# Patient Record
Sex: FEMALE | Race: WHITE | Employment: OTHER | URBAN - METROPOLITAN AREA
[De-identification: names, ages, dates, MRNs, and addresses within clinical notes are randomized per-mention and may not be internally consistent; named-entity substitution may affect disease eponyms.]

---

## 2017-02-13 ENCOUNTER — TELEPHONE (OUTPATIENT)
Dept: INTERNAL MEDICINE CLINIC | Age: 72
End: 2017-02-13

## 2017-02-13 NOTE — TELEPHONE ENCOUNTER
Patient would like to know what dermatologist Fabiola Wilkins would prefer that she see. Please call patient back at [phone]

## 2017-02-14 NOTE — TELEPHONE ENCOUNTER
Contacted patient for further detail  She states that has already made an appointment with [facility] dermatology.  I advised her to call the office back if there is anything further we can help with

## 2017-03-14 ENCOUNTER — OFFICE VISIT (OUTPATIENT)
Dept: INTERNAL MEDICINE CLINIC | Age: 72
End: 2017-03-14

## 2017-03-14 VITALS
HEIGHT: 66 IN | BODY MASS INDEX: 36.8 KG/M2 | TEMPERATURE: 96.9 F | DIASTOLIC BLOOD PRESSURE: 80 MMHG | SYSTOLIC BLOOD PRESSURE: 130 MMHG | HEART RATE: 76 BPM | OXYGEN SATURATION: 98 % | WEIGHT: 229 LBS | RESPIRATION RATE: 18 BRPM

## 2017-03-14 DIAGNOSIS — R09.89 CHEST WALL SYMPTOM OR COMPLAINT: Primary | ICD-10-CM

## 2017-03-14 DIAGNOSIS — I10 BENIGN ESSENTIAL HYPERTENSION: ICD-10-CM

## 2017-03-14 DIAGNOSIS — Z11.59 NEED FOR HEPATITIS C SCREENING TEST: ICD-10-CM

## 2017-03-14 DIAGNOSIS — R73.01 IFG (IMPAIRED FASTING GLUCOSE): ICD-10-CM

## 2017-03-14 DIAGNOSIS — E78.00 HYPERCHOLESTEREMIA: ICD-10-CM

## 2017-03-14 LAB — HBA1C MFR BLD HPLC: 5.6 %

## 2017-03-14 NOTE — PROGRESS NOTES
HPI:  Kiya Guerrier is a 73y.o. year old female who returns to clinic today for routine follow up appointment to discuss the issues below:    Since last visit May 2016 -   She has had a meniscal tear repair with Dr. Raghav Hyde. She completed 8 weeks PT. She continues to have bl knee discomfort. Concerns- notes \"I feel like my heart is going to jump out of my chest\" - occurs only in the evenings when she gets into bed in a recumbent position and mornings upon waking, doesn't wake her from sleep. 2016 was a stressful year for her. Climbing stairs gives her shortness of breath, nothing new. No chest pressure. Admits to being out of shape. Weight stable from last year. Doesn't exercise due to the knee pain. She had a normal stress echo in 2012. fam hx - father had multiple strokes in his 45s, mother no heart disease, her two sisters have no heart disease. H/o IFG - poc A1c 5.6 % today. Htn. Adherent to losartan. Takes at night. Hld. Adherent to atorvastatin. Takes at night. Prior to Admission medications    Medication Sig Start Date End Date Taking? Authorizing Provider   losartan (COZAAR) 100 mg tablet TAKE 1 TABLET BY MOUTH DAILY 7/1/16  Yes [address], [place]   atorvastatin (LIPITOR) 20 mg tablet TAKE 1 TABLET BY MOUTH DAILY 7/1/16  Yes [address], [place]   multivitamin (ONE A DAY) tablet Take 1 Tab by mouth daily. Yes Historical Provider          Allergies   Allergen Reactions    Ace Inhibitors Cough    Thiazides Photosensitivity           Review of Systems   Constitutional: Negative for chills, fever and malaise/fatigue. HENT: Negative for congestion. Respiratory: Negative for cough, shortness of breath and wheezing. Cardiovascular: Negative for leg swelling. Gastrointestinal: Negative for abdominal pain and blood in stool. Musculoskeletal: Negative for falls, joint pain and myalgias. Neurological: Negative for dizziness and headaches.          Physical Exam Constitutional: She appears well-nourished. Obese   Neck: Carotid bruit is not present. Cardiovascular: Normal rate, regular rhythm and normal heart sounds. No murmur heard. Pulses:       Carotid pulses are 2+ on the right side, and 2+ on the left side. Pulmonary/Chest: Effort normal and breath sounds normal.   Abdominal: Soft. Bowel sounds are normal. There is no hepatosplenomegaly. There is no tenderness. Musculoskeletal: She exhibits no edema. Visit Vitals    /80 (BP 1 Location: Left arm, BP Patient Position: Sitting)    Pulse 76    Temp 96.9 °F (36.1 °C) (Oral)    Resp 18    Ht 5' 6\" (1.676 m)    Wt 229 lb (103.9 kg)    SpO2 98%    BMI 36.96 kg/m2     ECG:  NSR with no significant changes compared to 5/2016 study    Assessment & Plan:  Gabriel Chaudhry was seen today for palpitations and other. Diagnoses and all orders for this visit:    Chest wall symptom or complaint  I suspect she has reflux at night. No new exertional symptoms. Discussed trigger foods for heartburn and behavior modification including staying upright for 3 hours after dinner. I will have her take an H2 blocker for 2 weeks (Zantac or Pepcid) twice a day and then go off it for 1-2 weeks. Come back in in a month for reassessment. If symptoms improve but do not resolve, consider further testing. If no improvement at all, consider further cardiac workup such as a Holter monitor for 24 hrs. I will check her labs this week. -     AMB POC EKG ROUTINE W/ 12 LEADS, INTER & REP  -     METABOLIC PANEL, COMPREHENSIVE  -     CBC W/O DIFF  -     TSH REFLEX TO T4    IFG (impaired fasting glucose)   -     AMB POC HEMOGLOBIN A1C    Benign essential hypertension  I evaluated and recommended to continue current doses of medications pending lab work. -     METABOLIC PANEL, COMPREHENSIVE    Hypercholesteremia  I evaluated and recommended to continue current doses of medications pending lab work.    -     LIPID PANEL  - METABOLIC PANEL, COMPREHENSIVE    Need for hepatitis C screening test  -     HEPATITIS C AB         Follow-up Disposition:  Return in about 1 month (around 4/14/2017) for f/u for chest pain (suspect heartburn). Advised her to call back or return to office if symptoms worsen/change/persist.  Discussed expected course/resolution/complications of diagnosis in detail with patient. Medication risks/benefits/costs/interactions/alternatives discussed with patient. She was given an after visit summary which includes diagnoses, current medications, & vitals. She expressed understanding with the diagnosis and plan.

## 2017-03-14 NOTE — PROGRESS NOTES
Chief Complaint   Patient presents with    Palpitations     happens at night and in the morning    Other     clicking in her ear     1. Have you been to the ER, urgent care clinic since your last visit? Hospitalized since your last visit? No    2. Have you seen or consulted any other health care providers outside of the [address] [place] [address] since your last visit? Include any pap smears or colon screening.  No

## 2017-03-14 NOTE — MR AVS SNAPSHOT
Visit Information Date & Time Provider Department Dept. Phone Encounter #  
 3/14/2017 11:20 AM [phone] [id number] Follow-up Instructions Return in about 1 month (around 4/14/2017) for f/u for chest pain (suspect heartburn). Upcoming Health Maintenance Date Due Hepatitis C Screening 1945 DTaP/Tdap/Td series (1 - Tdap) 11/6/1968 ZOSTER VACCINE AGE 60> 11/6/2007 Pneumococcal 65+ Low/Medium Risk (2 of 2 - PCV13) 10/10/2014 INFLUENZA AGE 9 TO ADULT 8/1/2016 GLAUCOMA SCREENING Q2Y 4/30/2017 MEDICARE YEARLY EXAM 6/8/2017 BREAST CANCER SCRN MAMMOGRAM 6/7/2018 COLONOSCOPY 10/10/2023 Allergies as of 3/14/2017  Review Complete On: 3/14/2017 By: [address], [place]  
  
 Severity Noted Reaction Type Reactions Ace Inhibitors  09/20/2011   Side Effect Cough Thiazides  09/21/2011   Topical Photosensitivity Current Immunizations  Reviewed on 10/28/2015 Name Date Influenza High Dose Vaccine PF 10/28/2015 Influenza Vaccine 12/30/2014, 9/1/2013 Pneumococcal Polysaccharide (PPSV-23) 10/10/2013 11:42 AM  
  
 Not reviewed this visit You Were Diagnosed With   
  
 Codes Comments Palpitations    -  Primary ICD-10-CM: R00.2 ICD-9-CM: 785.1 IFG (impaired fasting glucose)     ICD-10-CM: R73.01 
ICD-9-CM: 790.21 Benign essential hypertension     ICD-10-CM: I10 
ICD-9-CM: 401.1 Hypercholesteremia     ICD-10-CM: E78.00 ICD-9-CM: 272.0 Need for hepatitis C screening test     ICD-10-CM: Z11.59 
ICD-9-CM: V73.89 Vitals BP Pulse Temp Resp Height(growth percentile) Weight(growth percentile) 130/80 (BP 1 Location: Left arm, BP Patient Position: Sitting) 76 96.9 °F (36.1 °C) (Oral) 18 5' 6\" (1.676 m) 229 lb (103.9 kg) SpO2 BMI OB Status Smoking Status 98% 36.96 kg/m2 Postmenopausal Never Smoker BMI and BSA Data Body Mass Index Body Surface Area  
 36.96 kg/m 2 2.2 m 2 Preferred Pharmacy [facility] Phone [facility] [address] [place] [address], [address] [place] [address] [phone] Your Updated Medication List  
  
   
This list is accurate as of: 3/14/17 12:21 PM.  Always use your most recent med list.  
  
  
  
  
 atorvastatin 20 mg tablet Commonly known as:  LIPITOR  
TAKE 1 TABLET BY MOUTH DAILY losartan 100 mg tablet Commonly known as:  COZAAR  
TAKE 1 TABLET BY MOUTH DAILY  
  
 multivitamin tablet Commonly known as:  ONE A DAY Take 1 Tab by mouth daily. We Performed the Following AMB POC EKG ROUTINE W/ 12 LEADS, INTER & REP [90706 CPT(R)] AMB POC HEMOGLOBIN A1C [49157 CPT(R)] CBC W/O DIFF [81979 CPT(R)] HEPATITIS C AB [93304 CPT(R)] LIPID PANEL [02375 CPT(R)] METABOLIC PANEL, COMPREHENSIVE [01017 CPT(R)] TSH REFLEX TO T4 [[id number] Custom] Follow-up Instructions Return in about 1 month (around 4/14/2017) for f/u for chest pain (suspect heartburn). Patient Instructions Plan: 
 
Take either Pepcid or Zantac (generic is ok) twice daily for the next 2 weeks. Take your dose 30-60 minutes before breakfast and dinner. Stop for one week before your 1 month follow up visit. Gastroesophageal Reflux Disease (GERD): Care Instructions Your Care Instructions Gastroesophageal reflux disease (GERD) is the backward flow of stomach acid into the esophagus. The esophagus is the tube that leads from your throat to your stomach. A one-way valve prevents the stomach acid from moving up into this tube. When you have GERD, this valve does not close tightly enough. If you have mild GERD symptoms including heartburn, you may be able to control the problem with antacids or over-the-counter medicine.  Changing your diet, losing weight, and making other lifestyle changes can also help reduce symptoms. Follow-up care is a key part of your treatment and safety. Be sure to make and go to all appointments, and call your doctor if you are having problems. Its also a good idea to know your test results and keep a list of the medicines you take. How can you care for yourself at home? · Take your medicines exactly as prescribed. Call your doctor if you think you are having a problem with your medicine. · Your doctor may recommend over-the-counter medicine. For mild or occasional indigestion, antacids, such as Tums, Gaviscon, Mylanta, or Maalox, may help. Your doctor also may recommend over-the-counter acid reducers, such as Pepcid AC, Tagamet HB, Zantac 75, or Prilosec. Read and follow all instructions on the label. If you use these medicines often, talk with your doctor. · Change your eating habits. ¨ Its best to eat several small meals instead of two or three large meals. ¨ After you eat, wait 2 to 3 hours before you lie down. ¨ Chocolate, mint, and alcohol can make GERD worse. ¨ Spicy foods, foods that have a lot of acid (like tomatoes and oranges), and coffee can make GERD symptoms worse in some people. If your symptoms are worse after you eat a certain food, you may want to stop eating that food to see if your symptoms get better. · Do not smoke or chew tobacco. Smoking can make GERD worse. If you need help quitting, talk to your doctor about stop-smoking programs and medicines. These can increase your chances of quitting for good. · If you have GERD symptoms at night, raise the head of your bed 6 to 8 inches by putting the frame on blocks or placing a foam wedge under the head of your mattress. (Adding extra pillows does not work.) · Do not wear tight clothing around your middle. · Lose weight if you need to. Losing just 5 to 10 pounds can help. When should you call for help? Call your doctor now or seek immediate medical care if: 
· You have new or different belly pain. · Your stools are black and tarlike or have streaks of blood. Watch closely for changes in your health, and be sure to contact your doctor if: 
· Your symptoms have not improved after 2 days. · Food seems to catch in your throat or chest. 
Where can you learn more? Go to http://yenifer-juany.info/. Enter R085 in the search box to learn more about \"Gastroesophageal Reflux Disease (GERD): Care Instructions. \" Current as of: August 9, 2016 Content Version: 11.1 © 6686-6402 [employer]. Care instructions adapted under license by [employer] (which disclaims liability or warranty for this information). If you have questions about a medical condition or this instruction, always ask your healthcare professional. [address] any warranty or liability for your use of this information. Introducing [facility]! Shoshana Rodriguez introduces [employer] patient portal. Now you can access parts of your medical record, email your doctor's office, and request medication refills online. 1. In your internet browser, go to [email or web address] 2. Click on the First Time User? Click Here link in the Sign In box. You will see the New Member Sign Up page. 3. Enter your [employer] Access Code exactly as it appears below. You will not need to use this code after youve completed the sign-up process. If you do not sign up before the expiration date, you must request a new code. · [employer] Access Code: [email or web address] Expires: 6/12/2017 12:19 PM 
 
4. Enter the last four digits of your Social Security Number (xxxx) and Date of Birth (mm/dd/yyyy) as indicated and click Submit. You will be taken to the next sign-up page. 5. Create a [employer] ID. This will be your [employer] login ID and cannot be changed, so think of one that is secure and easy to remember. 6. Create a [employer] password. You can change your password at any time. 7. Enter your Password Reset Question and Answer. This can be used at a later time if you forget your password. 8. Enter your e-mail address. You will receive e-mail notification when new information is available in [address] 9. Click Sign Up. You can now view and download portions of your medical record. 10. Click the Download Summary menu link to download a portable copy of your medical information. If you have questions, please visit the Frequently Asked Questions section of the [employer] website. Remember, [employer] is NOT to be used for urgent needs. For medical emergencies, dial 911. Now available from your iPhone and Android! Please provide this summary of care documentation to your next provider. Your primary care clinician is listed as [address]. If you have any questions after today's visit, please call [phone].

## 2017-03-14 NOTE — PATIENT INSTRUCTIONS
Plan:    Take either Pepcid or Zantac (generic is ok) twice daily for the next 2 weeks. Take your dose 30-60 minutes before breakfast and dinner. Stop for one week before your 1 month follow up visit. Gastroesophageal Reflux Disease (GERD): Care Instructions  Your Care Instructions    Gastroesophageal reflux disease (GERD) is the backward flow of stomach acid into the esophagus. The esophagus is the tube that leads from your throat to your stomach. A one-way valve prevents the stomach acid from moving up into this tube. When you have GERD, this valve does not close tightly enough. If you have mild GERD symptoms including heartburn, you may be able to control the problem with antacids or over-the-counter medicine. Changing your diet, losing weight, and making other lifestyle changes can also help reduce symptoms. Follow-up care is a key part of your treatment and safety. Be sure to make and go to all appointments, and call your doctor if you are having problems. Its also a good idea to know your test results and keep a list of the medicines you take. How can you care for yourself at home? · Take your medicines exactly as prescribed. Call your doctor if you think you are having a problem with your medicine. · Your doctor may recommend over-the-counter medicine. For mild or occasional indigestion, antacids, such as Tums, Gaviscon, Mylanta, or Maalox, may help. Your doctor also may recommend over-the-counter acid reducers, such as Pepcid AC, Tagamet HB, Zantac 75, or Prilosec. Read and follow all instructions on the label. If you use these medicines often, talk with your doctor. · Change your eating habits. ¨ Its best to eat several small meals instead of two or three large meals. ¨ After you eat, wait 2 to 3 hours before you lie down. ¨ Chocolate, mint, and alcohol can make GERD worse.   ¨ Spicy foods, foods that have a lot of acid (like tomatoes and oranges), and coffee can make GERD symptoms worse in some people. If your symptoms are worse after you eat a certain food, you may want to stop eating that food to see if your symptoms get better. · Do not smoke or chew tobacco. Smoking can make GERD worse. If you need help quitting, talk to your doctor about stop-smoking programs and medicines. These can increase your chances of quitting for good. · If you have GERD symptoms at night, raise the head of your bed 6 to 8 inches by putting the frame on blocks or placing a foam wedge under the head of your mattress. (Adding extra pillows does not work.)  · Do not wear tight clothing around your middle. · Lose weight if you need to. Losing just 5 to 10 pounds can help. When should you call for help? Call your doctor now or seek immediate medical care if:  · You have new or different belly pain. · Your stools are black and tarlike or have streaks of blood. Watch closely for changes in your health, and be sure to contact your doctor if:  · Your symptoms have not improved after 2 days. · Food seems to catch in your throat or chest.  Where can you learn more? Go to http://yenifer-juany.info/. Enter H893 in the search box to learn more about \"Gastroesophageal Reflux Disease (GERD): Care Instructions. \"  Current as of: August 9, 2016  Content Version: 11.1  © 3639-3744 Healthwise, [employer]. Care instructions adapted under license by [employer] (which disclaims liability or warranty for this information). If you have questions about a medical condition or this instruction, always ask your healthcare professional. [place] [address] any warranty or liability for your use of this information.

## 2017-03-24 ENCOUNTER — HOSPITAL ENCOUNTER (OUTPATIENT)
Dept: LAB | Age: 72
Discharge: HOME | End: 2017-03-24
Payer: MEDICARE

## 2017-03-24 PROCEDURE — 84443 ASSAY THYROID STIM HORMONE: CPT

## 2017-03-24 PROCEDURE — 85027 COMPLETE CBC AUTOMATED: CPT

## 2017-03-24 PROCEDURE — 86803 HEPATITIS C AB TEST: CPT

## 2017-03-24 PROCEDURE — 80053 COMPREHEN METABOLIC PANEL: CPT

## 2017-03-24 PROCEDURE — 36415 COLL VENOUS BLD VENIPUNCTURE: CPT

## 2017-03-24 PROCEDURE — 80061 LIPID PANEL: CPT

## 2017-03-25 LAB
ALBUMIN SERPL-MCNC: 4.2 G/DL (ref 3.6–4.8)
ALBUMIN/GLOB SERPL: 1.6 {RATIO} (ref 1.2–2.2)
ALP SERPL-CCNC: 82 IU/L (ref 39–117)
ALT SERPL-CCNC: 23 IU/L (ref 0–32)
AST SERPL-CCNC: 18 IU/L (ref 0–40)
BILIRUB SERPL-MCNC: 0.5 MG/DL (ref 0–1.2)
BUN SERPL-MCNC: 16 MG/DL (ref 8–27)
BUN/CREAT SERPL: 26 (ref 11–26)
CALCIUM SERPL-MCNC: 9.3 MG/DL (ref 8.7–10.3)
CHLORIDE SERPL-SCNC: 103 MMOL/L (ref 96–106)
CHOLEST SERPL-MCNC: 206 MG/DL (ref 100–199)
CO2 SERPL-SCNC: 25 MMOL/L (ref 18–29)
CREAT SERPL-MCNC: 0.62 MG/DL (ref 0.57–1)
ERYTHROCYTE [DISTWIDTH] IN BLOOD BY AUTOMATED COUNT: 13.7 % (ref 12.3–15.4)
GLOBULIN SER CALC-MCNC: 2.6 G/DL (ref 1.5–4.5)
GLUCOSE SERPL-MCNC: 110 MG/DL (ref 65–99)
HCT VFR BLD AUTO: 42.5 % (ref 34–46.6)
HCV AB S/CO SERPL IA: <0.1 S/CO RATIO (ref 0–0.9)
HDLC SERPL-MCNC: 51 MG/DL
HGB BLD-MCNC: 14.5 G/DL (ref 11.1–15.9)
INTERPRETATION, 910389: NORMAL
LDLC SERPL CALC-MCNC: 129 MG/DL (ref 0–99)
MCH RBC QN AUTO: 30.1 PG (ref 26.6–33)
MCHC RBC AUTO-ENTMCNC: 34.1 G/DL (ref 31.5–35.7)
MCV RBC AUTO: 88 FL (ref 79–97)
PLATELET # BLD AUTO: 254 X10E3/UL (ref 150–379)
POTASSIUM SERPL-SCNC: 4.7 MMOL/L (ref 3.5–5.2)
PROT SERPL-MCNC: 6.8 G/DL (ref 6–8.5)
RBC # BLD AUTO: 4.82 X10E6/UL (ref 3.77–5.28)
SODIUM SERPL-SCNC: 144 MMOL/L (ref 134–144)
TRIGL SERPL-MCNC: 130 MG/DL (ref 0–149)
TSH SERPL DL<=0.005 MIU/L-ACNC: 3.23 UIU/ML (ref 0.45–4.5)
VLDLC SERPL CALC-MCNC: 26 MG/DL (ref 5–40)
WBC # BLD AUTO: 6.4 X10E3/UL (ref 3.4–10.8)

## 2017-03-30 RX ORDER — ATORVASTATIN CALCIUM 40 MG/1
40 TABLET, FILM COATED ORAL DAILY
Qty: 90 TAB | Refills: 3 | Status: SHIPPED | OUTPATIENT
Start: 2017-03-30 | End: 2018-04-19 | Stop reason: SDUPTHER

## 2017-03-31 NOTE — PROGRESS NOTES
The following message was sent to patient by letter along with lab results: All of your labs look good aside from a continued elevation of your LDL (or \"bad\" type) cholesterol level. I recommend an increase in your atorvastatin dose to 40 mg daily. I have updated your prescription at your pharmacy.

## 2017-04-18 ENCOUNTER — OFFICE VISIT (OUTPATIENT)
Dept: INTERNAL MEDICINE CLINIC | Age: 72
End: 2017-04-18

## 2017-04-18 VITALS
BODY MASS INDEX: 37.61 KG/M2 | DIASTOLIC BLOOD PRESSURE: 78 MMHG | WEIGHT: 234 LBS | RESPIRATION RATE: 18 BRPM | TEMPERATURE: 97.7 F | OXYGEN SATURATION: 98 % | SYSTOLIC BLOOD PRESSURE: 142 MMHG | HEART RATE: 78 BPM | HEIGHT: 66 IN

## 2017-04-18 DIAGNOSIS — R00.2 PALPITATIONS: Primary | ICD-10-CM

## 2017-04-18 DIAGNOSIS — S80.12XA TRAUMATIC HEMATOMA OF LOWER LEG, LEFT, INITIAL ENCOUNTER: ICD-10-CM

## 2017-04-18 DIAGNOSIS — F41.1 GAD (GENERALIZED ANXIETY DISORDER): ICD-10-CM

## 2017-04-18 DIAGNOSIS — I10 BENIGN ESSENTIAL HYPERTENSION: ICD-10-CM

## 2017-04-18 DIAGNOSIS — E78.00 HYPERCHOLESTEREMIA: ICD-10-CM

## 2017-04-18 NOTE — PROGRESS NOTES
74 yo female in for f/u of palpitations/heart fluttering. She took the Antacid as instructed by Dr. Barbie Salamanca, and during that time had several brief episodes of what seems to be her GERD, but these episodes are unpredictable anyway. She has a lot of family stressors right now, one of which is her daughter's up-coming wedding. Her sleep is not good, but this is not new. Also she fell 6 days ago, and continues to have swelling in her foot and leg. She stubbed her toe and fell forward onto brick steps striking her distal LLE. Since then, there has been a knot at that area, and the bruising is now down into the ankle, foot and toes. There has been some discomfort on bottom of the foot at base of 2nd toe also. She did not apply ice. She initially took some Advil, but not after the first day. She has not really been able to be off it or prop it up for long periods. She likes salt, but is cognizant of being careful with it. She is not good about water drinking. She asks about OTC appetite suppressants.     PE: Overweight WF very anxious-appearing and doesn't make a lot of eye contact during our encounter   Repeat BP - 140/78   Heart - 60/M and regular   LLE - laterally and distally w/ lemon-sized hematoma; bruising is visible in medical ankle and top of foot to the toes    Imp: LISANDRA/situational stress   HLD - recent increase in statin   HTN - systolic higher than usual (more take out food recently)   Traumatic Hematoma LLE   Overweight    Plan: Order for fasting lipid panel to be done in early June   She is asked to monitor BP at a pharmacy or grocery    Advised to check her heart rate when feeling the fluttering   May need 24 hr Holter monitor   Advised against OTC appetite suppresants; suggested she eat enough protein w/ each meal, and choose low connie snacks and drink more water   Schedule for 4 mo f/u with the new NP   _______________________  Expected course of current diagnosed problem(s) as well as expected progression and possible complications, and desired follow up with provider are discussed with patient. Patient is encouraged to be back in touch with any questions or concerns. Patient expresses understanding of plan of care. Patient is given AVS which includes diagnoses, current medications, vitals.

## 2017-04-18 NOTE — PROGRESS NOTES
Chief Complaint   Patient presents with    Palpitations     f/u saw Dr. Codey Villagran 1 month ago, patient stated maybe better she is less stressed out now.     Leg Pain     fell last Wednesday, swollen     Foot Pain     fell last Wednesday, swollen

## 2017-04-18 NOTE — PATIENT INSTRUCTIONS
Careful with salt/sodium    Drink more water    Elevate legs when sitting    Warm compress to bruised area on Left Leg for 20 minutes per application to help speed the resolution of hematoma/bruising    Report repeated episodes of heart fluttering for possible 24 hour monitor

## 2017-06-05 DIAGNOSIS — E78.00 HYPERCHOLESTEREMIA: ICD-10-CM

## 2017-06-29 ENCOUNTER — HOSPITAL ENCOUNTER (OUTPATIENT)
Dept: LAB | Age: 72
Discharge: HOME | End: 2017-06-29
Payer: MEDICARE

## 2017-06-29 PROCEDURE — 36415 COLL VENOUS BLD VENIPUNCTURE: CPT

## 2017-06-29 PROCEDURE — 80061 LIPID PANEL: CPT

## 2017-06-30 LAB
CHOLEST SERPL-MCNC: 181 MG/DL (ref 100–199)
HDLC SERPL-MCNC: 50 MG/DL
INTERPRETATION, 910389: NORMAL
LDLC SERPL CALC-MCNC: 96 MG/DL (ref 0–99)
TRIGL SERPL-MCNC: 177 MG/DL (ref 0–149)
VLDLC SERPL CALC-MCNC: 35 MG/DL (ref 5–40)

## 2017-09-28 ENCOUNTER — OFFICE VISIT (OUTPATIENT)
Dept: INTERNAL MEDICINE CLINIC | Age: 72
End: 2017-09-28

## 2017-09-28 VITALS
HEART RATE: 70 BPM | DIASTOLIC BLOOD PRESSURE: 82 MMHG | RESPIRATION RATE: 14 BRPM | SYSTOLIC BLOOD PRESSURE: 166 MMHG | HEIGHT: 66 IN | BODY MASS INDEX: 36.96 KG/M2 | OXYGEN SATURATION: 96 % | TEMPERATURE: 98.9 F | WEIGHT: 230 LBS

## 2017-09-28 DIAGNOSIS — I10 BENIGN ESSENTIAL HYPERTENSION: ICD-10-CM

## 2017-09-28 DIAGNOSIS — R73.01 IFG (IMPAIRED FASTING GLUCOSE): ICD-10-CM

## 2017-09-28 DIAGNOSIS — F32.A MILD DEPRESSION: ICD-10-CM

## 2017-09-28 DIAGNOSIS — Z23 ENCOUNTER FOR IMMUNIZATION: Primary | ICD-10-CM

## 2017-09-28 DIAGNOSIS — Z92.89 H/O BONE DENSITY STUDY: ICD-10-CM

## 2017-09-28 DIAGNOSIS — Z00.00 MEDICARE ANNUAL WELLNESS VISIT, SUBSEQUENT: ICD-10-CM

## 2017-09-28 DIAGNOSIS — M85.9 DISORDER OF BONE DENSITY AND STRUCTURE, UNSPECIFIED: ICD-10-CM

## 2017-09-28 DIAGNOSIS — E78.00 HYPERCHOLESTEREMIA: ICD-10-CM

## 2017-09-28 DIAGNOSIS — Z12.31 ENCOUNTER FOR SCREENING MAMMOGRAM FOR MALIGNANT NEOPLASM OF BREAST: ICD-10-CM

## 2017-09-28 RX ORDER — AMLODIPINE BESYLATE 5 MG/1
5 TABLET ORAL DAILY
Qty: 30 TAB | Refills: 3 | Status: SHIPPED | OUTPATIENT
Start: 2017-09-28 | End: 2018-01-15 | Stop reason: SDUPTHER

## 2017-09-28 NOTE — PATIENT INSTRUCTIONS
1.  Start taking amlodipine 5mg, 1 tab daily for your high blood pressure    2. Schedule your mammogram, colonoscopy, DEXA scan, and Echo Stress test    3. Have labs done at lab whit- you will need to be fasting    4. Return in 6 weeks for BP recheck      Vaccine Information Statement    Influenza (Flu) Vaccine (Inactivated or Recombinant): What you need to know    Many Vaccine Information Statements are available in [place]ian and other languages. See www.immunize.org/vis  Hojas de Información Sobre Vacunas están disponibles en Español y en muchos otros idiomas. Visite www.immunize.org/vis    1. Why get vaccinated? Influenza (flu) is a contagious disease that spreads around the [place] every year, usually between October and May. Flu is caused by influenza viruses, and is spread mainly by coughing, sneezing, and close contact. Anyone can get flu. Flu strikes suddenly and can last several days. Symptoms vary by age, but can include:   fever/chills   sore throat   muscle aches   fatigue   cough   headache    runny or stuffy nose    Flu can also lead to pneumonia and blood infections, and cause diarrhea and seizures in children. If you have a medical condition, such as heart or lung disease, flu can make it worse. Flu is more dangerous for some people. Infants and young children, people 72years of age and older, pregnant women, and people with certain health conditions or a weakened immune system are at greatest risk. Each year thousands of people in the [facility] die from flu, and many more are hospitalized. Flu vaccine can:   keep you from getting flu,   make flu less severe if you do get it, and   keep you from spreading flu to your family and other people. 2. Inactivated and recombinant flu vaccines    A dose of flu vaccine is recommended every flu season. Children 6 months through 6years of age may need two doses during the same flu season.   Everyone else needs only one dose each flu season. Some inactivated flu vaccines contain a very small amount of a mercury-based preservative called thimerosal. Studies have not shown thimerosal in vaccines to be harmful, but flu vaccines that do not contain thimerosal are available. There is no live flu virus in flu shots. They cannot cause the flu. There are many flu viruses, and they are always changing. Each year a new flu vaccine is made to protect against three or four viruses that are likely to cause disease in the upcoming flu season. But even when the vaccine doesnt exactly match these viruses, it may still provide some protection    Flu vaccine cannot prevent:   flu that is caused by a virus not covered by the vaccine, or   illnesses that look like flu but are not. It takes about 2 weeks for protection to develop after vaccination, and protection lasts through the flu season. 3. Some people should not get this vaccine    Tell the person who is giving you the vaccine:     If you have any severe, life-threatening allergies. If you ever had a life-threatening allergic reaction after a dose of flu vaccine, or have a severe allergy to any part of this vaccine, you may be advised not to get vaccinated. Most, but not all, types of flu vaccine contain a small amount of egg protein.  If you ever had Guillain-Barré Syndrome (also called GBS). Some people with a history of GBS should not get this vaccine. This should be discussed with your doctor.  If you are not feeling well. It is usually okay to get flu vaccine when you have a mild illness, but you might be asked to come back when you feel better. 4. Risks of a vaccine reaction    With any medicine, including vaccines, there is a chance of reactions. These are usually mild and go away on their own, but serious reactions are also possible. Most people who get a flu shot do not have any problems with it.      Minor problems following a flu shot include:    soreness, redness, or swelling where the shot was given     hoarseness   sore, red or itchy eyes   cough   fever   aches   headache   itching   fatigue  If these problems occur, they usually begin soon after the shot and last 1 or 2 days. More serious problems following a flu shot can include the following:     There may be a small increased risk of Guillain-Barré Syndrome (GBS) after inactivated flu vaccine. This risk has been estimated at 1 or 2 additional cases per million people vaccinated. This is much lower than the risk of severe complications from flu, which can be prevented by flu vaccine.  Young children who get the flu shot along with pneumococcal vaccine (PCV13) and/or DTaP vaccine at the same time might be slightly more likely to have a seizure caused by fever. Ask your doctor for more information. Tell your doctor if a child who is getting flu vaccine has ever had a seizure. Problems that could happen after any injected vaccine:      People sometimes faint after a medical procedure, including vaccination. Sitting or lying down for about 15 minutes can help prevent fainting, and injuries caused by a fall. Tell your doctor if you feel dizzy, or have vision changes or ringing in the ears.  Some people get severe pain in the shoulder and have difficulty moving the arm where a shot was given. This happens very rarely.  Any medication can cause a severe allergic reaction. Such reactions from a vaccine are very rare, estimated at about 1 in a million doses, and would happen within a few minutes to a few hours after the vaccination. As with any medicine, there is a very remote chance of a vaccine causing a serious injury or death. The safety of vaccines is always being monitored. For more information, visit: www.cdc.gov/vaccinesafety/    5. What if there is a serious reaction? What should I look for?      Look for anything that concerns you, such as signs of a severe allergic reaction, very high fever, or unusual behavior. Signs of a severe allergic reaction can include hives, swelling of the face and throat, difficulty breathing, a fast heartbeat, dizziness, and weakness  usually within a few minutes to a few hours after the vaccination. What should I do?  If you think it is a severe allergic reaction or other emergency that cant wait, call 9-1-1 and get the person to the nearest hospital. Otherwise, call your doctor.  Reactions should be reported to the Vaccine Adverse Event Reporting System (VAERS). Your doctor should file this report, or you can do it yourself through  the VAERS web site at www.vaers. [facility].gov, or by calling 2-618.617.7201. VAERS does not give medical advice. 6. The National Vaccine Injury Compensation Program    The [facility] Vaccine Injury Compensation Program (VICP) is a federal program that was created to compensate people who may have been injured by certain vaccines. Persons who believe they may have been injured by a vaccine can learn about the program and about filing a claim by calling 5-644.646.5189 or visiting the [employer] website at www.[facility].gov/vaccinecompensation. There is a time limit to file a claim for compensation. 7. How can I learn more?  Ask your healthcare provider. He or she can give you the vaccine package insert or suggest other sources of information.  Call your local or state health department.  Contact the Centers for Disease Control and Prevention (CDC):  - Call 6-765.755.5360 (1-800-CDC-INFO) or  - Visit CDCs website at www.cdc.gov/flu    Vaccine Information Statement   Inactivated Influenza Vaccine   8/7/2015  42 . [place] [facility] 284GM-52    Department of Health and Human Services  Centers for Disease Control and Prevention    Office Use Only

## 2017-09-28 NOTE — MR AVS SNAPSHOT
Visit Information Date & Time Provider Department Dept. Phone Encounter #  
 9/28/2017  1:00 PM JOSIAN[facility] [phone] [id number] Follow-up Instructions Return in about 6 weeks (around 11/9/2017) for CPE, [facility] Wellness. Upcoming Health Maintenance Date Due DTaP/Tdap/Td series (1 - Tdap) 11/6/1968 ZOSTER VACCINE AGE 60> 9/6/2007 Pneumococcal 65+ Low/Medium Risk (2 of 2 - PCV13) 10/10/2014 GLAUCOMA SCREENING Q2Y 4/30/2017 INFLUENZA AGE 9 TO ADULT 8/1/2017 BREAST CANCER SCRN MAMMOGRAM 6/7/2018 MEDICARE YEARLY EXAM 9/29/2018 COLONOSCOPY 10/10/2023 Allergies as of 9/28/2017  Review Complete On: 9/28/2017 By: Milan Obando LPN Severity Noted Reaction Type Reactions Ace Inhibitors  09/20/2011   Side Effect Cough Thiazides  09/21/2011   Topical Photosensitivity Current Immunizations  Reviewed on 10/28/2015 Name Date Influenza High Dose Vaccine PF  Incomplete, 10/28/2015 Influenza Vaccine 12/30/2014, 9/1/2013 Pneumococcal Polysaccharide (PPSV-23) 10/10/2013 11:42 AM  
  
 Not reviewed this visit You Were Diagnosed With   
  
 Codes Comments Encounter for immunization    -  Primary ICD-10-CM: S06 ICD-9-CM: V03.89 Medicare annual wellness visit, subsequent     ICD-10-CM: Z00.00 ICD-9-CM: V70.0 Encounter for screening mammogram for malignant neoplasm of breast     ICD-10-CM: Z12.31 
ICD-9-CM: V76.12 Disorder of bone density and structure, unspecified     ICD-10-CM: M85.9 ICD-9-CM: 733.90 Benign essential hypertension     ICD-10-CM: I10 
ICD-9-CM: 401.1 Hypercholesteremia     ICD-10-CM: E78.00 ICD-9-CM: 272.0 Prediabetes     ICD-10-CM: R73.03 
ICD-9-CM: 790.29   
 H/O bone density study     ICD-10-CM: Z92.89 ICD-9-CM: V15.89 IFG (impaired fasting glucose)     ICD-10-CM: R73.01 
ICD-9-CM: 790.21 Vitals BP Pulse Temp Resp Height(growth percentile) Weight(growth percentile) 164/82 (BP 1 Location: Left arm, BP Patient Position: Sitting) 70 98.9 °F (37.2 °C) (Oral) 14 5' 6\" (1.676 m) 230 lb (104.3 kg) SpO2 BMI OB Status Smoking Status 96% 37.12 kg/m2 Postmenopausal Never Smoker BMI and BSA Data Body Mass Index Body Surface Area  
 37.12 kg/m 2 2.2 m 2 Preferred Pharmacy Pharmacy Name Phone [facility] [address] [place] [address], [address] [place] [address] [phone] Your Updated Medication List  
  
   
This list is accurate as of: 17  2:09 PM.  Always use your most recent med list. amLODIPine 5 mg tablet Commonly known as:  [place] Cordial Take 1 Tab by mouth daily. Indications: hypertension  
  
 atorvastatin 40 mg tablet Commonly known as:  LIPITOR Take 1 Tab by mouth daily. losartan 100 mg tablet Commonly known as:  COZAAR  
TAKE 1 TABLET BY MOUTH DAILY  
  
 multivitamin tablet Commonly known as:  ONE A DAY Take 1 Tab by mouth daily. pneumococcal 23-valent 25 mcg/0.5 mL injection Commonly known as:  PNEUMOVAX 23  
0.5 mL by IntraMUSCular route once for 1 dose. Indications: PREVENTION OF STREPTOCOCCUS PNEUMONIAE INFECTION Prescriptions Sent to Pharmacy Refills  
 pneumococcal 23-valent (PNEUMOVAX 23) 25 mcg/0.5 mL injection 0 Si.5 mL by IntraMUSCular route once for 1 dose. Indications: PREVENTION OF STREPTOCOCCUS PNEUMONIAE INFECTION Class: Normal  
 Pharmacy: [facility] [address] [place] [address],  [facility] of [address] [place] [address] #: [phone] Route: IntraMUSCular  
 amLODIPine (NORVASC) 5 mg tablet 3 Sig: Take 1 Tab by mouth daily. Indications: hypertension  Class: Normal  
 Pharmacy: [facility] [address] [place] [address],  [facility] [address] AT [facility] of [address] Ph #: [phone] Route: Oral  
  
We Performed the Following CBC WITH AUTOMATED DIFF [64924 CPT(R)] HEMOGLOBIN A1C WITH EAG [58037 CPT(R)] INFLUENZA VIRUS VACCINE, HIGH DOSE SEASONAL, PRESERVATIVE FREE [13302 CPT(R)] LIPID PANEL [38912 CPT(R)] METABOLIC PANEL, COMPREHENSIVE [29460 CPT(R)] CO IMMUNIZ ADMIN,1 SINGLE/COMB VAC/TOXOID [id number] CPT(R)] REFERRAL FOR COLONOSCOPY [NDS196 Custom] TSH RFX ON ABNORMAL TO FREE T4 [[id number] Custom] Follow-up Instructions Return in about 6 weeks (around 11/9/2017) for CPE, [facility] Wellness. To-Do List   
 10/05/2017 ECHO:  ECHO TTE STRESS EXRCSE COMP W OR WO CONTR   
  
 10/10/2017 Imaging:  [facility] MAMMO BI SCREENING INCL CAD   
  
 10/31/2017 Imaging:  DEXA BONE DENSITY STUDY AXIAL Referral Information Referral ID Referred By Referred To  
  
 [id number] [address] Visits Status Start Date End Date 1 Authorized 9/28/17 9/28/18 If your referral has a status of pending review or denied, additional information will be sent to support the outcome of this decision. Patient Instructions 1. Start taking amlodipine 5mg, 1 tab daily for your high blood pressure 2. Schedule your mammogram, colonoscopy, DEXA scan, and Echo Stress test 
 
3. Have labs done at lab whit- you will need to be fasting 4. Return in 6 weeks for BP recheck Vaccine Information Statement Influenza (Flu) Vaccine (Inactivated or Recombinant): What you need to know Many Vaccine Information Statements are available in [place] and other languages. See www.immunize.org/vis Hojas de Información Sobre Vacunas están disponibles en Español y en muchos otros idiomas. Visite www.immunize.org/vis 1. Why get vaccinated? Influenza (flu) is a contagious disease that spreads around the [facility] every year, usually between October and May. Flu is caused by influenza viruses, and is spread mainly by coughing, sneezing, and close contact. Anyone can get flu. Flu strikes suddenly and can last several days. Symptoms vary by age, but can include: 
 fever/chills  sore throat  muscle aches  fatigue  cough  headache  runny or stuffy nose Flu can also lead to pneumonia and blood infections, and cause diarrhea and seizures in children. If you have a medical condition, such as heart or lung disease, flu can make it worse. Flu is more dangerous for some people. Infants and young children, people 72years of age and older, pregnant women, and people with certain health conditions or a weakened immune system are at greatest risk. Each year thousands of people in the [facility] die from flu, and many more are hospitalized. Flu vaccine can: 
 keep you from getting flu, 
 make flu less severe if you do get it, and 
 keep you from spreading flu to your family and other people. 2. Inactivated and recombinant flu vaccines A dose of flu vaccine is recommended every flu season. Children 6 months through 6years of age may need two doses during the same flu season. Everyone else needs only one dose each flu season. Some inactivated flu vaccines contain a very small amount of a mercury-based preservative called thimerosal. Studies have not shown thimerosal in vaccines to be harmful, but flu vaccines that do not contain thimerosal are available. There is no live flu virus in flu shots. They cannot cause the flu. There are many flu viruses, and they are always changing. Each year a new flu vaccine is made to protect against three or four viruses that are likely to cause disease in the upcoming flu season.  But even when the vaccine doesnt exactly match these viruses, it may still provide some protection Flu vaccine cannot prevent: 
 flu that is caused by a virus not covered by the vaccine, or 
 illnesses that look like flu but are not. It takes about 2 weeks for protection to develop after vaccination, and protection lasts through the flu season. 3. Some people should not get this vaccine Tell the person who is giving you the vaccine:  If you have any severe, life-threatening allergies. If you ever had a life-threatening allergic reaction after a dose of flu vaccine, or have a severe allergy to any part of this vaccine, you may be advised not to get vaccinated. Most, but not all, types of flu vaccine contain a small amount of egg protein.  If you ever had Guillain-Barré Syndrome (also called GBS). Some people with a history of GBS should not get this vaccine. This should be discussed with your doctor.  If you are not feeling well. It is usually okay to get flu vaccine when you have a mild illness, but you might be asked to come back when you feel better. 4. Risks of a vaccine reaction With any medicine, including vaccines, there is a chance of reactions. These are usually mild and go away on their own, but serious reactions are also possible. Most people who get a flu shot do not have any problems with it. Minor problems following a flu shot include:  
 soreness, redness, or swelling where the shot was given  hoarseness  sore, red or itchy eyes  cough  fever  aches  headache  itching  fatigue If these problems occur, they usually begin soon after the shot and last 1 or 2 days. More serious problems following a flu shot can include the following:  There may be a small increased risk of Guillain-Barré Syndrome (GBS) after inactivated flu vaccine.   This risk has been estimated at 1 or 2 additional cases per million people vaccinated. This is much lower than the risk of severe complications from flu, which can be prevented by flu vaccine.  Young children who get the flu shot along with pneumococcal vaccine (PCV13) and/or DTaP vaccine at the same time might be slightly more likely to have a seizure caused by fever. Ask your doctor for more information. Tell your doctor if a child who is getting flu vaccine has ever had a seizure. Problems that could happen after any injected vaccine:  People sometimes faint after a medical procedure, including vaccination. Sitting or lying down for about 15 minutes can help prevent fainting, and injuries caused by a fall. Tell your doctor if you feel dizzy, or have vision changes or ringing in the ears.  Some people get severe pain in the shoulder and have difficulty moving the arm where a shot was given. This happens very rarely.  Any medication can cause a severe allergic reaction. Such reactions from a vaccine are very rare, estimated at about 1 in a million doses, and would happen within a few minutes to a few hours after the vaccination. As with any medicine, there is a very remote chance of a vaccine causing a serious injury or death. The safety of vaccines is always being monitored. For more information, visit: www.cdc.gov/vaccinesafety/ 
 
5. What if there is a serious reaction? What should I look for?  Look for anything that concerns you, such as signs of a severe allergic reaction, very high fever, or unusual behavior. Signs of a severe allergic reaction can include hives, swelling of the face and throat, difficulty breathing, a fast heartbeat, dizziness, and weakness  usually within a few minutes to a few hours after the vaccination. What should I do?  
 
 If you think it is a severe allergic reaction or other emergency that cant wait, call 9-1-1 and get the person to the nearest hospital. Otherwise, call your doctor.  Reactions should be reported to the Vaccine Adverse Event Reporting System (VAERS). Your doctor should file this report, or you can do it yourself through  the VAERS web site at www.vaers. hhs.gov, or by calling 5-465.911.6524. VAERS does not give medical advice. 6. The National Vaccine Injury Compensation Program 
 
The [facility] Vaccine Injury Compensation Program (VICP) is a federal program that was created to compensate people who may have been injured by certain vaccines. Persons who believe they may have been injured by a vaccine can learn about the program and about filing a claim by calling 3-263.389.3301 or visiting the [employer] website at www.[facility].gov/vaccinecompensation. There is a time limit to file a claim for compensation. 7. How can I learn more?  Ask your healthcare provider. He or she can give you the vaccine package insert or suggest other sources of information.  Call your local or state health department.  Contact the Centers for Disease Control and Prevention (CDC): 
- Call 1-100.409.1317 (8-657-MWV-INFO) or 
- Visit CDCs website at www.cdc.gov/flu Vaccine Information Statement Inactivated Influenza Vaccine 8/7/2015 
[address] [id number]-[id number] Department of Health and [employer] Centers for Disease Control and Prevention Office Use Only [facility] HEALTH SERVICES! [facility] introduces Watchsend patient portal. Now you can access parts of your medical record, email your doctor's office, and request medication refills online. 1. In your internet browser, go to [email or web address]. [employer]/[address] [employer] 2. Click on the First Time User? Click Here link in the Sign In box. You will see the New Member Sign Up page. 3. Enter your Watchsend Access Code exactly as it appears below. You will not need to use this code after youve completed the sign-up process.  If you do not sign up before the expiration date, you must request a new code. 
 
· Manta Access Code: [email or web address] Expires: 12/27/2017  1:45 PM 
 
4. Enter the last four digits of your Social Security Number (xxxx) and Date of Birth (mm/dd/yyyy) as indicated and click Submit. You will be taken to the next sign-up page. 5. Create a Manta ID. This will be your Manta login ID and cannot be changed, so think of one that is secure and easy to remember. 6. Create a Manta password. You can change your password at any time. 7. Enter your Password Reset Question and Answer. This can be used at a later time if you forget your password. 8. Enter your e-mail address. You will receive e-mail notification when new information is available in [address] 9. Click Sign Up. You can now view and download portions of your medical record. 10. Click the Download Summary menu link to download a portable copy of your medical information. If you have questions, please visit the Frequently Asked Questions section of the Manta website. Remember, Manta is NOT to be used for urgent needs. For medical emergencies, dial 911. Now available from your iPhone and Android! Please provide this summary of care documentation to your next provider. Your primary care clinician is listed as [address]. If you have any questions after today's visit, please call [phone].

## 2017-09-28 NOTE — PROGRESS NOTES
Chief Complaint   Patient presents with   [facility] Establish Care     Previous [facility] patient    Annual Wellness Visit     1. Have you been to the ER, urgent care clinic since your last visit? Hospitalized since your last visit? No    2. Have you seen or consulted any other health care providers outside of the [address] [place] [address] since your last visit? Include any pap smears or colon screening.  No
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
## 2017-09-29 ENCOUNTER — HOSPITAL ENCOUNTER (OUTPATIENT)
Dept: LAB | Age: 72
Discharge: HOME | End: 2017-09-29
Payer: MEDICARE

## 2017-09-29 PROCEDURE — 36415 COLL VENOUS BLD VENIPUNCTURE: CPT

## 2017-09-29 PROCEDURE — 80053 COMPREHEN METABOLIC PANEL: CPT

## 2017-09-29 PROCEDURE — 80061 LIPID PANEL: CPT

## 2017-09-29 PROCEDURE — 83036 HEMOGLOBIN GLYCOSYLATED A1C: CPT

## 2017-09-29 PROCEDURE — 84443 ASSAY THYROID STIM HORMONE: CPT

## 2017-09-29 PROCEDURE — 85025 COMPLETE CBC W/AUTO DIFF WBC: CPT

## 2017-09-30 LAB
ALBUMIN SERPL-MCNC: 4.4 G/DL (ref 3.6–4.8)
ALBUMIN/GLOB SERPL: 1.5 {RATIO} (ref 1.2–2.2)
ALP SERPL-CCNC: 82 IU/L (ref 39–117)
ALT SERPL-CCNC: 22 IU/L (ref 0–32)
AST SERPL-CCNC: 19 IU/L (ref 0–40)
BASOPHILS # BLD AUTO: 0 X10E3/UL (ref 0–0.2)
BASOPHILS NFR BLD AUTO: 0 %
BILIRUB SERPL-MCNC: 0.6 MG/DL (ref 0–1.2)
BUN SERPL-MCNC: 12 MG/DL (ref 8–27)
BUN/CREAT SERPL: 17 (ref 12–28)
CALCIUM SERPL-MCNC: 9.9 MG/DL (ref 8.7–10.3)
CHLORIDE SERPL-SCNC: 102 MMOL/L (ref 96–106)
CHOLEST SERPL-MCNC: 173 MG/DL (ref 100–199)
CO2 SERPL-SCNC: 26 MMOL/L (ref 18–29)
CREAT SERPL-MCNC: 0.71 MG/DL (ref 0.57–1)
EOSINOPHIL # BLD AUTO: 0.3 X10E3/UL (ref 0–0.4)
EOSINOPHIL NFR BLD AUTO: 4 %
ERYTHROCYTE [DISTWIDTH] IN BLOOD BY AUTOMATED COUNT: 13.5 % (ref 12.3–15.4)
EST. AVERAGE GLUCOSE BLD GHB EST-MCNC: 120 MG/DL
GLOBULIN SER CALC-MCNC: 2.9 G/DL (ref 1.5–4.5)
GLUCOSE SERPL-MCNC: 103 MG/DL (ref 65–99)
HBA1C MFR BLD: 5.8 % (ref 4.8–5.6)
HCT VFR BLD AUTO: 41.5 % (ref 34–46.6)
HDLC SERPL-MCNC: 47 MG/DL
HGB BLD-MCNC: 13.8 G/DL (ref 11.1–15.9)
IMM GRANULOCYTES # BLD: 0 X10E3/UL (ref 0–0.1)
IMM GRANULOCYTES NFR BLD: 0 %
INTERPRETATION, 910389: NORMAL
LDLC SERPL CALC-MCNC: 89 MG/DL (ref 0–99)
LYMPHOCYTES # BLD AUTO: 3 X10E3/UL (ref 0.7–3.1)
LYMPHOCYTES NFR BLD AUTO: 40 %
MCH RBC QN AUTO: 29.8 PG (ref 26.6–33)
MCHC RBC AUTO-ENTMCNC: 33.3 G/DL (ref 31.5–35.7)
MCV RBC AUTO: 90 FL (ref 79–97)
MONOCYTES # BLD AUTO: 0.4 X10E3/UL (ref 0.1–0.9)
MONOCYTES NFR BLD AUTO: 6 %
NEUTROPHILS # BLD AUTO: 3.6 X10E3/UL (ref 1.4–7)
NEUTROPHILS NFR BLD AUTO: 50 %
PLATELET # BLD AUTO: 292 X10E3/UL (ref 150–379)
POTASSIUM SERPL-SCNC: 4.5 MMOL/L (ref 3.5–5.2)
PROT SERPL-MCNC: 7.3 G/DL (ref 6–8.5)
RBC # BLD AUTO: 4.63 X10E6/UL (ref 3.77–5.28)
SODIUM SERPL-SCNC: 142 MMOL/L (ref 134–144)
TRIGL SERPL-MCNC: 183 MG/DL (ref 0–149)
TSH SERPL DL<=0.005 MIU/L-ACNC: 2.82 UIU/ML (ref 0.45–4.5)
VLDLC SERPL CALC-MCNC: 37 MG/DL (ref 5–40)
WBC # BLD AUTO: 7.4 X10E3/UL (ref 3.4–10.8)

## 2017-10-02 NOTE — PROGRESS NOTES
Spoke with patient regarding results, elevated triglycerides, etc    Encouraged starting to exercise- start with walking or whatever she enjoys    Discussed limiting red meats, cheese, fried foods, alcohol    Will see pt back in 6 weeks for BP recheck    Loren Ruiz NP

## 2017-10-16 ENCOUNTER — TELEPHONE (OUTPATIENT)
Dept: INTERNAL MEDICINE CLINIC | Age: 72
End: 2017-10-16

## 2017-10-16 DIAGNOSIS — Z78.0 POST-MENOPAUSE: Primary | ICD-10-CM

## 2017-10-16 NOTE — TELEPHONE ENCOUNTER
[facility] stating that the diagnosis code for patient's bone density would not pass with medicare. They would need a new order with a new diagnosis.

## 2017-10-18 ENCOUNTER — HOSPITAL ENCOUNTER (OUTPATIENT)
Dept: MAMMOGRAPHY | Age: 72
Discharge: HOME | End: 2017-10-18
Attending: NURSE PRACTITIONER
Payer: MEDICARE

## 2017-10-18 ENCOUNTER — HOSPITAL ENCOUNTER (OUTPATIENT)
Dept: NON INVASIVE DIAGNOSTICS | Age: 72
Discharge: HOME | End: 2017-10-18
Attending: NURSE PRACTITIONER
Payer: MEDICARE

## 2017-10-18 DIAGNOSIS — Z78.0 POST-MENOPAUSE: ICD-10-CM

## 2017-10-18 DIAGNOSIS — Z00.00 MEDICARE ANNUAL WELLNESS VISIT, SUBSEQUENT: ICD-10-CM

## 2017-10-18 DIAGNOSIS — Z12.31 ENCOUNTER FOR SCREENING MAMMOGRAM FOR MALIGNANT NEOPLASM OF BREAST: ICD-10-CM

## 2017-10-18 DIAGNOSIS — R06.09 DYSPNEA ON EXERTION: ICD-10-CM

## 2017-10-18 DIAGNOSIS — Z23 ENCOUNTER FOR IMMUNIZATION: ICD-10-CM

## 2017-10-18 DIAGNOSIS — R00.2 HEART PALPITATIONS: ICD-10-CM

## 2017-10-18 LAB
ATTENDING PHYSICIAN, CST07: NORMAL
DIAGNOSIS, 93000: NORMAL
DUKE TM SCORE RESULT, CST14: NORMAL
DUKE TREADMILL SCORE, CST13: NORMAL
ECG INTERP BEFORE EX, CST11: NORMAL
ECG INTERP DURING EX, CST12: NORMAL
FUNCTIONAL CAPACITY, CST17: NORMAL
KNOWN CARDIAC CONDITION, CST08: NORMAL
MAX. DIASTOLIC BP, CST04: 70 MMHG
MAX. HEART RATE, CST05: 139 BPM
MAX. SYSTOLIC BP, CST03: 170 MMHG
OVERALL BP RESPONSE TO EXERCISE, CST16: NORMAL
OVERALL HR RESPONSE TO EXERCISE, CST15: NORMAL
PEAK EX METS, CST10: 4.6 METS
PROTOCOL NAME, CST01: NORMAL
TEST INDICATION, CST09: NORMAL

## 2017-10-18 PROCEDURE — 77080 DXA BONE DENSITY AXIAL: CPT

## 2017-10-18 PROCEDURE — 93351 STRESS TTE COMPLETE: CPT

## 2017-10-18 PROCEDURE — 77067 SCR MAMMO BI INCL CAD: CPT

## 2017-10-19 NOTE — TELEPHONE ENCOUNTER
Requested Prescriptions     Pending Prescriptions Disp Refills    losartan (COZAAR) 100 mg tablet 90 Tab 0     Last OV:09/28/17  Next OV:11/09/17           Pharmacy: [place] [id number], [id number] [address] AT [place] [address]

## 2017-10-20 RX ORDER — LOSARTAN POTASSIUM 100 MG/1
100 TABLET ORAL DAILY
Qty: 90 TAB | Refills: 1 | Status: SHIPPED | OUTPATIENT
Start: 2017-10-20 | End: 2018-04-19 | Stop reason: SDUPTHER

## 2017-10-20 NOTE — TELEPHONE ENCOUNTER
Last office visit - 09/28/17  Next office visit - 11/09/17  Last Refill - 07/17/17    Requested Prescriptions     Pending Prescriptions Disp Refills    losartan (COZAAR) 100 mg tablet 90 Tab 0

## 2017-12-05 ENCOUNTER — OFFICE VISIT (OUTPATIENT)
Dept: INTERNAL MEDICINE CLINIC | Age: 72
End: 2017-12-05

## 2017-12-05 VITALS
HEART RATE: 68 BPM | HEIGHT: 66 IN | TEMPERATURE: 99.1 F | DIASTOLIC BLOOD PRESSURE: 70 MMHG | RESPIRATION RATE: 14 BRPM | OXYGEN SATURATION: 97 % | SYSTOLIC BLOOD PRESSURE: 132 MMHG | BODY MASS INDEX: 36.64 KG/M2 | WEIGHT: 228 LBS

## 2017-12-05 DIAGNOSIS — Z23 ENCOUNTER FOR IMMUNIZATION: Primary | ICD-10-CM

## 2017-12-05 DIAGNOSIS — I10 ESSENTIAL HYPERTENSION: ICD-10-CM

## 2017-12-05 DIAGNOSIS — R00.2 INTERMITTENT PALPITATIONS: ICD-10-CM

## 2017-12-05 DIAGNOSIS — F32.A MOOD DISORDER OF DEPRESSED TYPE: ICD-10-CM

## 2017-12-05 NOTE — PROGRESS NOTES
Patient's identity verified with two patient identifiers (name and date of birth). 1. Have you been to the ER, urgent care clinic since your last visit? Hospitalized since your last visit? No  2. Have you seen or consulted any other health care providers outside of the [address] [place] [address] since your last visit? Include any pap smears or colon screening. No    Chief Complaint   Patient presents with    Hypertension     6 wk follow up     Not fasting. Health Maintenance Due   Topic Date Due    DTaP/Tdap/Td series (1 - Tdap) 11/06/1968    ZOSTER VACCINE AGE 60>   Done per patient, unsure of date, Bharat.  09/06/2007    GLAUCOMA SCREENING Q2Y  04/30/2017

## 2017-12-05 NOTE — MR AVS SNAPSHOT
Visit Information Date & Time Provider Department Dept. Phone Encounter #  
 12/5/2017 10:00 AM Irl Castleman, NP [address] [facility] [phone] Follow-up Instructions Return in about 4 months (around 4/5/2018) for htn. Upcoming Health Maintenance Date Due DTaP/Tdap/Td series (1 - Tdap) 11/6/1966 ZOSTER VACCINE AGE 60> 9/6/2007 GLAUCOMA SCREENING Q2Y 4/30/2017 MEDICARE YEARLY EXAM 9/29/2018 BREAST CANCER SCRN MAMMOGRAM 10/18/2019 COLONOSCOPY 10/10/2023 Allergies as of 12/5/2017  Review Complete On: 12/5/2017 By: Leonor Fishman Severity Noted Reaction Type Reactions Ace Inhibitors  09/20/2011   Side Effect Cough Thiazides  09/21/2011   Topical Photosensitivity Current Immunizations  Reviewed on 10/28/2015 Name Date Influenza High Dose Vaccine PF 9/28/2017, 10/28/2015 Influenza Vaccine 12/30/2014, 9/1/2013 Pneumococcal Polysaccharide (PPSV-23) 10/10/2013 11:42 AM  
  
 Not reviewed this visit Vitals BP Pulse Temp Resp Height(growth percentile) Weight(growth percentile) 132/70 68 99.1 °F (37.3 °C) (Oral) 14 5' 6\" (1.676 m) 228 lb (103.4 kg) SpO2 BMI OB Status Smoking Status 97% 36.8 kg/m2 Postmenopausal Never Smoker Vitals History BMI and BSA Data Body Mass Index Body Surface Area  
 36.8 kg/m 2 2.19 m 2 Preferred Pharmacy Pharmacy Name Phone [facility] [address] [place] [address], [address] [place] [address] [phone] Your Updated Medication List  
  
   
This list is accurate as of: 12/5/17 10:29 AM.  Always use your most recent med list. amLODIPine 5 mg tablet Commonly known as:  Elyn Coffer Take 1 Tab by mouth daily. Indications: hypertension  
  
 atorvastatin 40 mg tablet Commonly known as:  LIPITOR Take 1 Tab by mouth daily. losartan 100 mg tablet Commonly known as:  COZAAR Take 1 Tab by mouth daily. multivitamin tablet Commonly known as:  ONE A DAY Take 1 Tab by mouth daily. Follow-up Instructions Return in about 4 months (around 4/5/2018) for htn. Patient Instructions 1.  certirizine or loratadine daily 2. Nasacort nasal spray daily Introducing hospitals & HEALTH SERVICES! Antonio Guerrero introduces [employer] patient portal. Now you can access parts of your medical record, email your doctor's office, and request medication refills online. 1. In your internet browser, go to [email or web address] Butt 2. Click on the First Time User? Click Here link in the Sign In box. You will see the New Member Sign Up page. 3. Enter your [employer] Access Code exactly as it appears below. You will not need to use this code after youve completed the sign-up process. If you do not sign up before the expiration date, you must request a new code. · [employer] Access Code: [email or web address] Expires: 12/27/2017 12:45 PM 
 
4. Enter the last four digits of your Social Security Number (xxxx) and Date of Birth (mm/dd/yyyy) as indicated and click Submit. You will be taken to the next sign-up page. 5. Create a [employer] ID. This will be your [employer] login ID and cannot be changed, so think of one that is secure and easy to remember. 6. Create a [employer] password. You can change your password at any time. 7. Enter your Password Reset Question and Answer. This can be used at a later time if you forget your password. 8. Enter your e-mail address. You will receive e-mail notification when new information is available in [address] 9. Click Sign Up. You can now view and download portions of your medical record. 10. Click the Download Summary menu link to download a portable copy of your medical information.  
 
If you have questions, please visit the Frequently Asked Questions section of the [employer]. Remember, [employer] is NOT to be used for urgent needs. For medical emergencies, dial 911. Now available from your iPhone and Android! Please provide this summary of care documentation to your next provider. Your primary care clinician is listed as Toi Ortega. If you have any questions after today's visit, please call [phone].

## 2017-12-05 NOTE — PROGRESS NOTES
Subjective:      Mariana Hansen is a 81 y.o. female who presents today for HTN     HTN  Added amlodipine at the end of September  Also taking losartan  Has not been checking BP at home  No chest pain, dyspnea, HAs    Palpitations:  Continues to have palpitaitons in the morning, \"this is when I do my best thinking\"  Do not occur at other times  Stress echo normal  Declines holter monitor for evaluation    OA:  Legs and knees bother her all the time \"nothing new\"  Depends on her activity  Taking tylenol, tylenol pm helps her sleep  Has not seen an Orthopedist for about 18 months, saw Dr. Uday Farris    Depressed Mood:  Marriage difficulties  Sudden lss of husbands sister in law  Is a busy time  Not sure if she is depressed  Feels that she has a lot of demands on her, although children are grown  Middle daughter comes over almost daily, \"I could really do without that, shes difficult at times\"    Obesity:  Would like to lose weight  \"I just cant seem to focus on it\", \"I dont put myself first\"  Not exercising    Patient Active Problem List    Diagnosis Date Noted    H/O bone density study 12/10/2013    Pap smear for cervical cancer screening 11/20/2013    Prediabetes 10/11/2013    Personal history of colonic polyps 10/01/2013    Hypercholesteremia 10/01/2013    Obesity (BMI 35.0-39.9 without comorbidity) 10/01/2013    Benign essential hypertension 09/09/2011     Current Outpatient Prescriptions   Medication Sig Dispense Refill    diph,Pertuss,Acell,,Tet Vac-PF (ADACEL) 2 Lf-(2.5-5-3-5 mcg)-5Lf/0.5 mL susp 0.5 mL by IntraMUSCular route once for 1 dose. Indications: DIPHTHERIA-PERTUSSIS-TETANUS COMBINED PREVENTION 1 Syringe 0    losartan (COZAAR) 100 mg tablet Take 1 Tab by mouth daily. 90 Tab 1    amLODIPine (NORVASC) 5 mg tablet Take 1 Tab by mouth daily. Indications: hypertension 30 Tab 3    atorvastatin (LIPITOR) 40 mg tablet Take 1 Tab by mouth daily.  90 Tab 3    multivitamin (ONE A DAY) tablet Take 1 Tab by mouth daily. Review of Systems    Pertinent items are noted in HPI. Objective:     Visit Vitals    /70    Pulse 68    Temp 99.1 °F (37.3 °C) (Oral)    Resp 14    Ht 5' 6\" (1.676 m)    Wt 228 lb (103.4 kg)    SpO2 97%    BMI 36.8 kg/m2     General appearance: alert, cooperative, no distress, appears stated age  Head: Normocephalic, without obvious abnormality, atraumatic  Neck: supple, symmetrical, trachea midline, no carotid bruit and no JVD  Lungs: clear to auscultation bilaterally  Heart: regular rate and rhythm, S1, S2 normal, no murmur, click, rub or gallop  Extremities: extremities normal, atraumatic, no cyanosis or edema  Skin: Skin color, texture, turgor normal. No rashes or lesions  Neurologic: Grossly normal  Psych: calm, cooperative, nonsuidical    Assessment/Plan:     1. Essential hypertension  - at goal  - cont current meds    2. Intermittent palpitations  - declines Holter monitor for further evaluation today  - possible anxiety related    3. BMI 36.0-36.9,adult  - interested in losing weight, back lacks motivation  - encouraged exercise, diet changes, weight loss    4. Mood disorder of depressed type  - declines medications today  - nonsuicidal    5. Encounter for immunization  - diph,Pertuss,Acell,,Tet Vac-PF (ADACEL) 2 Lf-(2.5-5-3-5 mcg)-5Lf/0.5 mL susp; 0.5 mL by IntraMUSCular route once for 1 dose. Indications: DIPHTHERIA-PERTUSSIS-TETANUS COMBINED PREVENTION  Dispense: 1 Syringe; Refill: 0    Advised her to call back or return to office if symptoms worsen/change/persist.  Discussed expected course/resolution/complications of diagnosis in detail with patient. Medication risks/benefits/costs/interactions/alternatives discussed with patient. She was given an after visit summary which includes diagnoses, current medications, & vitals. She expressed understanding with the diagnosis and plan.     ZOHRA Barajas

## 2018-01-15 DIAGNOSIS — I10 BENIGN ESSENTIAL HYPERTENSION: ICD-10-CM

## 2018-01-15 RX ORDER — AMLODIPINE BESYLATE 5 MG/1
5 TABLET ORAL DAILY
Qty: 90 TAB | Refills: 1 | Status: SHIPPED | OUTPATIENT
Start: 2018-01-15 | End: 2018-07-18 | Stop reason: SDUPTHER

## 2018-01-15 NOTE — TELEPHONE ENCOUNTER
Last office visit- 12/05/17  Next office visit- 4/10/18  Last Refill- 9/28/17    Requested Prescriptions     Pending Prescriptions Disp Refills    amLODIPine (NORVASC) 5 mg tablet 30 Tab 3     Sig: Take 1 Tab by mouth daily.  Indications: hypertension

## 2018-04-10 ENCOUNTER — OFFICE VISIT (OUTPATIENT)
Dept: INTERNAL MEDICINE CLINIC | Age: 73
End: 2018-04-10

## 2018-04-10 VITALS
DIASTOLIC BLOOD PRESSURE: 60 MMHG | HEART RATE: 71 BPM | RESPIRATION RATE: 16 BRPM | TEMPERATURE: 99.1 F | BODY MASS INDEX: 38.09 KG/M2 | WEIGHT: 237 LBS | OXYGEN SATURATION: 96 % | HEIGHT: 66 IN | SYSTOLIC BLOOD PRESSURE: 130 MMHG

## 2018-04-10 DIAGNOSIS — R73.03 PREDIABETES: ICD-10-CM

## 2018-04-10 DIAGNOSIS — F43.9 STRESS AT HOME: ICD-10-CM

## 2018-04-10 DIAGNOSIS — Z23 ENCOUNTER FOR IMMUNIZATION: Primary | ICD-10-CM

## 2018-04-10 DIAGNOSIS — E78.00 HYPERCHOLESTEREMIA: ICD-10-CM

## 2018-04-10 DIAGNOSIS — I10 BENIGN ESSENTIAL HYPERTENSION: ICD-10-CM

## 2018-04-10 NOTE — PROGRESS NOTES
Subjective:      Elissa Galicia is a 81 y.o. female who presents today for follow up of HTN    HTN:  Taking losartan and amlodipine  Does not check BP at home  No regular exercise  Denies chest pain, palpitaitons, dyspnea, LE edema, HAs, or dizziness    Last eye exam: 3 weeks ago. 3/26/18. Goes to Dr. Susana Barreto. Has a family history of macular degeneration. Now taking eye vitamins    Hyperlipidemia: taking atorvastatin. Denies myalgias    Obesity/Prediabetes: no medications. No regular exercise. Gained weight over the holidays     Depression: has marriage difficulties ( with recent surgery for retinal detachment), recent sudden death of sister in law, cat of 21 years just passed away 1 week ago. Has had some family drama with her sisters (has 2), is not currently speaking with either. Is not sleeping. Knows that she would sleep better if she began to exercise. Does not want medication or counseling referral    Palpitations: intermittent, only in the morning, continues. Has had Stress Echo which was normal 10/2017. Declines holter monitor for evaluation    Patient Active Problem List    Diagnosis Date Noted    H/O bone density study 12/10/2013    Pap smear for cervical cancer screening 11/20/2013    Prediabetes 10/11/2013    Personal history of colonic polyps 10/01/2013    Hypercholesteremia 10/01/2013    Obesity (BMI 35.0-39.9 without comorbidity) 10/01/2013    Benign essential hypertension 09/09/2011     Current Outpatient Prescriptions   Medication Sig Dispense Refill    amLODIPine (NORVASC) 5 mg tablet Take 1 Tab by mouth daily. Indications: hypertension 90 Tab 1    losartan (COZAAR) 100 mg tablet Take 1 Tab by mouth daily. 90 Tab 1    atorvastatin (LIPITOR) 40 mg tablet Take 1 Tab by mouth daily. 90 Tab 3    multivitamin (ONE A DAY) tablet Take 1 Tab by mouth daily. Review of Systems    Pertinent items are noted in HPI.      Objective:     Visit Vitals    /60 (BP 1 Location: Left arm, BP Patient Position: Sitting)    Pulse 71    Temp 99.1 °F (37.3 °C) (Oral)    Resp 16    Ht 5' 6\" (1.676 m)    Wt 237 lb (107.5 kg)    SpO2 96%    BMI 38.25 kg/m2     General appearance: alert, cooperative, no distress, appears stated age  Head: Normocephalic, without obvious abnormality, atraumatic  Lungs: clear to auscultation bilaterally  Heart: regular rate and rhythm, S1, S2 normal, no murmur, click, rub or gallop  Extremities: extremities normal, atraumatic, trace BLE edema  Pulses: 2+ and symmetric pedal pulses  Skin: warm and dry  Neurologic: Grossly normal  Psych: calm, cooperative, nonsuicidal    Assessment/Plan:     1. Benign essential hypertension  - at goal  - cont current meds  - needs to start regular exercise regimen  - METABOLIC PANEL, COMPREHENSIVE  - CBC WITH AUTOMATED DIFF    2. Hypercholesteremia  - cont meds  - needs to start regular exercise regimen  - LIPID PANEL  - METABOLIC PANEL, COMPREHENSIVE    3. Prediabetes  - no meds  - needs to start regular exercise regimen  - HEMOGLOBIN A1C WITH EAG  - METABOLIC PANEL, COMPREHENSIVE    4. Stress at home  - declines medication or psych/counseling referral today  - counseling resource sheet given to patient  - nonsuicidal    5. Encounter for immunization  - reviewed immunization recommendations with patient. Last TDap > 10 years ago. Is around a new baby  - varicella-zoster recombinant, PF, (SHINGRIX) 50 mcg/0.5 mL susr injection; 0.5 mL by IntraMUSCular route once for 1 dose. Indications: PREVENTION OF HERPES ZOSTER  Dispense: 0.5 mL; Refill: 1  - diph,Pertuss,Acell,,Tet Vac-PF (ADACEL) 2 Lf-(2.5-5-3-5 mcg)-5Lf/0.5 mL susp; 0.5 mL by IntraMUSCular route once for 1 dose. Indications: DIPHTHERIA-PERTUSSIS-TETANUS COMBINED PREVENTION  Dispense: 1 Syringe;  Refill: 0      Advised her to call back or return to office if symptoms worsen/change/persist.  Discussed expected course/resolution/complications of diagnosis in detail with patient. Medication risks/benefits/costs/interactions/alternatives discussed with patient. She was given an after visit summary which includes diagnoses, current medications, & vitals. She expressed understanding with the diagnosis and plan.     ZOHRA Lara

## 2018-04-10 NOTE — PROGRESS NOTES
1. Have you been to the ER, urgent care clinic since your last visit? Hospitalized since your last visit? No    2. Have you seen or consulted any other health care providers outside of the [address] [place] [address] since your last visit? Include any pap smears or colon screening.  No  Chief Complaint   Patient presents with    Hypertension    Arm Pain     dull ache right lukas morning from [place] to shoulder

## 2018-04-10 NOTE — PATIENT INSTRUCTIONS
Let me know if you change your mind about a counseling referral or medication    [facility] [phone]   1326 [facility] 6 [phone])  Phone [phone]  Phone [phone] End Office  Phone [phone] 7239 Kulwant Mercer   [email or web address]  Locations: [place] [phone], [place] [phone], [facility] End [phone], [facility] 595 [facility]  [email or web address]  [address]  [phone]    [facility]  [email or web address]  [address]: [phone] (x1)  Dr. Jessica Trimble: [phone] (x2)    LORELEI WILKINS PROVIDERS:  [facility]  Eli Cortes MD (psychiatrist)  [address] [facility],5Th Floor [facility] 79.    Robert Henderson (counselor)    [facility]  [phone]

## 2018-04-10 NOTE — MR AVS SNAPSHOT
727 [facility], [address] [place], [place] [phone] Patient: Andre Harris MRN: [id number] :1945 Visit Information Date & Time Provider Department Dept. Phone Encounter #  
 4/10/2018  2:00 PM JOSIANE Tineo  [facility] [phone] [id number] Follow-up Instructions Return in about 4 months (around 8/10/2018) for HTN, XOL, predm. Upcoming Health Maintenance Date Due DTaP/Tdap/Td series (1 - Tdap) 1966 ZOSTER VACCINE AGE 60> 2007 MEDICARE YEARLY EXAM 2018 BREAST CANCER SCRN MAMMOGRAM 10/18/2019 GLAUCOMA SCREENING Q2Y 3/26/2020 COLONOSCOPY 10/10/2023 Allergies as of 4/10/2018  Review Complete On: 4/10/2018 By: Jessenia Sylvester NP Severity Noted Reaction Type Reactions Ace Inhibitors  2011   Side Effect Cough Thiazides  2011   Topical Photosensitivity Current Immunizations  Reviewed on 10/28/2015 Name Date Influenza High Dose Vaccine PF 2017, 10/28/2015 Influenza Vaccine 2014, 2013 Pneumococcal Polysaccharide (PPSV-23) 10/10/2013 11:42 AM  
  
 Not reviewed this visit You Were Diagnosed With   
  
 Codes Comments Encounter for immunization    -  Primary ICD-10-CM: H30 ICD-9-CM: V03.89 Benign essential hypertension     ICD-10-CM: I10 
ICD-9-CM: 401.1 Hypercholesteremia     ICD-10-CM: E78.00 ICD-9-CM: 272.0 Prediabetes     ICD-10-CM: R73.03 
ICD-9-CM: 790.29 Vitals BP Pulse Temp Resp Height(growth percentile) Weight(growth percentile) 130/60 (BP 1 Location: Left arm, BP Patient Position: Sitting) 71 99.1 °F (37.3 °C) (Oral) 16 5' 6\" (1.676 m) 237 lb (107.5 kg) SpO2 BMI OB Status Smoking Status 96% 38.25 kg/m2 Postmenopausal Never Smoker Vitals History BMI and BSA Data  Body Mass Index Body Surface Area  
 38.25 kg/m 2 2.24 m 2  
  
  
 Preferred Pharmacy Pharmacy Name Phone [facility] [address], [address] [phone] Your Updated Medication List  
  
   
This list is accurate as of 4/10/18  2:52 PM.  Always use your most recent med list. amLODIPine 5 mg tablet Commonly known as:  Jacquetta Couch Take 1 Tab by mouth daily. Indications: hypertension  
  
 atorvastatin 40 mg tablet Commonly known as:  LIPITOR Take 1 Tab by mouth daily. diph,Pertuss(Acell),Tet Vac-PF 2 Lf-(2.5-5-3-5 mcg)-5Lf/0.5 mL susp Commonly known as:  ADACEL  
0.5 mL by IntraMUSCular route once for 1 dose. Indications: DIPHTHERIA-PERTUSSIS-TETANUS COMBINED PREVENTION  
  
 losartan 100 mg tablet Commonly known as:  COZAAR Take 1 Tab by mouth daily. multivitamin tablet Commonly known as:  ONE A DAY Take 1 Tab by mouth daily. PRESERVISION AREDS 2 PO Take  by mouth.  
  
 varicella-zoster recombinant (PF) 50 mcg/0.5 mL Susr injection Commonly known as:  SHINGRIX  
0.5 mL by IntraMUSCular route once for 1 dose. Indications: PREVENTION OF HERPES ZOSTER Prescriptions Printed Refills  
 varicella-zoster recombinant, PF, (SHINGRIX) 50 mcg/0.5 mL susr injection 1 Si.5 mL by IntraMUSCular route once for 1 dose. Indications: PREVENTION OF HERPES ZOSTER Class: Print Route: IntraMUSCular  
 diph,Pertuss,Acell,,Tet Vac-PF (ADACEL) 2 Lf-(2.5-5-3-5 mcg)-5Lf/0.5 mL susp 0 Si.5 mL by IntraMUSCular route once for 1 dose. Indications: DIPHTHERIA-PERTUSSIS-TETANUS COMBINED PREVENTION Class: Print Route: IntraMUSCular We Performed the Following CBC WITH AUTOMATED DIFF [47086 CPT(R)] HEMOGLOBIN A1C WITH EAG [63515 CPT(R)] LIPID PANEL [62003 CPT(R)] METABOLIC PANEL, COMPREHENSIVE [92863 CPT(R)] Follow-up Instructions Return in about 4 months (around 8/10/2018) for HTN, XOL, predm. Patient Instructions Let me know if you change your mind about a counseling referral or medication [facility] Phone [phone] 1328 [facility] 6 (Main) Phone [phone] 208 [facility] Phone [phone] 8064 [facility] Phone [phone] 0643 Kul[address] Mercer  
[email or web address] 
Locations: [address] [place] [address] [phone], [address] [place] [address],4Th Floor, [address] [place] [address] [phone] [facility] 
[email or web address] 
1224 [facility], [address] 
[phone] [facility] 
[email or web address] 
[address] [facility] [address] Travon Ford: [phone] (x1) Dr. Darryle Bower: [phone] (x2) [facility] PROVIDERS: 
[facility] Serena Singh MD (psychiatrist) [address] [place] [address] [address] 
[phone] Shamir Muhammad LCSW (counselor)   [facility] 
[phone] Introducing \[facility] [place] [facility]\"" & HEALTH SERVICES! [facility] introduces "[employer] patient portal. Now you can access parts of your medical record, email your doctor's office, and request medication refills online. 1. In your internet browser, go to [email or web address] 2. Click on the First Time User? Click Here link in the Sign In box. You will see the New Member Sign Up page. 3. Enter your "[employer] Access Code exactly as it appears below. You will not need to use this code after youve completed the sign-up process. If you do not sign up before the expiration date, you must request a new code. · "[employer] Access Code: [email or web address] Expires: 7/9/2018  2:52 PM 
 
4.  Enter the last four digits of your Social Security Number (xxxx) and Date of Birth (mm/dd/yyyy) as indicated and click Submit. You will be taken to the next sign-up page. 5. Create a [employer] ID. This will be your [employer] login ID and cannot be changed, so think of one that is secure and easy to remember. 6. Create a [employer] password. You can change your password at any time. 7. Enter your Password Reset Question and Answer. This can be used at a later time if you forget your password. 8. Enter your e-mail address. You will receive e-mail notification when new information is available in [address] 9. Click Sign Up. You can now view and download portions of your medical record. 10. Click the Download Summary menu link to download a portable copy of your medical information. If you have questions, please visit the Frequently Asked Questions section of the [employer] website. Remember, [employer] is NOT to be used for urgent needs. For medical emergencies, dial 911. Now available from your iPhone and Android! Please provide this summary of care documentation to your next provider. Your primary care clinician is listed as Rozina Norton. If you have any questions after today's visit, please call [phone].

## 2018-04-13 ENCOUNTER — HOSPITAL ENCOUNTER (OUTPATIENT)
Dept: LAB | Age: 73
Discharge: HOME | End: 2018-04-13
Payer: MEDICARE

## 2018-04-13 PROCEDURE — 80061 LIPID PANEL: CPT

## 2018-04-13 PROCEDURE — 36415 COLL VENOUS BLD VENIPUNCTURE: CPT

## 2018-04-13 PROCEDURE — 85025 COMPLETE CBC W/AUTO DIFF WBC: CPT

## 2018-04-13 PROCEDURE — 83036 HEMOGLOBIN GLYCOSYLATED A1C: CPT

## 2018-04-13 PROCEDURE — 80053 COMPREHEN METABOLIC PANEL: CPT

## 2018-04-14 LAB
ALBUMIN SERPL-MCNC: 4.6 G/DL (ref 3.5–4.8)
ALBUMIN/GLOB SERPL: 1.5 {RATIO} (ref 1.2–2.2)
ALP SERPL-CCNC: 80 IU/L (ref 39–117)
ALT SERPL-CCNC: 24 IU/L (ref 0–32)
AST SERPL-CCNC: 19 IU/L (ref 0–40)
BASOPHILS # BLD AUTO: 0 X10E3/UL (ref 0–0.2)
BASOPHILS NFR BLD AUTO: 0 %
BILIRUB SERPL-MCNC: 0.9 MG/DL (ref 0–1.2)
BUN SERPL-MCNC: 22 MG/DL (ref 8–27)
BUN/CREAT SERPL: 30 (ref 12–28)
CALCIUM SERPL-MCNC: 9.8 MG/DL (ref 8.7–10.3)
CHLORIDE SERPL-SCNC: 102 MMOL/L (ref 96–106)
CHOLEST SERPL-MCNC: 201 MG/DL (ref 100–199)
CO2 SERPL-SCNC: 23 MMOL/L (ref 18–29)
CREAT SERPL-MCNC: 0.74 MG/DL (ref 0.57–1)
EOSINOPHIL # BLD AUTO: 0.3 X10E3/UL (ref 0–0.4)
EOSINOPHIL NFR BLD AUTO: 4 %
ERYTHROCYTE [DISTWIDTH] IN BLOOD BY AUTOMATED COUNT: 13.8 % (ref 12.3–15.4)
EST. AVERAGE GLUCOSE BLD GHB EST-MCNC: 120 MG/DL
GFR SERPLBLD CREATININE-BSD FMLA CKD-EPI: 82 ML/MIN/1.73
GFR SERPLBLD CREATININE-BSD FMLA CKD-EPI: 95 ML/MIN/1.73
GLOBULIN SER CALC-MCNC: 3 G/DL (ref 1.5–4.5)
GLUCOSE SERPL-MCNC: 107 MG/DL (ref 65–99)
HBA1C MFR BLD: 5.8 % (ref 4.8–5.6)
HCT VFR BLD AUTO: 41.6 % (ref 34–46.6)
HDLC SERPL-MCNC: 52 MG/DL
HGB BLD-MCNC: 13.7 G/DL (ref 11.1–15.9)
IMM GRANULOCYTES # BLD: 0 X10E3/UL (ref 0–0.1)
IMM GRANULOCYTES NFR BLD: 0 %
INTERPRETATION, 910389: NORMAL
LDLC SERPL CALC-MCNC: 114 MG/DL (ref 0–99)
LYMPHOCYTES # BLD AUTO: 3.1 X10E3/UL (ref 0.7–3.1)
LYMPHOCYTES NFR BLD AUTO: 45 %
MCH RBC QN AUTO: 29.7 PG (ref 26.6–33)
MCHC RBC AUTO-ENTMCNC: 32.9 G/DL (ref 31.5–35.7)
MCV RBC AUTO: 90 FL (ref 79–97)
MONOCYTES # BLD AUTO: 0.3 X10E3/UL (ref 0.1–0.9)
MONOCYTES NFR BLD AUTO: 5 %
NEUTROPHILS # BLD AUTO: 3.2 X10E3/UL (ref 1.4–7)
NEUTROPHILS NFR BLD AUTO: 46 %
PLATELET # BLD AUTO: 249 X10E3/UL (ref 150–379)
POTASSIUM SERPL-SCNC: 4.4 MMOL/L (ref 3.5–5.2)
PROT SERPL-MCNC: 7.6 G/DL (ref 6–8.5)
RBC # BLD AUTO: 4.62 X10E6/UL (ref 3.77–5.28)
SODIUM SERPL-SCNC: 143 MMOL/L (ref 134–144)
TRIGL SERPL-MCNC: 177 MG/DL (ref 0–149)
VLDLC SERPL CALC-MCNC: 35 MG/DL (ref 5–40)
WBC # BLD AUTO: 7 X10E3/UL (ref 3.4–10.8)

## 2018-04-19 RX ORDER — ATORVASTATIN CALCIUM 40 MG/1
40 TABLET, FILM COATED ORAL DAILY
Qty: 90 TAB | Refills: 1 | Status: SHIPPED | OUTPATIENT
Start: 2018-04-19 | End: 2018-10-15 | Stop reason: SDUPTHER

## 2018-04-19 RX ORDER — LOSARTAN POTASSIUM 100 MG/1
100 TABLET ORAL DAILY
Qty: 90 TAB | Refills: 1 | Status: SHIPPED | OUTPATIENT
Start: 2018-04-19 | End: 2018-10-15 | Stop reason: SDUPTHER

## 2018-07-18 DIAGNOSIS — I10 BENIGN ESSENTIAL HYPERTENSION: ICD-10-CM

## 2018-07-19 RX ORDER — AMLODIPINE BESYLATE 5 MG/1
5 TABLET ORAL DAILY
Qty: 90 TAB | Refills: 0 | Status: SHIPPED | OUTPATIENT
Start: 2018-07-19 | End: 2018-10-15 | Stop reason: SDUPTHER

## 2018-10-15 DIAGNOSIS — E78.00 HYPERCHOLESTEREMIA: Primary | ICD-10-CM

## 2018-10-15 DIAGNOSIS — I10 BENIGN ESSENTIAL HYPERTENSION: ICD-10-CM

## 2018-10-15 RX ORDER — LOSARTAN POTASSIUM 100 MG/1
100 TABLET ORAL DAILY
Qty: 90 TAB | Refills: 0 | Status: SHIPPED | OUTPATIENT
Start: 2018-10-15 | End: 2019-01-15 | Stop reason: SDUPTHER

## 2018-10-15 RX ORDER — ATORVASTATIN CALCIUM 40 MG/1
40 TABLET, FILM COATED ORAL DAILY
Qty: 90 TAB | Refills: 0 | Status: SHIPPED | OUTPATIENT
Start: 2018-10-15 | End: 2019-01-15 | Stop reason: SDUPTHER

## 2018-10-15 RX ORDER — AMLODIPINE BESYLATE 5 MG/1
5 TABLET ORAL DAILY
Qty: 90 TAB | Refills: 0 | Status: SHIPPED | OUTPATIENT
Start: 2018-10-15 | End: 2019-01-15 | Stop reason: SDUPTHER

## 2018-10-15 NOTE — TELEPHONE ENCOUNTER
PCP: Марина Plascencia NP    Last appt: 4/10/2018  Future Appointments  Date Time Provider Jam Velázquez   10/19/2018 12:15 PM [facility]. [facility]     Last refill for atorvastatin and losartan was 4/ 19/2018  Last refill for  Amlodipine was 7/ 19/2018    Requested Prescriptions     Pending Prescriptions Disp Refills    amLODIPine (NORVASC) 5 mg tablet 90 Tab 0     Sig: Take 1 Tab by mouth daily. Needs office visit for further refills  Indications: hypertension    losartan (COZAAR) 100 mg tablet 90 Tab 1     Sig: Take 1 Tab by mouth daily.  atorvastatin (LIPITOR) 40 mg tablet 90 Tab 1     Sig: Take 1 Tab by mouth daily.

## 2018-10-15 NOTE — TELEPHONE ENCOUNTER
PCP: Марина Plascencia NP    Last appt: 4/10/2018  Future Appointments  Date Time Provider Jam Melania   10/19/2018 12:15 PM [facility]. [facility]     Last refill: 7/19/2019    Requested Prescriptions     Pending Prescriptions Disp Refills    amLODIPine (NORVASC) 5 mg tablet 90 Tab 0     Sig: Take 1 Tab by mouth daily.  Needs office visit for further refills  Indications: hypertension

## 2018-10-19 ENCOUNTER — HOSPITAL ENCOUNTER (OUTPATIENT)
Dept: MAMMOGRAPHY | Age: 73
Discharge: HOME | End: 2018-10-19
Attending: NURSE PRACTITIONER
Payer: MEDICARE

## 2018-10-19 DIAGNOSIS — Z12.31 VISIT FOR SCREENING MAMMOGRAM: ICD-10-CM

## 2018-10-19 PROCEDURE — 77067 SCR MAMMO BI INCL CAD: CPT

## 2018-11-19 ENCOUNTER — OFFICE VISIT (OUTPATIENT)
Dept: INTERNAL MEDICINE CLINIC | Age: 73
End: 2018-11-19

## 2018-11-19 VITALS
HEART RATE: 64 BPM | WEIGHT: 242 LBS | SYSTOLIC BLOOD PRESSURE: 138 MMHG | BODY MASS INDEX: 38.89 KG/M2 | DIASTOLIC BLOOD PRESSURE: 74 MMHG | OXYGEN SATURATION: 95 % | HEIGHT: 66 IN | RESPIRATION RATE: 15 BRPM | TEMPERATURE: 97.6 F

## 2018-11-19 DIAGNOSIS — R73.03 PREDIABETES: ICD-10-CM

## 2018-11-19 DIAGNOSIS — Z00.00 MEDICARE ANNUAL WELLNESS VISIT, SUBSEQUENT: Primary | ICD-10-CM

## 2018-11-19 DIAGNOSIS — E66.01 CLASS 2 SEVERE OBESITY DUE TO EXCESS CALORIES WITH SERIOUS COMORBIDITY AND BODY MASS INDEX (BMI) OF 39.0 TO 39.9 IN ADULT (HCC): ICD-10-CM

## 2018-11-19 DIAGNOSIS — I10 ESSENTIAL HYPERTENSION: ICD-10-CM

## 2018-11-19 DIAGNOSIS — M25.572 PAIN IN LEFT ANKLE AND JOINTS OF LEFT FOOT: ICD-10-CM

## 2018-11-19 DIAGNOSIS — E78.5 HYPERLIPIDEMIA, UNSPECIFIED HYPERLIPIDEMIA TYPE: ICD-10-CM

## 2018-11-19 NOTE — PROGRESS NOTES
Patient's identity verified with two patient identifiers (name and date of birth). Reviewed record in preparation for visit and have obtained necessary documentation. 1. Have you been to the ER, urgent care clinic since your last visit? Hospitalized since your last visit? No 
2. Have you seen or consulted any other health care providers outside of the [address] [place] [address] since your last visit? Include any pap smears or colon screening. No 
 
Chief Complaint Patient presents with  Hypertension 4 month follow up, overdue  Blood sugar problem 4 month follow up, overdue [facility] Annual Wellness Visit Medicare wellness due.  Cold Symptoms Started w/ sore throat, has improved. Nasal congestion, x9days. Mucous is \"discolored\". Cough in AM, Denies ear pain/fever/body aches. Has not tried anything OTC. Not fasting. Health Maintenance Due Topic Date Due  
 DTaP/Tdap/Td series (1 - Tdap) Patient reports has not had. 11/06/1968  Shingrix Vaccine Age 50> (1 of 2) Done 8/31/18, Bharat on file, had first one prior unsure of date. 11/06/1997  Influenza Age 5 to Adult 8/31/18 at [place] per pt. 08/01/2018  MEDICARE YEARLY EXAM  
due 09/29/2018 Wt Readings from Last 3 Encounters:  
11/19/18 242 lb (109.8 kg) 04/10/18 237 lb (107.5 kg) 12/05/17 228 lb (103.4 kg) Temp Readings from Last 3 Encounters:  
11/19/18 97.6 °F (36.4 °C) (Oral) 04/10/18 99.1 °F (37.3 °C) (Oral) 12/05/17 99.1 °F (37.3 °C) (Oral) BP Readings from Last 3 Encounters:  
11/19/18 138/74  
04/10/18 130/60  
12/05/17 132/70 Pulse Readings from Last 3 Encounters:  
11/19/18 64  
04/10/18 71  
12/05/17 68 Learning Assessment: 
:  
 
Learning Assessment 11/19/2018 10/28/2015 12/30/2014 PRIMARY LEARNER Patient Patient Patient HIGHEST LEVEL OF EDUCATION - PRIMARY LEARNER  4 YEARS OF COLLEGE 4 YEARS OF COLLEGE 4 YEARS OF COLLEGE  
BARRIERS PRIMARY LEARNER NONE NONE NONE CO-LEARNER CAREGIVER No No No  
PRIMARY LANGUAGE ENGLISH ENGLISH ENGLISH  NEED - No -  
LEARNER PREFERENCE PRIMARY PICTURES READING READING  
  VIDEOS - -  
LEARNING SPECIAL TOPICS - no -  
ANSWERED BY patient patient patient RELATIONSHIP SELF SELF SELF Abuse Screening: 
:  
 
Abuse Screening Questionnaire 11/19/2018 9/28/2017 10/28/2015 12/30/2014 Do you ever feel afraid of your partner? N N N N Are you in a relationship with someone who physically or mentally threatens you? N N N N Is it safe for you to go home?  Stanley Nielson

## 2018-11-19 NOTE — PROGRESS NOTES
Subjective:  
  
Wesley Hyde is a 72 y.o. female who presents today for [facility] Wellness Exam and f/u HTN, HLD Obesity: 
hasn't lost any weight Has had a lot of family drama and financial issues,  had eye surgery, dtr had miscarriage No regular exercise HTN: 
Taking losartan and amlodipine Having \"more than usual\" swelling of ankles and feet, feet \"hurt\". Aching, worse left ankle, lateral.  Salt intake poor Waking up every morning with \"heart palpitations\" 
  
Depression:  Refuses meds Knows she has probably internalized what has been going on this past year No current counseling Prediabetes: no meds Flu Shot: UTD Shingrix: UTD Annual Wellness Visit End of Life Planning: patient counseled on use and need of an Advanced Directive. The [place] Advanced Directive for Healthcare template given to patient for review and completion. Patient asked to provide us with a copy once it is completed. Info for MyPreventativeCare: Given to patient, see After Visit Summary Depression Screen: Patient Health Questionnaire (PHQ-2) Over the last 2 weeks, how often have you been bothered by any of the following problems? Little interest or pleasure in doing things? Not at all. [0] Feeling down, depressed, or hopeless? Not at all. [0] Total Score: 0/6 PHQ-2 Assessment Scoring: A score of 2 or more requires further screening with the PHQ-9 N/A Alcohol Risk Factor Screening: You do not drink alcohol or very rarely. 1 beer 1-2x/week Hearing Loss: 
Hearing is good. Activities of Daily Living: 
Self-care. Requires assistance with: no ADLs Fall Risk: 
No fall risk factors Abuse Screen: 
Patient is not abused Adult Nutrition Screen: No risk factors noted. Patient Active Problem List  
 Diagnosis Date Noted  Severe obesity ([facility] [address] 11/19/2018  H/O bone density study 12/10/2013  Pap smear for cervical cancer screening 11/20/2013  Prediabetes 10/11/2013  Personal history of colonic polyps 10/01/2013  Hypercholesteremia 10/01/2013  Obesity (BMI 35.0-39.9 without comorbidity) 10/01/2013  Benign essential hypertension 09/09/2011 Current Outpatient Medications Medication Sig Dispense Refill  amLODIPine (NORVASC) 5 mg tablet Take 1 Tab by mouth daily. Needs office visit for further refills  Indications: hypertension 90 Tab 0  
 losartan (COZAAR) 100 mg tablet Take 1 Tab by mouth daily. 90 Tab 0  
 atorvastatin (LIPITOR) 40 mg tablet Take 1 Tab by mouth daily. 90 Tab 0  
 VIT C/E/ZN/COPPR/LUTEIN/ZEAXAN (PRESERVISION AREDS 2 PO) Take  by mouth.  multivitamin (ONE A DAY) tablet Take 1 Tab by mouth daily. Review of Systems Pertinent items are noted in HPI. Objective:  
 
Visit Vitals /74 (BP 1 Location: Left arm, BP Patient Position: Sitting) Pulse 64 Temp 97.6 °F (36.4 °C) (Oral) Resp 15 Ht 5' 6\" (1.676 m) Wt 242 lb (109.8 kg) SpO2 95% BMI 39.06 kg/m² General appearance: alert, cooperative, no distress, appears stated age, obese Head: Normocephalic, without obvious abnormality, atraumatic Lungs: clear to auscultation bilaterally Heart: regular rate and rhythm, S1, S2 normal, no murmur, click, rub or gallop Extremities: trace edema BLE, 1+ edema left lateral ankle, steady gait, normal rom and strength Skin: Skin color, texture, turgor normal. No rashes or lesions, no bruising or erythema to foot or ankle Neurologic: Grossly normal 
Psych: calm, cooperative, appropriate affect Assessment/Plan: 1. Medicare annual wellness visit, subsequent 
- will obtain copies of immunizaiton administration 2. Pain in left ankle and joints of left foot 
- likely oa 
- reviewed weight loss with patient - tylenol prn 
- XR FOOT LT MIN 3 V; Future - XR ANKLE LT MIN 3 V; Future 3. Essential hypertension 
- cont current meds 
- weight loss, diet, exercise, encouraged 4. Hyperlipidemia, unspecified hyperlipidemia type 
- cont atorvastatin 
- as above - LIPID PANEL 
- METABOLIC PANEL, COMPREHENSIVE 5. Class 2 severe obesity due to excess calories with serious comorbidity and body mass index (BMI) of 39.0 to 39.9 in adult [facility]) - as above - LIPID PANEL 
- METABOLIC PANEL, COMPREHENSIVE 
- TSH 3RD GENERATION 6. Prediabetes 
- no meds 
- as above - METABOLIC PANEL, COMPREHENSIVE 
- TSH 3RD GENERATION 
- HEMOGLOBIN A1C WITH EAG 
- CBC WITH AUTOMATED DIFF Advised her to call back or return to office if symptoms worsen/change/persist. 
Discussed expected course/resolution/complications of diagnosis in detail with patient. Medication risks/benefits/costs/interactions/alternatives discussed with patient. She was given an after visit summary which includes diagnoses, current medications, & vitals. She expressed understanding with the diagnosis and plan.  
 
ZOHRA Olson

## 2018-11-20 ENCOUNTER — HOSPITAL ENCOUNTER (OUTPATIENT)
Dept: LAB | Age: 73
Discharge: HOME | End: 2018-11-20
Payer: MEDICARE

## 2018-11-20 ENCOUNTER — HOSPITAL ENCOUNTER (OUTPATIENT)
Dept: GENERAL RADIOLOGY | Age: 73
Discharge: HOME | End: 2018-11-20
Payer: MEDICARE

## 2018-11-20 DIAGNOSIS — M25.572 PAIN IN LEFT ANKLE AND JOINTS OF LEFT FOOT: ICD-10-CM

## 2018-11-20 PROCEDURE — 73630 X-RAY EXAM OF FOOT: CPT

## 2018-11-20 PROCEDURE — 36415 COLL VENOUS BLD VENIPUNCTURE: CPT

## 2018-11-20 PROCEDURE — 80061 LIPID PANEL: CPT

## 2018-11-20 PROCEDURE — 83036 HEMOGLOBIN GLYCOSYLATED A1C: CPT

## 2018-11-20 PROCEDURE — 85025 COMPLETE CBC W/AUTO DIFF WBC: CPT

## 2018-11-20 PROCEDURE — 73610 X-RAY EXAM OF ANKLE: CPT

## 2018-11-20 PROCEDURE — 84443 ASSAY THYROID STIM HORMONE: CPT

## 2018-11-20 PROCEDURE — 80053 COMPREHEN METABOLIC PANEL: CPT

## 2018-11-21 ENCOUNTER — TELEPHONE (OUTPATIENT)
Dept: INTERNAL MEDICINE CLINIC | Age: 73
End: 2018-11-21

## 2018-11-21 LAB
ALBUMIN SERPL-MCNC: 4.5 G/DL (ref 3.5–4.8)
ALBUMIN/GLOB SERPL: 1.5 {RATIO} (ref 1.2–2.2)
ALP SERPL-CCNC: 87 IU/L (ref 39–117)
ALT SERPL-CCNC: 23 IU/L (ref 0–32)
AST SERPL-CCNC: 21 IU/L (ref 0–40)
BASOPHILS # BLD AUTO: 0 X10E3/UL (ref 0–0.2)
BASOPHILS NFR BLD AUTO: 0 %
BILIRUB SERPL-MCNC: 0.9 MG/DL (ref 0–1.2)
BUN SERPL-MCNC: 16 MG/DL (ref 8–27)
BUN/CREAT SERPL: 24 (ref 12–28)
CALCIUM SERPL-MCNC: 9.4 MG/DL (ref 8.7–10.3)
CHLORIDE SERPL-SCNC: 105 MMOL/L (ref 96–106)
CHOLEST SERPL-MCNC: 186 MG/DL (ref 100–199)
CO2 SERPL-SCNC: 21 MMOL/L (ref 20–29)
CREAT SERPL-MCNC: 0.66 MG/DL (ref 0.57–1)
EOSINOPHIL # BLD AUTO: 0.2 X10E3/UL (ref 0–0.4)
EOSINOPHIL NFR BLD AUTO: 4 %
ERYTHROCYTE [DISTWIDTH] IN BLOOD BY AUTOMATED COUNT: 14.1 % (ref 12.3–15.4)
EST. AVERAGE GLUCOSE BLD GHB EST-MCNC: 128 MG/DL
GLOBULIN SER CALC-MCNC: 3 G/DL (ref 1.5–4.5)
GLUCOSE SERPL-MCNC: 108 MG/DL (ref 65–99)
HBA1C MFR BLD: 6.1 % (ref 4.8–5.6)
HCT VFR BLD AUTO: 43.5 % (ref 34–46.6)
HDLC SERPL-MCNC: 44 MG/DL
HGB BLD-MCNC: 14.4 G/DL (ref 11.1–15.9)
IMM GRANULOCYTES # BLD: 0 X10E3/UL (ref 0–0.1)
IMM GRANULOCYTES NFR BLD: 0 %
INTERPRETATION, 910389: NORMAL
LDLC SERPL CALC-MCNC: 107 MG/DL (ref 0–99)
LYMPHOCYTES # BLD AUTO: 2.7 X10E3/UL (ref 0.7–3.1)
LYMPHOCYTES NFR BLD AUTO: 46 %
MCH RBC QN AUTO: 30.3 PG (ref 26.6–33)
MCHC RBC AUTO-ENTMCNC: 33.1 G/DL (ref 31.5–35.7)
MCV RBC AUTO: 91 FL (ref 79–97)
MONOCYTES # BLD AUTO: 0.4 X10E3/UL (ref 0.1–0.9)
MONOCYTES NFR BLD AUTO: 6 %
NEUTROPHILS # BLD AUTO: 2.6 X10E3/UL (ref 1.4–7)
NEUTROPHILS NFR BLD AUTO: 44 %
PLATELET # BLD AUTO: 246 X10E3/UL (ref 150–379)
POTASSIUM SERPL-SCNC: 4.5 MMOL/L (ref 3.5–5.2)
PROT SERPL-MCNC: 7.5 G/DL (ref 6–8.5)
RBC # BLD AUTO: 4.76 X10E6/UL (ref 3.77–5.28)
SODIUM SERPL-SCNC: 143 MMOL/L (ref 134–144)
TRIGL SERPL-MCNC: 175 MG/DL (ref 0–149)
TSH SERPL DL<=0.005 MIU/L-ACNC: 2.65 UIU/ML (ref 0.45–4.5)
VLDLC SERPL CALC-MCNC: 35 MG/DL (ref 5–40)
WBC # BLD AUTO: 6 X10E3/UL (ref 3.4–10.8)

## 2018-11-21 NOTE — TELEPHONE ENCOUNTER
Follow up call to pt - verified self and birth date for privacy precautions. Pt was informed of SSP,NP's message/recommendations. ' immediately became frustrated with the information reported and questioned why only the L ankle was x-rayed. Pt states that she needs to \"absorb the information and think about it\". She verbalized understanding of the information provided, but requested the results and recommendations be sent via letter.

## 2018-11-21 NOTE — TELEPHONE ENCOUNTER
----- Message from Tommy Lundborg, NP sent at 11/21/2018  7:32 AM EST -----  Could you please let her know that her XRay should that she has some arthritis in her ankle and foot, which is the source of her pain. We had discussed this in clinic. Again, I recommend weight loss and tylenol arthritis (650mg) as needed. Thank you!     Tommy Lundborg, NP      ----- Message -----  From: Naman Hein Results In  Sent: 11/20/2018  11:12 AM  To: Tommy Lundborg, NP

## 2019-01-15 DIAGNOSIS — I10 BENIGN ESSENTIAL HYPERTENSION: ICD-10-CM

## 2019-01-15 DIAGNOSIS — E78.00 HYPERCHOLESTEREMIA: ICD-10-CM

## 2019-01-15 NOTE — TELEPHONE ENCOUNTER
PCP: Lynne Batista NP    Last appt: 11/19/2018  Future Appointments   Date Time Provider Jam Velázquez   5/20/2019 10:20 AM JOSIANE Santos VIVIENNE DANG       Requested Prescriptions     Pending Prescriptions Disp Refills    losartan (COZAAR) 100 mg tablet 90 Tab 0     Sig: Take 1 Tab by mouth daily.  atorvastatin (LIPITOR) 40 mg tablet 90 Tab 0     Sig: Take 1 Tab by mouth daily.  amLODIPine (NORVASC) 5 mg tablet 90 Tab 0     Sig: Take 1 Tab by mouth daily.  Needs office visit for further refills

## 2019-01-16 RX ORDER — ATORVASTATIN CALCIUM 40 MG/1
40 TABLET, FILM COATED ORAL DAILY
Qty: 90 TAB | Refills: 1 | Status: SHIPPED | OUTPATIENT
Start: 2019-01-16 | End: 2019-07-08 | Stop reason: SDUPTHER

## 2019-01-16 RX ORDER — AMLODIPINE BESYLATE 5 MG/1
5 TABLET ORAL DAILY
Qty: 90 TAB | Refills: 1 | Status: SHIPPED | OUTPATIENT
Start: 2019-01-16 | End: 2019-07-08 | Stop reason: SDUPTHER

## 2019-01-16 RX ORDER — LOSARTAN POTASSIUM 100 MG/1
100 TABLET ORAL DAILY
Qty: 90 TAB | Refills: 1 | Status: SHIPPED | OUTPATIENT
Start: 2019-01-16 | End: 2019-07-08 | Stop reason: SDUPTHER

## 2019-05-20 ENCOUNTER — OFFICE VISIT (OUTPATIENT)
Dept: INTERNAL MEDICINE CLINIC | Age: 74
End: 2019-05-20

## 2019-05-20 VITALS
HEIGHT: 66 IN | DIASTOLIC BLOOD PRESSURE: 68 MMHG | SYSTOLIC BLOOD PRESSURE: 132 MMHG | RESPIRATION RATE: 15 BRPM | BODY MASS INDEX: 37.12 KG/M2 | TEMPERATURE: 97.6 F | WEIGHT: 231 LBS | OXYGEN SATURATION: 94 % | HEART RATE: 67 BPM

## 2019-05-20 DIAGNOSIS — E78.00 HYPERCHOLESTEREMIA: ICD-10-CM

## 2019-05-20 DIAGNOSIS — R73.03 PREDIABETES: ICD-10-CM

## 2019-05-20 DIAGNOSIS — M25.561 CHRONIC PAIN OF BOTH KNEES: Primary | ICD-10-CM

## 2019-05-20 DIAGNOSIS — M25.562 CHRONIC PAIN OF BOTH KNEES: Primary | ICD-10-CM

## 2019-05-20 DIAGNOSIS — E66.9 OBESITY (BMI 35.0-39.9 WITHOUT COMORBIDITY): ICD-10-CM

## 2019-05-20 DIAGNOSIS — R09.89 BRUIT OF RIGHT CAROTID ARTERY: ICD-10-CM

## 2019-05-20 DIAGNOSIS — G89.29 CHRONIC PAIN OF BOTH KNEES: Primary | ICD-10-CM

## 2019-05-20 DIAGNOSIS — I10 BENIGN ESSENTIAL HYPERTENSION: ICD-10-CM

## 2019-05-20 RX ORDER — DEXTROMETHORPHAN HYDROBROMIDE, GUAIFENESIN 5; 100 MG/5ML; MG/5ML
650 LIQUID ORAL
COMMUNITY

## 2019-05-20 NOTE — PATIENT INSTRUCTIONS
Tylenol arthritis 1-2 tabs every 8 hours as needed, max 6 tabs a day    Schedule with [employer] daily baby aspirin    Schedule your carotid doppler, [phone]

## 2019-05-20 NOTE — PROGRESS NOTES
Patient's identity verified with two patient identifiers (name and date of birth). Reviewed record in preparation for visit and have obtained necessary documentation. 1. Have you been to the ER, urgent care clinic since your last visit? Hospitalized since your last visit? No  2. Have you seen or consulted any other health care providers outside of the [address] [place] [address] since your last visit? Include any pap smears or colon screening. No    Chief Complaint   Patient presents with    Hypertension     6 month follow up    Cholesterol Problem     6 month follow up      Not fasting. All vaccines listed on VIIS- printed & will update chart, place to be scanned. Health Maintenance Due   Topic Date Due    DTaP/Tdap/Td series (1 - Tdap)  Thinks has had at [employer]. 11/06/1968    Shingrix Vaccine Age 50> (1 of 2)  Done per pt, both, Bharat. 11/06/1997    Pneumococcal 65+ years (2 of 2 - PCV13)  Unsure, but thinks she has.  10/10/2014       Wt Readings from Last 3 Encounters:   05/20/19 231 lb (104.8 kg)   11/19/18 242 lb (109.8 kg)   04/10/18 237 lb (107.5 kg)     Temp Readings from Last 3 Encounters:   05/20/19 97.6 °F (36.4 °C) (Oral)   11/19/18 97.6 °F (36.4 °C) (Oral)   04/10/18 99.1 °F (37.3 °C) (Oral)     BP Readings from Last 3 Encounters:   05/20/19 132/68   11/19/18 138/74   04/10/18 130/60     Pulse Readings from Last 3 Encounters:   05/20/19 67   11/19/18 64   04/10/18 71       Learning Assessment:  :     Learning Assessment 5/20/2019 11/19/2018 10/28/2015 12/30/2014   PRIMARY LEARNER Patient Patient Patient Patient   HIGHEST LEVEL OF EDUCATION - PRIMARY LEARNER  4 YEARS OF COLLEGE 4 YEARS OF COLLEGE 4 YEARS OF COLLEGE 4 YEARS OF COLLEGE   BARRIERS PRIMARY LEARNER NONE NONE NONE NONE   CO-LEARNER CAREGIVER No No No No   PRIMARY LANGUAGE ENGLISH ENGLISH ENGLISH ENGLISH    NEED - - No -   LEARNER PREFERENCE PRIMARY READING PICTURES READING READING     - VIDEOS - -   LEARNING SPECIAL TOPICS - - no -   ANSWERED BY patient patient patient patient   RELATIONSHIP SELF SELF SELF SELF       Depression Screening:  :     3 most recent PHQ Screens 5/20/2019   Little interest or pleasure in doing things Not at all   Feeling down, depressed, irritable, or hopeless Not at all   Total Score PHQ 2 0       Fall Risk Assessment:  :     Fall Risk Assessment, last 12 mths 5/20/2019   Able to walk? Yes   Fall in past 12 months? No   Fall with injury? -   Number of falls in past 12 months -   Fall Risk Score -       Abuse Screening:  :     Abuse Screening Questionnaire 5/20/2019 11/19/2018 9/28/2017 10/28/2015 12/30/2014   Do you ever feel afraid of your partner? N N N N N   Are you in a relationship with someone who physically or mentally threatens you? N N N N N   Is it safe for you to go home?  Makenzie Condon

## 2019-05-20 NOTE — PROGRESS NOTES
Subjective:      Samy Parada is a 72 y.o. female who presents today for f/u HTN, HLD    Going to be a grandmother, baby boy due in September 13th    Today patient also wanting to discuss history of foot and ankle pain  xrays in 11/2018 showing mild degenerative changes  Got tylenol arthritis, takes prn and helps  Pain significantly improved    Now today reporting bilateral knee pain, L>R  History of torn meniscus left knee- saw Dr. Ronel Larry at [facility]  Right hip and upper thigh pain now also bothering her  No low back pain or groin pain  Has been walking with a limp. Gets worse the more active she is    Obesity:  Does not weight herself at home  Has lost 11lb since 11/2018    HTN:  Taking amlodipine and losartan  No chest pain, palpitations, dyspnea, headaches or dizziness  Normal stress echo 10/2017    HLD:  Taking atorvastatin  No myalgias    She enjoys being at home  2 daughters here in [place], helps them frequently      Patient Active Problem List    Diagnosis Date Noted    Severe obesity ([address] 11/19/2018    H/O bone density study 12/10/2013    Pap smear for cervical cancer screening 11/20/2013    Prediabetes 10/11/2013    Personal history of colonic polyps 10/01/2013    Hypercholesteremia 10/01/2013    Obesity (BMI 35.0-39.9 without comorbidity) 10/01/2013    Benign essential hypertension 09/09/2011     Current Outpatient Medications   Medication Sig Dispense Refill    acetaminophen (TYLENOL ARTHRITIS PAIN) 650 mg TbER Take 650 mg by mouth every six to eight (6-8) hours as needed.  acetaminophen/diphenhydramine (TYLENOL PM PO) Take  by mouth nightly as needed.  losartan (COZAAR) 100 mg tablet Take 1 Tab by mouth daily. 90 Tab 1    atorvastatin (LIPITOR) 40 mg tablet Take 1 Tab by mouth daily. 90 Tab 1    amLODIPine (NORVASC) 5 mg tablet Take 1 Tab by mouth daily. 90 Tab 1    VIT C/E/ZN/COPPR/LUTEIN/ZEAXAN (PRESERVISION AREDS 2 PO) Take  by mouth.       multivitamin (ONE A DAY) tablet Take 1 Tab by mouth daily. Review of Systems    Pertinent items are noted in HPI. Objective:     Visit Vitals  /68 (BP 1 Location: Left arm, BP Patient Position: Sitting)   Pulse 67   Temp 97.6 °F (36.4 °C) (Oral)   Resp 15   Ht 5' 6\" (1.676 m)   Wt 231 lb (104.8 kg)   SpO2 94%   BMI 37.28 kg/m²     General appearance: alert, cooperative, no distress, appears stated age, overweight  Head: Normocephalic, without obvious abnormality, atraumatic  Neck: + right carotid bruit, no left carotid bruit  Lungs: clear to auscultation bilaterally  Heart: regular rate and rhythm, S1, S2 normal, no murmur, click, rub or gallop  Extremities: mild swelling left lateral knee, + crepitus, no edema, steady gait  Skin: Skin color, texture, turgor normal  Neurologic: Grossly normal    Assessment/Plan:     1. Chronic pain of both knees  - weight loss  - referral to ortho, consider physical therapy  - tylenol arthritis 1-2 tabs 3x/day, no more than max 6 tabs total a day  - REFERRAL TO ORTHOPEDICS    2. Bruit of right carotid artery  - reviewed with patient  - already taking statin, will likely benefit from addition of asa  - DUPLEX CAROTID BILATERAL; Future    3. Benign essential hypertension  - at goal  - cont current meds  - weight loss as above    4. Hypercholesteremia  - cont statin    5. Obesity (BMI 35.0-39.9 without comorbidity)  - has lost 11lb since last visit  - needs to improve diet, exercise as tolerated due to knee pain    6. Prediabetes  - as above  - no meds      Advised her to call back or return to office if symptoms worsen/change/persist.  Discussed expected course/resolution/complications of diagnosis in detail with patient. Medication risks/benefits/costs/interactions/alternatives discussed with patient. She was given an after visit summary which includes diagnoses, current medications, & vitals. She expressed understanding with the diagnosis and plan.     ZOHRA Barajas

## 2019-05-30 ENCOUNTER — HOSPITAL ENCOUNTER (OUTPATIENT)
Dept: VASCULAR SURGERY | Age: 74
Discharge: HOME | End: 2019-05-30
Attending: NURSE PRACTITIONER
Payer: MEDICARE

## 2019-05-30 ENCOUNTER — TELEPHONE (OUTPATIENT)
Dept: INTERNAL MEDICINE CLINIC | Age: 74
End: 2019-05-30

## 2019-05-30 DIAGNOSIS — I65.21 STENOSIS OF RIGHT CAROTID ARTERY: Primary | ICD-10-CM

## 2019-05-30 DIAGNOSIS — R09.89 BRUIT OF RIGHT CAROTID ARTERY: ICD-10-CM

## 2019-05-30 LAB
LEFT CCA DIST DIAS: 17.2 CM/S
LEFT CCA DIST SYS: 100 CM/S
LEFT CCA PROX DIAS: 18.5 CM/S
LEFT CCA PROX SYS: 147.4 CM/S
LEFT ECA DIAS: 8.53 CM/S
LEFT ECA SYS: 145.4 CM/S
LEFT ICA DIST DIAS: 28.4 CM/S
LEFT ICA DIST SYS: 117.7 CM/S
LEFT ICA MID DIAS: 14.5 CM/S
LEFT ICA MID SYS: 139.5 CM/S
LEFT ICA PROX DIAS: 26.4 CM/S
LEFT ICA PROX SYS: 117.7 CM/S
LEFT ICA/CCA SYS: 1.39
LEFT VERTEBRAL DIAS: 9.12 CM/S
LEFT VERTEBRAL SYS: 60.8 CM/S
RIGHT CCA DIST DIAS: 8 CM/S
RIGHT CCA DIST SYS: 66.3 CM/S
RIGHT CCA PROX DIAS: 9.1 CM/S
RIGHT CCA PROX SYS: 74 CM/S
RIGHT ECA DIAS: 17.55 CM/S
RIGHT ECA SYS: 234.8 CM/S
RIGHT ICA DIST DIAS: 17 CM/S
RIGHT ICA DIST SYS: 39 CM/S
RIGHT ICA MID DIAS: 15 CM/S
RIGHT ICA MID SYS: 58 CM/S
RIGHT ICA PROX DIAS: 162 CM/S
RIGHT ICA PROX SYS: 509 CM/S
RIGHT ICA/CCA SYS: 7.7
RIGHT VERTEBRAL DIAS: 11.6 CM/S
RIGHT VERTEBRAL SYS: 65.7 CM/S

## 2019-05-30 PROCEDURE — 93880 EXTRACRANIAL BILAT STUDY: CPT

## 2019-05-30 NOTE — TELEPHONE ENCOUNTER
Received an incoming call from [facility] from Vascular Lab. She states patient was evaluated for bruit of right side. She states there is high grade stenosis, greater than 70% and on the higher side. She states that it not near occulusion but getting that way. She states patient should continue to see a specialist in the area. States this information was given form preliminary report, and report may be back tomorrow or early next week.

## 2019-05-31 PROBLEM — I65.21 STENOSIS OF RIGHT CAROTID ARTERY: Status: ACTIVE | Noted: 2019-05-31

## 2019-05-31 RX ORDER — ASPIRIN 81 MG/1
81 TABLET ORAL DAILY
Qty: 90 TAB | Refills: 3 | Status: SHIPPED | OUTPATIENT
Start: 2019-05-31 | End: 2020-04-07 | Stop reason: SDUPTHER

## 2019-05-31 NOTE — PROGRESS NOTES
Results reviewed with patient 05/31/19.   See telephone conversation  Referral to vascular surgery  Nallely Licona NP

## 2019-05-31 NOTE — TELEPHONE ENCOUNTER
Called patient to discuss results and referral to vascular surgery    Referred to [facility]- Dr. Mitch Lacy or Dr. Destiny Dee. Patient provided with contact information and will call to schedule. She is to let me know if unable to be seen within 4 weeks    Patient already on statin. Instructed to also start daily asa 81mg.   Patient verbalized understanding    Patient verbalized understanding of results and plan    Estiven Garza NP

## 2019-06-21 ENCOUNTER — HOSPITAL ENCOUNTER (OUTPATIENT)
Dept: CT IMAGING | Age: 74
Discharge: HOME | End: 2019-06-21
Attending: SURGERY
Payer: MEDICARE

## 2019-06-21 DIAGNOSIS — I65.23 BILATERAL CAROTID ARTERY OCCLUSION: ICD-10-CM

## 2019-06-21 LAB — CREAT BLD-MCNC: 0.6 MG/DL (ref 0.6–1.3)

## 2019-06-21 PROCEDURE — 74011636320 HC RX REV CODE- 636/320: Performed by: RADIOLOGY

## 2019-06-21 PROCEDURE — 82565 ASSAY OF CREATININE: CPT

## 2019-06-21 PROCEDURE — 70498 CT ANGIOGRAPHY NECK: CPT

## 2019-06-21 PROCEDURE — 70496 CT ANGIOGRAPHY HEAD: CPT

## 2019-06-21 PROCEDURE — 74011000258 HC RX REV CODE- 258: Performed by: RADIOLOGY

## 2019-06-21 RX ORDER — SODIUM CHLORIDE 0.9 % (FLUSH) 0.9 %
10 SYRINGE (ML) INJECTION
Status: COMPLETED | OUTPATIENT
Start: 2019-06-21 | End: 2019-06-21

## 2019-06-21 RX ADMIN — SODIUM CHLORIDE 100 ML: 900 INJECTION, SOLUTION INTRAVENOUS at 16:56

## 2019-06-21 RX ADMIN — Medication 10 ML: at 16:56

## 2019-06-21 RX ADMIN — IOPAMIDOL 100 ML: 755 INJECTION, SOLUTION INTRAVENOUS at 16:56

## 2019-06-27 ENCOUNTER — HOSPITAL ENCOUNTER (OUTPATIENT)
Dept: GENERAL RADIOLOGY | Age: 74
Discharge: HOME | End: 2019-06-27
Attending: SURGERY
Payer: MEDICARE

## 2019-06-27 ENCOUNTER — HOSPITAL ENCOUNTER (OUTPATIENT)
Dept: PREADMISSION TESTING | Age: 74
Discharge: HOME | End: 2019-06-27
Payer: MEDICARE

## 2019-06-27 VITALS
WEIGHT: 233.4 LBS | RESPIRATION RATE: 18 BRPM | DIASTOLIC BLOOD PRESSURE: 74 MMHG | HEART RATE: 71 BPM | HEIGHT: 66 IN | BODY MASS INDEX: 37.51 KG/M2 | SYSTOLIC BLOOD PRESSURE: 152 MMHG | TEMPERATURE: 97.7 F

## 2019-06-27 LAB
ABO + RH BLD: NORMAL
ALBUMIN SERPL-MCNC: 4 G/DL (ref 3.5–5)
ALBUMIN/GLOB SERPL: 1.3 {RATIO} (ref 1.1–2.2)
ALP SERPL-CCNC: 88 U/L (ref 45–117)
ALT SERPL-CCNC: 28 U/L (ref 12–78)
ANION GAP SERPL CALC-SCNC: 6 MMOL/L (ref 5–15)
APTT PPP: 28.1 SEC (ref 22.1–32)
AST SERPL-CCNC: 16 U/L (ref 15–37)
BASOPHILS # BLD: 0.1 K/UL (ref 0–0.1)
BASOPHILS NFR BLD: 1 % (ref 0–1)
BILIRUB SERPL-MCNC: 0.6 MG/DL (ref 0.2–1)
BLOOD GROUP ANTIBODIES SERPL: NORMAL
BUN SERPL-MCNC: 17 MG/DL (ref 6–20)
BUN/CREAT SERPL: 25 (ref 12–20)
CALCIUM SERPL-MCNC: 9.4 MG/DL (ref 8.5–10.1)
CHLORIDE SERPL-SCNC: 108 MMOL/L (ref 97–108)
CO2 SERPL-SCNC: 27 MMOL/L (ref 21–32)
CREAT SERPL-MCNC: 0.68 MG/DL (ref 0.55–1.02)
DIFFERENTIAL METHOD BLD: NORMAL
EOSINOPHIL # BLD: 0.3 K/UL (ref 0–0.4)
EOSINOPHIL NFR BLD: 4 % (ref 0–7)
ERYTHROCYTE [DISTWIDTH] IN BLOOD BY AUTOMATED COUNT: 13.1 % (ref 11.5–14.5)
GLOBULIN SER CALC-MCNC: 3.1 G/DL (ref 2–4)
GLUCOSE SERPL-MCNC: 88 MG/DL (ref 65–100)
HCT VFR BLD AUTO: 41.8 % (ref 35–47)
HGB BLD-MCNC: 13.3 G/DL (ref 11.5–16)
IMM GRANULOCYTES # BLD AUTO: 0 K/UL (ref 0–0.04)
IMM GRANULOCYTES NFR BLD AUTO: 0 % (ref 0–0.5)
INR PPP: 1 (ref 0.9–1.1)
LYMPHOCYTES # BLD: 2.3 K/UL (ref 0.8–3.5)
LYMPHOCYTES NFR BLD: 33 % (ref 12–49)
MCH RBC QN AUTO: 30 PG (ref 26–34)
MCHC RBC AUTO-ENTMCNC: 31.8 G/DL (ref 30–36.5)
MCV RBC AUTO: 94.4 FL (ref 80–99)
MONOCYTES # BLD: 0.6 K/UL (ref 0–1)
MONOCYTES NFR BLD: 9 % (ref 5–13)
NEUTS SEG # BLD: 3.7 K/UL (ref 1.8–8)
NEUTS SEG NFR BLD: 53 % (ref 32–75)
NRBC # BLD: 0 K/UL (ref 0–0.01)
NRBC BLD-RTO: 0 PER 100 WBC
PLATELET # BLD AUTO: 228 K/UL (ref 150–400)
PMV BLD AUTO: 11.4 FL (ref 8.9–12.9)
POTASSIUM SERPL-SCNC: 4 MMOL/L (ref 3.5–5.1)
PROT SERPL-MCNC: 7.1 G/DL (ref 6.4–8.2)
PROTHROMBIN TIME: 10.4 SEC (ref 9–11.1)
RBC # BLD AUTO: 4.43 M/UL (ref 3.8–5.2)
SODIUM SERPL-SCNC: 141 MMOL/L (ref 136–145)
SPECIMEN EXP DATE BLD: NORMAL
THERAPEUTIC RANGE,PTTT: NORMAL SECS (ref 58–77)
WBC # BLD AUTO: 7 K/UL (ref 3.6–11)

## 2019-06-27 PROCEDURE — 80053 COMPREHEN METABOLIC PANEL: CPT

## 2019-06-27 PROCEDURE — 85730 THROMBOPLASTIN TIME PARTIAL: CPT

## 2019-06-27 PROCEDURE — 86900 BLOOD TYPING SEROLOGIC ABO: CPT

## 2019-06-27 PROCEDURE — 85025 COMPLETE CBC W/AUTO DIFF WBC: CPT

## 2019-06-27 PROCEDURE — 85610 PROTHROMBIN TIME: CPT

## 2019-06-27 PROCEDURE — 71046 X-RAY EXAM CHEST 2 VIEWS: CPT

## 2019-06-27 PROCEDURE — 36415 COLL VENOUS BLD VENIPUNCTURE: CPT

## 2019-06-27 PROCEDURE — 93005 ELECTROCARDIOGRAM TRACING: CPT

## 2019-06-27 RX ORDER — IBUPROFEN 200 MG
200 TABLET ORAL 2 TIMES DAILY WITH MEALS
COMMUNITY
End: 2021-06-16

## 2019-06-27 NOTE — PERIOP NOTES
DO NOT EAT OR DRINK ANYTHING AFTER MIDNIGHT, except as instructed THE NIGHT BEFORE SURGERY. PT GIVEN OPPORTUNITY TO ASK ADDITIONAL QUESTIONS. Patient given two bottles CHG soap and instruction sheet, instructions for use reviewed with patient. Patient given surgical site infection information FAQs handout and hand hygiene tips sheet. Pre-operative instructions reviewed and patient verbalizes understanding of instructions. Patient has been given the opportunity to ask additional questions. PATIENT GIVEN WRITTEN INSTRUCTIONS TO GO TO THE IMAGING CENTER/CANCER CENTER [address] [place] [address] [address] TO HAVE CHEST XRAY COMPLETED.

## 2019-06-28 LAB
ATRIAL RATE: 66 BPM
CALCULATED P AXIS, ECG09: 32 DEGREES
CALCULATED R AXIS, ECG10: 20 DEGREES
CALCULATED T AXIS, ECG11: 33 DEGREES
DIAGNOSIS, 93000: NORMAL
P-R INTERVAL, ECG05: 150 MS
Q-T INTERVAL, ECG07: 406 MS
QRS DURATION, ECG06: 94 MS
QTC CALCULATION (BEZET), ECG08: 425 MS
VENTRICULAR RATE, ECG03: 66 BPM

## 2019-06-30 ENCOUNTER — ANESTHESIA EVENT (OUTPATIENT)
Dept: SURGERY | Age: 74
DRG: 039 | End: 2019-06-30
Payer: MEDICARE

## 2019-07-01 ENCOUNTER — HOSPITAL ENCOUNTER (INPATIENT)
Age: 74
LOS: 1 days | Discharge: HOME | DRG: 039 | End: 2019-07-02
Attending: SURGERY | Admitting: SURGERY
Payer: MEDICARE

## 2019-07-01 ENCOUNTER — ANESTHESIA (OUTPATIENT)
Dept: SURGERY | Age: 74
DRG: 039 | End: 2019-07-01
Payer: MEDICARE

## 2019-07-01 PROBLEM — I65.21 CAROTID STENOSIS, ASYMPTOMATIC, RIGHT: Status: ACTIVE | Noted: 2019-07-01

## 2019-07-01 LAB
GLUCOSE BLD STRIP.AUTO-MCNC: 196 MG/DL (ref 65–100)
SERVICE CMNT-IMP: ABNORMAL

## 2019-07-01 PROCEDURE — 77030026438 HC STYL ET INTUB CARD -A: Performed by: NURSE ANESTHETIST, CERTIFIED REGISTERED

## 2019-07-01 PROCEDURE — 76210000000 HC OR PH I REC 2 TO 2.5 HR: Performed by: SURGERY

## 2019-07-01 PROCEDURE — 77030012406 HC DRN WND PENRS BARD -A: Performed by: SURGERY

## 2019-07-01 PROCEDURE — 88304 TISSUE EXAM BY PATHOLOGIST: CPT

## 2019-07-01 PROCEDURE — 77030018824 HC SHNT CAR ARGY COVD -B: Performed by: SURGERY

## 2019-07-01 PROCEDURE — 77030018846 HC SOL IRR STRL H20 ICUM -A: Performed by: SURGERY

## 2019-07-01 PROCEDURE — 74011250636 HC RX REV CODE- 250/636: Performed by: SURGERY

## 2019-07-01 PROCEDURE — 74011250636 HC RX REV CODE- 250/636: Performed by: ANESTHESIOLOGY

## 2019-07-01 PROCEDURE — 77030013567 HC DRN WND RESERV BARD -A: Performed by: SURGERY

## 2019-07-01 PROCEDURE — 77030014008 HC SPNG HEMSTAT J&J -C: Performed by: SURGERY

## 2019-07-01 PROCEDURE — 74011250636 HC RX REV CODE- 250/636: Performed by: NURSE ANESTHETIST, CERTIFIED REGISTERED

## 2019-07-01 PROCEDURE — 77030002916 HC SUT ETHLN J&J -A: Performed by: SURGERY

## 2019-07-01 PROCEDURE — 74011000250 HC RX REV CODE- 250

## 2019-07-01 PROCEDURE — 74011000250 HC RX REV CODE- 250: Performed by: SURGERY

## 2019-07-01 PROCEDURE — 65660000000 HC RM CCU STEPDOWN

## 2019-07-01 PROCEDURE — 03UK0KZ SUPPLEMENT RIGHT INTERNAL CAROTID ARTERY WITH NONAUTOLOGOUS TISSUE SUBSTITUTE, OPEN APPROACH: ICD-10-PCS | Performed by: SURGERY

## 2019-07-01 PROCEDURE — 74011000250 HC RX REV CODE- 250: Performed by: NURSE ANESTHETIST, CERTIFIED REGISTERED

## 2019-07-01 PROCEDURE — 77030020256 HC SOL INJ NACL 0.9%  500ML: Performed by: SURGERY

## 2019-07-01 PROCEDURE — 77030013079 HC BLNKT BAIR HGGR 3M -A: Performed by: ANESTHESIOLOGY

## 2019-07-01 PROCEDURE — C1768 GRAFT, VASCULAR: HCPCS | Performed by: SURGERY

## 2019-07-01 PROCEDURE — 77030005537 HC CATH URETH BARD -A: Performed by: SURGERY

## 2019-07-01 PROCEDURE — 77030002987 HC SUT PROL J&J -B: Performed by: SURGERY

## 2019-07-01 PROCEDURE — 77030002933 HC SUT MCRYL J&J -A: Performed by: SURGERY

## 2019-07-01 PROCEDURE — 74011250636 HC RX REV CODE- 250/636

## 2019-07-01 PROCEDURE — 82962 GLUCOSE BLOOD TEST: CPT

## 2019-07-01 PROCEDURE — 74011250637 HC RX REV CODE- 250/637: Performed by: SURGERY

## 2019-07-01 PROCEDURE — 76060000035 HC ANESTHESIA 2 TO 2.5 HR: Performed by: SURGERY

## 2019-07-01 PROCEDURE — 77030011640 HC PAD GRND REM COVD -A: Performed by: SURGERY

## 2019-07-01 PROCEDURE — 77030039266 HC ADH SKN EXOFIN S2SG -A: Performed by: SURGERY

## 2019-07-01 PROCEDURE — 88311 DECALCIFY TISSUE: CPT

## 2019-07-01 PROCEDURE — 77030032490 HC SLV COMPR SCD KNE COVD -B: Performed by: SURGERY

## 2019-07-01 PROCEDURE — 77030020782 HC GWN BAIR PAWS FLX 3M -B

## 2019-07-01 PROCEDURE — 76010000171 HC OR TIME 2 TO 2.5 HR INTENSV-TIER 1: Performed by: SURGERY

## 2019-07-01 PROCEDURE — 03CK0ZZ EXTIRPATION OF MATTER FROM RIGHT INTERNAL CAROTID ARTERY, OPEN APPROACH: ICD-10-PCS | Performed by: SURGERY

## 2019-07-01 PROCEDURE — 77030008684 HC TU ET CUF COVD -B: Performed by: NURSE ANESTHETIST, CERTIFIED REGISTERED

## 2019-07-01 PROCEDURE — 77030031139 HC SUT VCRL2 J&J -A: Performed by: SURGERY

## 2019-07-01 DEVICE — HEMASHIELD PLATINUM FINESSE ULTRA-THIN KNITTED CARDIOVASCULAR PATCH
Type: IMPLANTABLE DEVICE | Site: NECK | Status: FUNCTIONAL
Brand: HEMASHIELD

## 2019-07-01 RX ORDER — ASPIRIN 81 MG/1
81 TABLET ORAL DAILY
Status: DISCONTINUED | OUTPATIENT
Start: 2019-07-02 | End: 2019-07-02 | Stop reason: HOSPADM

## 2019-07-01 RX ORDER — MIDAZOLAM HYDROCHLORIDE 1 MG/ML
INJECTION, SOLUTION INTRAMUSCULAR; INTRAVENOUS AS NEEDED
Status: DISCONTINUED | OUTPATIENT
Start: 2019-07-01 | End: 2019-07-01 | Stop reason: HOSPADM

## 2019-07-01 RX ORDER — FENTANYL CITRATE 50 UG/ML
INJECTION, SOLUTION INTRAMUSCULAR; INTRAVENOUS
Status: COMPLETED
Start: 2019-07-01 | End: 2019-07-01

## 2019-07-01 RX ORDER — HYDROMORPHONE HYDROCHLORIDE 1 MG/ML
0.2 INJECTION, SOLUTION INTRAMUSCULAR; INTRAVENOUS; SUBCUTANEOUS
Status: DISCONTINUED | OUTPATIENT
Start: 2019-07-01 | End: 2019-07-01 | Stop reason: HOSPADM

## 2019-07-01 RX ORDER — DILTIAZEM HYDROCHLORIDE 5 MG/ML
10 INJECTION INTRAVENOUS ONCE
Status: COMPLETED | OUTPATIENT
Start: 2019-07-01 | End: 2019-07-01

## 2019-07-01 RX ORDER — SUCCINYLCHOLINE CHLORIDE 20 MG/ML
INJECTION INTRAMUSCULAR; INTRAVENOUS AS NEEDED
Status: DISCONTINUED | OUTPATIENT
Start: 2019-07-01 | End: 2019-07-01 | Stop reason: HOSPADM

## 2019-07-01 RX ORDER — HEPARIN SODIUM 1000 [USP'U]/ML
INJECTION, SOLUTION INTRAVENOUS; SUBCUTANEOUS AS NEEDED
Status: DISCONTINUED | OUTPATIENT
Start: 2019-07-01 | End: 2019-07-01 | Stop reason: HOSPADM

## 2019-07-01 RX ORDER — LIDOCAINE HYDROCHLORIDE 10 MG/ML
INJECTION INFILTRATION; PERINEURAL AS NEEDED
Status: DISCONTINUED | OUTPATIENT
Start: 2019-07-01 | End: 2019-07-01 | Stop reason: HOSPADM

## 2019-07-01 RX ORDER — LABETALOL HCL 20 MG/4 ML
5 SYRINGE (ML) INTRAVENOUS ONCE
Status: COMPLETED | OUTPATIENT
Start: 2019-07-01 | End: 2019-07-01

## 2019-07-01 RX ORDER — FENTANYL CITRATE 50 UG/ML
50 INJECTION, SOLUTION INTRAMUSCULAR; INTRAVENOUS AS NEEDED
Status: DISCONTINUED | OUTPATIENT
Start: 2019-07-01 | End: 2019-07-01 | Stop reason: HOSPADM

## 2019-07-01 RX ORDER — ONDANSETRON 2 MG/ML
INJECTION INTRAMUSCULAR; INTRAVENOUS
Status: COMPLETED
Start: 2019-07-01 | End: 2019-07-01

## 2019-07-01 RX ORDER — MIDAZOLAM HYDROCHLORIDE 1 MG/ML
1 INJECTION, SOLUTION INTRAMUSCULAR; INTRAVENOUS AS NEEDED
Status: DISCONTINUED | OUTPATIENT
Start: 2019-07-01 | End: 2019-07-01 | Stop reason: HOSPADM

## 2019-07-01 RX ORDER — SODIUM CHLORIDE 0.9 % (FLUSH) 0.9 %
5-40 SYRINGE (ML) INJECTION EVERY 8 HOURS
Status: DISCONTINUED | OUTPATIENT
Start: 2019-07-01 | End: 2019-07-02 | Stop reason: HOSPADM

## 2019-07-01 RX ORDER — FENTANYL CITRATE 50 UG/ML
INJECTION, SOLUTION INTRAMUSCULAR; INTRAVENOUS AS NEEDED
Status: DISCONTINUED | OUTPATIENT
Start: 2019-07-01 | End: 2019-07-01 | Stop reason: HOSPADM

## 2019-07-01 RX ORDER — DILTIAZEM HYDROCHLORIDE 5 MG/ML
INJECTION INTRAVENOUS
Status: COMPLETED
Start: 2019-07-01 | End: 2019-07-01

## 2019-07-01 RX ORDER — NALOXONE HYDROCHLORIDE 0.4 MG/ML
0.4 INJECTION, SOLUTION INTRAMUSCULAR; INTRAVENOUS; SUBCUTANEOUS AS NEEDED
Status: DISCONTINUED | OUTPATIENT
Start: 2019-07-01 | End: 2019-07-02 | Stop reason: HOSPADM

## 2019-07-01 RX ORDER — MIDAZOLAM HYDROCHLORIDE 1 MG/ML
0.5 INJECTION, SOLUTION INTRAMUSCULAR; INTRAVENOUS
Status: DISCONTINUED | OUTPATIENT
Start: 2019-07-01 | End: 2019-07-01 | Stop reason: HOSPADM

## 2019-07-01 RX ORDER — ONDANSETRON 2 MG/ML
INJECTION INTRAMUSCULAR; INTRAVENOUS AS NEEDED
Status: DISCONTINUED | OUTPATIENT
Start: 2019-07-01 | End: 2019-07-01 | Stop reason: HOSPADM

## 2019-07-01 RX ORDER — SODIUM CHLORIDE 0.9 % (FLUSH) 0.9 %
5-40 SYRINGE (ML) INJECTION AS NEEDED
Status: DISCONTINUED | OUTPATIENT
Start: 2019-07-01 | End: 2019-07-01 | Stop reason: HOSPADM

## 2019-07-01 RX ORDER — ESMOLOL HYDROCHLORIDE 10 MG/ML
INJECTION INTRAVENOUS AS NEEDED
Status: DISCONTINUED | OUTPATIENT
Start: 2019-07-01 | End: 2019-07-01 | Stop reason: HOSPADM

## 2019-07-01 RX ORDER — LABETALOL HCL 20 MG/4 ML
SYRINGE (ML) INTRAVENOUS
Status: COMPLETED
Start: 2019-07-01 | End: 2019-07-01

## 2019-07-01 RX ORDER — SODIUM CHLORIDE 0.9 % (FLUSH) 0.9 %
5-40 SYRINGE (ML) INJECTION EVERY 8 HOURS
Status: DISCONTINUED | OUTPATIENT
Start: 2019-07-01 | End: 2019-07-01 | Stop reason: HOSPADM

## 2019-07-01 RX ORDER — AMLODIPINE BESYLATE 5 MG/1
5 TABLET ORAL
Status: DISCONTINUED | OUTPATIENT
Start: 2019-07-01 | End: 2019-07-02 | Stop reason: HOSPADM

## 2019-07-01 RX ORDER — ONDANSETRON 2 MG/ML
4 INJECTION INTRAMUSCULAR; INTRAVENOUS ONCE
Status: COMPLETED | OUTPATIENT
Start: 2019-07-01 | End: 2019-07-01

## 2019-07-01 RX ORDER — MORPHINE SULFATE 2 MG/ML
INJECTION, SOLUTION INTRAMUSCULAR; INTRAVENOUS AS NEEDED
Status: DISCONTINUED | OUTPATIENT
Start: 2019-07-01 | End: 2019-07-01 | Stop reason: HOSPADM

## 2019-07-01 RX ORDER — PROPOFOL 10 MG/ML
INJECTION, EMULSION INTRAVENOUS AS NEEDED
Status: DISCONTINUED | OUTPATIENT
Start: 2019-07-01 | End: 2019-07-01 | Stop reason: HOSPADM

## 2019-07-01 RX ORDER — ACETAMINOPHEN 325 MG/1
650 TABLET ORAL
Status: DISCONTINUED | OUTPATIENT
Start: 2019-07-01 | End: 2019-07-02 | Stop reason: HOSPADM

## 2019-07-01 RX ORDER — FENTANYL CITRATE 50 UG/ML
25 INJECTION, SOLUTION INTRAMUSCULAR; INTRAVENOUS
Status: DISCONTINUED | OUTPATIENT
Start: 2019-07-01 | End: 2019-07-01 | Stop reason: HOSPADM

## 2019-07-01 RX ORDER — ONDANSETRON 2 MG/ML
4 INJECTION INTRAMUSCULAR; INTRAVENOUS
Status: DISCONTINUED | OUTPATIENT
Start: 2019-07-01 | End: 2019-07-02 | Stop reason: HOSPADM

## 2019-07-01 RX ORDER — PHENYLEPHRINE HCL IN 0.9% NACL 0.4MG/10ML
SYRINGE (ML) INTRAVENOUS AS NEEDED
Status: DISCONTINUED | OUTPATIENT
Start: 2019-07-01 | End: 2019-07-01 | Stop reason: HOSPADM

## 2019-07-01 RX ORDER — DEXAMETHASONE SODIUM PHOSPHATE 4 MG/ML
INJECTION, SOLUTION INTRA-ARTICULAR; INTRALESIONAL; INTRAMUSCULAR; INTRAVENOUS; SOFT TISSUE AS NEEDED
Status: DISCONTINUED | OUTPATIENT
Start: 2019-07-01 | End: 2019-07-01 | Stop reason: HOSPADM

## 2019-07-01 RX ORDER — HYDROMORPHONE HYDROCHLORIDE 1 MG/ML
0.5 INJECTION, SOLUTION INTRAMUSCULAR; INTRAVENOUS; SUBCUTANEOUS
Status: DISCONTINUED | OUTPATIENT
Start: 2019-07-01 | End: 2019-07-02 | Stop reason: HOSPADM

## 2019-07-01 RX ORDER — PROTAMINE SULFATE 10 MG/ML
INJECTION, SOLUTION INTRAVENOUS AS NEEDED
Status: DISCONTINUED | OUTPATIENT
Start: 2019-07-01 | End: 2019-07-01 | Stop reason: HOSPADM

## 2019-07-01 RX ORDER — SODIUM CHLORIDE, SODIUM LACTATE, POTASSIUM CHLORIDE, CALCIUM CHLORIDE 600; 310; 30; 20 MG/100ML; MG/100ML; MG/100ML; MG/100ML
100 INJECTION, SOLUTION INTRAVENOUS CONTINUOUS
Status: DISCONTINUED | OUTPATIENT
Start: 2019-07-01 | End: 2019-07-01 | Stop reason: HOSPADM

## 2019-07-01 RX ORDER — DEXTROSE, SODIUM CHLORIDE, AND POTASSIUM CHLORIDE 5; .45; .15 G/100ML; G/100ML; G/100ML
75 INJECTION INTRAVENOUS CONTINUOUS
Status: DISCONTINUED | OUTPATIENT
Start: 2019-07-01 | End: 2019-07-02 | Stop reason: HOSPADM

## 2019-07-01 RX ORDER — LOSARTAN POTASSIUM 50 MG/1
100 TABLET ORAL
Status: DISCONTINUED | OUTPATIENT
Start: 2019-07-01 | End: 2019-07-02 | Stop reason: HOSPADM

## 2019-07-01 RX ORDER — ACETAMINOPHEN 650 MG/1
650 SUPPOSITORY RECTAL
Status: DISCONTINUED | OUTPATIENT
Start: 2019-07-01 | End: 2019-07-02 | Stop reason: HOSPADM

## 2019-07-01 RX ORDER — ROCURONIUM BROMIDE 10 MG/ML
INJECTION, SOLUTION INTRAVENOUS AS NEEDED
Status: DISCONTINUED | OUTPATIENT
Start: 2019-07-01 | End: 2019-07-01 | Stop reason: HOSPADM

## 2019-07-01 RX ORDER — LIDOCAINE HYDROCHLORIDE 20 MG/ML
INJECTION, SOLUTION EPIDURAL; INFILTRATION; INTRACAUDAL; PERINEURAL AS NEEDED
Status: DISCONTINUED | OUTPATIENT
Start: 2019-07-01 | End: 2019-07-01 | Stop reason: HOSPADM

## 2019-07-01 RX ORDER — DIPHENHYDRAMINE HYDROCHLORIDE 50 MG/ML
12.5 INJECTION, SOLUTION INTRAMUSCULAR; INTRAVENOUS AS NEEDED
Status: DISCONTINUED | OUTPATIENT
Start: 2019-07-01 | End: 2019-07-01 | Stop reason: HOSPADM

## 2019-07-01 RX ORDER — ROPIVACAINE HYDROCHLORIDE 5 MG/ML
150 INJECTION, SOLUTION EPIDURAL; INFILTRATION; PERINEURAL AS NEEDED
Status: DISCONTINUED | OUTPATIENT
Start: 2019-07-01 | End: 2019-07-01 | Stop reason: HOSPADM

## 2019-07-01 RX ORDER — ATORVASTATIN CALCIUM 40 MG/1
40 TABLET, FILM COATED ORAL
Status: DISCONTINUED | OUTPATIENT
Start: 2019-07-01 | End: 2019-07-02 | Stop reason: HOSPADM

## 2019-07-01 RX ORDER — SODIUM CHLORIDE 0.9 % (FLUSH) 0.9 %
5-40 SYRINGE (ML) INJECTION AS NEEDED
Status: DISCONTINUED | OUTPATIENT
Start: 2019-07-01 | End: 2019-07-02 | Stop reason: HOSPADM

## 2019-07-01 RX ORDER — LIDOCAINE HYDROCHLORIDE 10 MG/ML
0.1 INJECTION, SOLUTION EPIDURAL; INFILTRATION; INTRACAUDAL; PERINEURAL AS NEEDED
Status: DISCONTINUED | OUTPATIENT
Start: 2019-07-01 | End: 2019-07-01 | Stop reason: HOSPADM

## 2019-07-01 RX ORDER — OXYCODONE HYDROCHLORIDE 5 MG/1
5 TABLET ORAL
Status: DISCONTINUED | OUTPATIENT
Start: 2019-07-01 | End: 2019-07-02 | Stop reason: HOSPADM

## 2019-07-01 RX ORDER — CEFAZOLIN SODIUM/WATER 2 G/20 ML
2 SYRINGE (ML) INTRAVENOUS
Status: COMPLETED | OUTPATIENT
Start: 2019-07-01 | End: 2019-07-01

## 2019-07-01 RX ADMIN — FENTANYL CITRATE 50 MCG: 50 INJECTION, SOLUTION INTRAMUSCULAR; INTRAVENOUS at 07:49

## 2019-07-01 RX ADMIN — DILTIAZEM HYDROCHLORIDE 10 MG: 5 INJECTION INTRAVENOUS at 11:00

## 2019-07-01 RX ADMIN — Medication 5 MG: at 13:58

## 2019-07-01 RX ADMIN — Medication 2 G: at 07:46

## 2019-07-01 RX ADMIN — FENTANYL CITRATE 25 MCG: 50 INJECTION, SOLUTION INTRAMUSCULAR; INTRAVENOUS at 10:22

## 2019-07-01 RX ADMIN — PROPOFOL 30 MG: 10 INJECTION, EMULSION INTRAVENOUS at 09:01

## 2019-07-01 RX ADMIN — ONDANSETRON 4 MG: 2 INJECTION INTRAMUSCULAR; INTRAVENOUS at 10:47

## 2019-07-01 RX ADMIN — DEXAMETHASONE SODIUM PHOSPHATE 4 MG: 4 INJECTION, SOLUTION INTRA-ARTICULAR; INTRALESIONAL; INTRAMUSCULAR; INTRAVENOUS; SOFT TISSUE at 07:55

## 2019-07-01 RX ADMIN — PROTAMINE SULFATE 10 MG: 10 INJECTION, SOLUTION INTRAVENOUS at 09:13

## 2019-07-01 RX ADMIN — PROPOFOL 50 MG: 10 INJECTION, EMULSION INTRAVENOUS at 08:49

## 2019-07-01 RX ADMIN — ROCURONIUM BROMIDE 20 MG: 10 INJECTION, SOLUTION INTRAVENOUS at 08:28

## 2019-07-01 RX ADMIN — FENTANYL CITRATE 50 MCG: 50 INJECTION, SOLUTION INTRAMUSCULAR; INTRAVENOUS at 07:41

## 2019-07-01 RX ADMIN — PROPOFOL 20 MG: 10 INJECTION, EMULSION INTRAVENOUS at 09:00

## 2019-07-01 RX ADMIN — ESMOLOL HYDROCHLORIDE 10 MG: 10 INJECTION INTRAVENOUS at 08:29

## 2019-07-01 RX ADMIN — PROPOFOL 100 MG: 10 INJECTION, EMULSION INTRAVENOUS at 07:41

## 2019-07-01 RX ADMIN — MORPHINE SULFATE 2 MG: 2 INJECTION, SOLUTION INTRAMUSCULAR; INTRAVENOUS at 08:51

## 2019-07-01 RX ADMIN — SUCCINYLCHOLINE CHLORIDE 140 MG: 20 INJECTION INTRAMUSCULAR; INTRAVENOUS at 07:41

## 2019-07-01 RX ADMIN — PROPOFOL 80 MG: 10 INJECTION, EMULSION INTRAVENOUS at 08:28

## 2019-07-01 RX ADMIN — MORPHINE SULFATE 2 MG: 2 INJECTION, SOLUTION INTRAMUSCULAR; INTRAVENOUS at 09:25

## 2019-07-01 RX ADMIN — AMLODIPINE BESYLATE 5 MG: 5 TABLET ORAL at 21:30

## 2019-07-01 RX ADMIN — ROCURONIUM BROMIDE 10 MG: 10 INJECTION, SOLUTION INTRAVENOUS at 07:54

## 2019-07-01 RX ADMIN — SODIUM CHLORIDE, SODIUM LACTATE, POTASSIUM CHLORIDE, AND CALCIUM CHLORIDE 100 ML/HR: 600; 310; 30; 20 INJECTION, SOLUTION INTRAVENOUS at 06:54

## 2019-07-01 RX ADMIN — LIDOCAINE HYDROCHLORIDE 100 MG: 20 INJECTION, SOLUTION EPIDURAL; INFILTRATION; INTRACAUDAL; PERINEURAL at 09:26

## 2019-07-01 RX ADMIN — ATORVASTATIN CALCIUM 40 MG: 40 TABLET, FILM COATED ORAL at 21:30

## 2019-07-01 RX ADMIN — PROPOFOL 20 MG: 10 INJECTION, EMULSION INTRAVENOUS at 08:11

## 2019-07-01 RX ADMIN — ACETAMINOPHEN 650 MG: 325 TABLET ORAL at 17:49

## 2019-07-01 RX ADMIN — SODIUM CHLORIDE 5 MG/HR: 900 INJECTION, SOLUTION INTRAVENOUS at 08:13

## 2019-07-01 RX ADMIN — LABETALOL 20 MG/4 ML (5 MG/ML) INTRAVENOUS SYRINGE 5 MG: at 14:41

## 2019-07-01 RX ADMIN — LOSARTAN POTASSIUM 100 MG: 50 TABLET ORAL at 21:30

## 2019-07-01 RX ADMIN — LIDOCAINE HYDROCHLORIDE 100 MG: 20 INJECTION, SOLUTION EPIDURAL; INFILTRATION; INTRACAUDAL; PERINEURAL at 07:41

## 2019-07-01 RX ADMIN — ONDANSETRON 4 MG: 2 INJECTION INTRAMUSCULAR; INTRAVENOUS at 14:39

## 2019-07-01 RX ADMIN — MIDAZOLAM HYDROCHLORIDE 2 MG: 1 INJECTION, SOLUTION INTRAMUSCULAR; INTRAVENOUS at 07:20

## 2019-07-01 RX ADMIN — HEPARIN SODIUM 2500 UNITS: 1000 INJECTION, SOLUTION INTRAVENOUS; SUBCUTANEOUS at 08:54

## 2019-07-01 RX ADMIN — PROPOFOL 50 MG: 10 INJECTION, EMULSION INTRAVENOUS at 09:24

## 2019-07-01 RX ADMIN — PROTAMINE SULFATE 30 MG: 10 INJECTION, SOLUTION INTRAVENOUS at 09:06

## 2019-07-01 RX ADMIN — ROCURONIUM BROMIDE 20 MG: 10 INJECTION, SOLUTION INTRAVENOUS at 07:41

## 2019-07-01 RX ADMIN — HEPARIN SODIUM 7500 UNITS: 1000 INJECTION, SOLUTION INTRAVENOUS; SUBCUTANEOUS at 08:12

## 2019-07-01 RX ADMIN — MORPHINE SULFATE 2 MG: 2 INJECTION, SOLUTION INTRAMUSCULAR; INTRAVENOUS at 09:24

## 2019-07-01 RX ADMIN — Medication 10 ML: at 21:31

## 2019-07-01 RX ADMIN — PROPOFOL 25 MG: 10 INJECTION, EMULSION INTRAVENOUS at 09:08

## 2019-07-01 RX ADMIN — ESMOLOL HYDROCHLORIDE 20 MG: 10 INJECTION INTRAVENOUS at 07:49

## 2019-07-01 RX ADMIN — Medication 80 MCG: at 07:54

## 2019-07-01 RX ADMIN — Medication 40 MCG: at 07:59

## 2019-07-01 RX ADMIN — ONDANSETRON 4 MG: 2 INJECTION INTRAMUSCULAR; INTRAVENOUS at 07:55

## 2019-07-01 RX ADMIN — MORPHINE SULFATE 2 MG: 2 INJECTION, SOLUTION INTRAMUSCULAR; INTRAVENOUS at 08:52

## 2019-07-01 RX ADMIN — PROCHLORPERAZINE EDISYLATE 10 MG: 5 INJECTION INTRAMUSCULAR; INTRAVENOUS at 23:10

## 2019-07-01 RX ADMIN — FENTANYL CITRATE 25 MCG: 50 INJECTION INTRAMUSCULAR; INTRAVENOUS at 10:22

## 2019-07-01 RX ADMIN — SUGAMMADEX 400 MG: 100 INJECTION, SOLUTION INTRAVENOUS at 09:29

## 2019-07-01 RX ADMIN — PROPOFOL 25 MG: 10 INJECTION, EMULSION INTRAVENOUS at 09:22

## 2019-07-01 RX ADMIN — FENTANYL CITRATE 25 MCG: 50 INJECTION, SOLUTION INTRAMUSCULAR; INTRAVENOUS at 13:39

## 2019-07-01 RX ADMIN — PROCHLORPERAZINE EDISYLATE 10 MG: 5 INJECTION INTRAMUSCULAR; INTRAVENOUS at 17:01

## 2019-07-01 RX ADMIN — LABETALOL 20 MG/4 ML (5 MG/ML) INTRAVENOUS SYRINGE 5 MG: at 13:58

## 2019-07-01 RX ADMIN — DEXTROSE MONOHYDRATE, SODIUM CHLORIDE, AND POTASSIUM CHLORIDE 75 ML/HR: 50; 4.5; 1.49 INJECTION, SOLUTION INTRAVENOUS at 12:06

## 2019-07-01 NOTE — PROGRESS NOTES
Vascular  Called for nausea and soaked dressing  On exam she is neurologically intact, neck flat  Dressings have been reinforced and are dry  No more nausea  Will d/c drain in the am and can get out of bed with assistance prn  following

## 2019-07-01 NOTE — PERIOP NOTES
Nih scale done  Pt had episode of nausea at end of test. No emesis  Pt heart increased to 100's and monitor showed  Sinus arrhythmia  Very freq pvc's  Runs of what appears like afib.  Then runs of nsr  Dr Shima Fagan notified and came to bedside orders rec'd  meds given

## 2019-07-01 NOTE — PROGRESS NOTES
Bedside and Verbal shift change report given to Manolo (oncoming nurse) by Mona Toro RN (offgoing nurse). Report included the following information {SBAR REPORTS ADER:[id number]}.

## 2019-07-01 NOTE — PROGRESS NOTES
Problem: Falls - Risk of  Goal: *Absence of Falls  Description  Document Jose Antoniojonathandutch Abdul Fall Risk and appropriate interventions in the flowsheet. Outcome: Progressing Towards Goal     Problem: Pain - Acute  Goal: *Control of acute pain  Outcome: Progressing Towards Goal     Problem: Pressure Injury - Risk of  Goal: *Prevention of pressure injury  Description  Document Gio Scale and appropriate interventions in the flowsheet.   Outcome: Progressing Towards Goal     Problem: Carotid Endarterectomy Pathway: Day of Surgery ([facility])  Goal: Activity/Safety  Outcome: Progressing Towards Goal  Goal: Medications  Outcome: Progressing Towards Goal  Goal: Treatments/Interventions/Procedures  Outcome: Progressing Towards Goal  Goal: *Optimal pain control at patient's stated goal  Outcome: Progressing Towards Goal  Goal: *Tolerating diet  Outcome: Progressing Towards Goal

## 2019-07-01 NOTE — ANESTHESIA POSTPROCEDURE EVALUATION
Procedure(s):  RIGHT CAROTID ARTERY ENDARTERECTOMY. general    Anesthesia Post Evaluation      Multimodal analgesia: multimodal analgesia used between 6 hours prior to anesthesia start to PACU discharge  Patient location during evaluation: bedside  Patient participation: waiting for patient participation  Level of consciousness: awake  Pain management: adequate  Airway patency: patent  Anesthetic complications: no  Cardiovascular status: acceptable  Respiratory status: unassisted  Hydration status: acceptable  Comments: Post-Anesthesia Evaluation and Assessment    I have evaluated the patient and they are ready for PACU discharge. Patient: Fernando Mckeon MRN: [id number]  SSN: [id number]   YOB: 1945  Age: 72 y.o. Sex: female      Cardiovascular Function/Vital Signs  /69   Pulse 71   Temp 36.7 °C (98.1 °F)   Resp 14   Ht 5' 6\" (1.676 m)   Wt 105.7 kg (233 lb)   SpO2 93%   BMI 37.61 kg/m²     Patient is status post General anesthesia for Procedure(s):  RIGHT CAROTID ARTERY ENDARTERECTOMY. Nausea/Vomiting: None    Postoperative hydration reviewed and adequate. Pain:  Pain Scale 1: (P) Numeric (0 - 10) (07/01/19 0944)  Pain Intensity 1: (P) 0 (07/01/19 0944)   Managed    Neurological Status:   Neuro (WDL): Within Defined Limits (07/01/19 0738)  Neuro  LUE Motor Response: (P) Purposeful (07/01/19 0944)  LLE Motor Response: (P) Purposeful (07/01/19 0944)  RUE Motor Response: (P) Purposeful (07/01/19 0944)  RLE Motor Response: (P) Purposeful (07/01/19 0944)   At baseline    Mental Status, Level of Consciousness: Alert and  oriented to person, place, and time    Pulmonary Status:   O2 Device: Nasal cannula (07/01/19 0947)   Adequate oxygenation and airway patent    Complications related to anesthesia: None    Post-anesthesia assessment completed.  No concerns    Signed By: Cory Gutierrez MD    July 1, 2019                   Vitals Value Taken Time   /69 7/1/2019 10:00 AM   Temp 36.7 °C (98.1 °F) 7/1/2019  9:47 AM   Pulse 70 7/1/2019 10:13 AM   Resp 13 7/1/2019 10:13 AM   SpO2 94 % 7/1/2019 10:13 AM   Vitals shown include unvalidated device data.

## 2019-07-01 NOTE — OP NOTES
[address]   OPERATIVE REPORT    Name:  Laurence Bailey  MR#:  [id number]  :  1945  ACCOUNT #:  [de-identified]  DATE OF SERVICE:  2019    PREOPERATIVE DIAGNOSIS:  Critical right carotid stenosis, asymptomatic on the right. POSTOPERATIVE DIAGNOSIS:  Critical right carotid stenosis, asymptomatic on the right. PROCEDURE PERFORMED:  Right carotid endarterectomy with Dacron patch angioplasty. SURGEON:  Yasmeen Thomas MD    ASSISTANT:  Radha Urena. ANESTHESIA:  General endotracheal anesthesia. COMPLICATIONS:  No complications. SPECIMENS REMOVED:  Right carotid bifurcation plaque. IMPLANTS:  Hemashield Dacron patch. ESTIMATED BLOOD LOSS:  *75mls**. ANESTHESIOLOGIST:  Jay Forte MD    DRAINS:  Small Rivera-Ta. INDICATIONS:  A 72-year-old woman found to have a critical right carotid stenosis. It was opted to proceed with carotid endarterectomy following a CTA which showed intracranial circulation and greater than 95% right carotid stenosis. PROCEDURE:  After informed consent, she was taken to the operating room, was prepped and draped under general endotracheal anesthesia. Thorough time-out was done to identify the side and multiple other factors. An incision was then made parallel and medial to the sternocleidomastoid through the skin and platysma. Group of nest of veins in the base of the neck which may have been related to her thyroid goiter accounted for most of the blood loss with suture ligation. The common, internal, and external carotid arteries were dissected free. Care was taken to identify and avoid entry to the vagal and hypoglossal nerves. The patient was given a total of 10,000 units through the procedure. The original 7500 units showed some clot so we gave another 2500. A control of the common and external within the umbilical tape and Anil choker was performed.   The internal above the stenosis was controlled with double-loop small vessel loop. Four minutes after anticoagulation, flow was interrupted. An arteriotomy was made in the common, carried high into the internal carotid artery. A #10 Pony shunt was placed initially into the common and allowed to bleed and then into the internal.  Shunt patency was confirmed with Doppler. A standard endarterectomy of the common, eversion endarterectomy of the external, and endarterectomy of the internal carotid artery with an excellent endpoint were obtained. All floating debris was removed. Hemashield Dacron patch was then sutured circumferentially with a 6-0 BV-1 Prolene. With 3 additional throws remaining, the shunt was removed. There was good backbleeding from the internal.  The artery was copiously flushed with heparin saline. The closure was then completed. Flow was restored to the external carotid artery initially and then to the internal.  Doppler signals were normal.  Hemodynamics were normal.  One additional suture was required on the medial aspect of the patch in the internal with a 6-0 Prolene. Hemostasis was obtained with 40 mg of protamine sulfate and Fibrillar. A small incision was made inferiorly and small Rivera-Ta was introduced. There was some bleeding from that site, required extension of the incision about 1 inch. The areas of *venous bleeding** were then suture ligated with 3-0 Vicryl. The deep structures were closed with interrupted 3-0 Vicryl. The platysma was then closed over the drain with running 3-0 Vicryl, the skin with subcuticular Monocryl, Dermabond and a small dressing was applied. Suture for the drain was then placed. The patient tolerated the procedure well and was taken to the recovery room in satisfactory condition.         Apolonia Miller MD      [id number]  D:  07/01/2019 9:27  T:  07/01/2019 14:00  JOB #:  [id number]  CC:  Azeem Paez NP

## 2019-07-01 NOTE — ANESTHESIA PROCEDURE NOTES
Arterial Line Placement    Performed by: Pankaj Garland MD  Authorized by: Pankaj Garland MD     Pre-Procedure  Preanesthetic Checklist: patient identified, risks and benefits discussed, anesthesia consent, site marked, patient being monitored, timeout performed and patient being monitored      Procedure:   Prep:  Chlorhexidine  Seldinger Technique?: Yes    Orientation:  Left  Location:  Radial artery  Catheter size:  20 G    Assessment:   Post-procedure:  Line secured and sterile dressing applied  Patient Tolerance:  Patient tolerated the procedure well with no immediate complications

## 2019-07-01 NOTE — PERIOP NOTES
Pt family updated re:surgery start  1000ml sterile water to sterile field  Patient: Rose Mary Zhang MRN: [id number]  SSN: [id number]   YOB: 1945  Age: 72 y.o. Sex: female     Patient is status post Procedure(s):  RIGHT CAROTID ARTERY ENDARTERECTOMY. Surgeon(s) and Role:     * Sharri Lew MD - Primary    Local/Dose/Irrigation:  See mar                  Peripheral IV 07/01/19 Left Hand (Active)      Arterial Line 07/01/19 Left Radial artery (Active)        Rivera-Ta Drain 07/01/19 Right Neck (Active)                     Dressing/Packing:  Wound Neck Right-Dressing Type: Topical skin adhesive/glue (07/01/19 1967)    Splint/Cast:  ]    Other:       .

## 2019-07-01 NOTE — BRIEF OP NOTE
BRIEF OPERATIVE NOTE    Date of Procedure: 7/1/2019   Preoperative Diagnosis: RIGHT CAROTID STENOSIS  Postoperative Diagnosis: RIGHT CAROTID STENOSIS    Procedure(s):  RIGHT CAROTID ARTERY ENDARTERECTOMY WITH DACRON PATCH ANGIOPLASTY  Surgeon(s) and Role:     * Radha Alfaro MD - Primary         Surgical Assistant: **Darling Garg*    Surgical Staff:  Circ-1: Shira Rice RN  Circ-Relief: Mere Madrid RN  Scrub Tech-1: Birdia Munjose maria  Surg Asst-1: Venida [place]  Event Time In Time Out   Incision Start 0800    Incision Close       Anesthesia: General   Estimated Blood Loss: *75mls**  Specimens:   ID Type Source Tests Collected by Time Destination   1 : right carotid plaque Fresh Other                  Radha Alfaro MD 7/1/2019 9318 Pathology      Findings: *preocclusive stenosis**   Complications: *none; small MAGGI drain**  Implants:   Implant Name Type Inv.  Item Serial No.  Lot No. LRB No. Used Action   PATCH VASC PLAT FINESSE [id number] --  - [id number]  PATCH VASC PLAT FINESSE [id number] --  [id number] [employer] MAKRIS CARDIOVASCLR [id number] Right 1 Implanted

## 2019-07-01 NOTE — ANESTHESIA PREPROCEDURE EVALUATION
Relevant Problems   No relevant active problems       Anesthetic History     PONV          Review of Systems / Medical History  Patient summary reviewed, nursing notes reviewed and pertinent labs reviewed    Pulmonary  Within defined limits                 Neuro/Psych   Within defined limits           Cardiovascular    Hypertension        Dysrhythmias            GI/Hepatic/Renal  Within defined limits              Endo/Other        Morbid obesity and arthritis     Other Findings              Physical Exam    Airway  Mallampati: II  TM Distance: > 6 cm  Neck ROM: normal range of motion   Mouth opening: Normal     Cardiovascular  Regular rate and rhythm,  S1 and S2 normal,  no murmur, click, rub, or gallop             Dental  No notable dental hx       Pulmonary  Breath sounds clear to auscultation               Abdominal  GI exam deferred       Other Findings            Anesthetic Plan    ASA: 3  Anesthesia type: general    Monitoring Plan: Arterial line      Induction: Intravenous  Anesthetic plan and risks discussed with: Patient

## 2019-07-02 VITALS
RESPIRATION RATE: 15 BRPM | DIASTOLIC BLOOD PRESSURE: 46 MMHG | BODY MASS INDEX: 38.23 KG/M2 | SYSTOLIC BLOOD PRESSURE: 140 MMHG | WEIGHT: 237.88 LBS | TEMPERATURE: 97.5 F | OXYGEN SATURATION: 96 % | HEIGHT: 66 IN | HEART RATE: 78 BPM

## 2019-07-02 LAB
ANION GAP SERPL CALC-SCNC: 7 MMOL/L (ref 5–15)
BUN SERPL-MCNC: 11 MG/DL (ref 6–20)
BUN/CREAT SERPL: 18 (ref 12–20)
CALCIUM SERPL-MCNC: 8.9 MG/DL (ref 8.5–10.1)
CHLORIDE SERPL-SCNC: 109 MMOL/L (ref 97–108)
CO2 SERPL-SCNC: 26 MMOL/L (ref 21–32)
CREAT SERPL-MCNC: 0.62 MG/DL (ref 0.55–1.02)
ERYTHROCYTE [DISTWIDTH] IN BLOOD BY AUTOMATED COUNT: 13.2 % (ref 11.5–14.5)
GLUCOSE SERPL-MCNC: 127 MG/DL (ref 65–100)
HCT VFR BLD AUTO: 36.6 % (ref 35–47)
HGB BLD-MCNC: 12 G/DL (ref 11.5–16)
MCH RBC QN AUTO: 30.2 PG (ref 26–34)
MCHC RBC AUTO-ENTMCNC: 32.8 G/DL (ref 30–36.5)
MCV RBC AUTO: 92.2 FL (ref 80–99)
NRBC # BLD: 0 K/UL (ref 0–0.01)
NRBC BLD-RTO: 0 PER 100 WBC
PLATELET # BLD AUTO: 221 K/UL (ref 150–400)
PMV BLD AUTO: 10.9 FL (ref 8.9–12.9)
POTASSIUM SERPL-SCNC: 3.8 MMOL/L (ref 3.5–5.1)
RBC # BLD AUTO: 3.97 M/UL (ref 3.8–5.2)
SODIUM SERPL-SCNC: 142 MMOL/L (ref 136–145)
WBC # BLD AUTO: 11.3 K/UL (ref 3.6–11)

## 2019-07-02 PROCEDURE — 80048 BASIC METABOLIC PNL TOTAL CA: CPT

## 2019-07-02 PROCEDURE — 85027 COMPLETE CBC AUTOMATED: CPT

## 2019-07-02 PROCEDURE — 74011250636 HC RX REV CODE- 250/636: Performed by: SURGERY

## 2019-07-02 PROCEDURE — 36415 COLL VENOUS BLD VENIPUNCTURE: CPT

## 2019-07-02 PROCEDURE — 74011250637 HC RX REV CODE- 250/637: Performed by: SURGERY

## 2019-07-02 RX ADMIN — ASPIRIN 81 MG: 81 TABLET ORAL at 09:33

## 2019-07-02 RX ADMIN — ACETAMINOPHEN 650 MG: 325 TABLET ORAL at 09:33

## 2019-07-02 RX ADMIN — Medication 10 ML: at 06:21

## 2019-07-02 RX ADMIN — DEXTROSE MONOHYDRATE, SODIUM CHLORIDE, AND POTASSIUM CHLORIDE 75 ML/HR: 50; 4.5; 1.49 INJECTION, SOLUTION INTRAVENOUS at 03:00

## 2019-07-02 NOTE — DISCHARGE INSTRUCTIONS
Patient Education        Carotid Endarterectomy: What to Expect at Home  Your Recovery  A carotid endarterectomy (say \"mango-RAW-stevie johncb-vhb-nrf-ESTEFANY-hung-asim\") is surgery to remove fatty build-up (plaque) from one of the carotid arteries. There are two carotid arteries--one on each side of the neck--that supply blood to the brain. When plaque builds up in either artery, it can make it hard for blood to flow to the brain. This surgery may lower your risk of having a stroke. You may have a sore throat for a few days. You can expect the cut (incision) in your neck to be sore for about a week. The area around the incision may also be swollen and bruised at first. The area in front of the incision may be numb. This usually gets better after 6 to 12 months. Your doctor closed the incision in your neck with stitches. The stitches will be removed 7 to 10 days after surgery, or you may have stitches that dissolve on their own. You may feel more tired than usual for several weeks after surgery. You will probably be able to go back to work or your usual activities in 1 to 2 weeks. This care sheet gives you a general idea about how long it will take for you to recover. But each person recovers at a different pace. Follow the steps below to feel better as quickly as possible. How can you care for yourself at home? Activity    · Rest when you feel tired. Getting enough sleep will help you recover.     · Try to walk each day. Start by walking a little more than you did the day before. Bit by bit, increase the amount you walk. Walking boosts blood flow and helps prevent pneumonia and constipation.     · Avoid strenuous activities, such as bicycle riding, jogging, weight lifting, or aerobic exercise. Your doctor will tell you when it's okay to do strenuous activity.     · For 1 to 2 weeks, avoid lifting anything that would make you strain.  This may include a child, heavy grocery bags and milk containers, a heavy briefcase or backpack, cat litter or dog food bags, or a vacuum .     · Ask your doctor when you can drive again.     · You will probably need to take 1 to 2 weeks off from work. It depends on the type of work you do and how you feel.     · You may shower and take baths as usual. But do not soak the incision for the first 2 weeks, or until your doctor tells you it is okay. Pat the incision dry.     · Your doctor will tell you when you can have sex again. Diet    · You can eat your normal diet. If your stomach is upset, try bland, low-fat foods like plain rice, broiled chicken, toast, and yogurt.     · Drink plenty of fluids (unless your doctor tells you not to).     · You may notice that your bowel movements are not regular right after your surgery. This is common. Try to avoid constipation and straining with bowel movements. You may want to take a fiber supplement every day. If you have not had a bowel movement after a couple of days, ask your doctor about taking a mild laxative. Medicines    · Your doctor will tell you if and when you can restart your medicines. He or she will also give you instructions about taking any new medicines.     · If you take blood thinners, such as warfarin (Coumadin), clopidogrel (Plavix), or aspirin, be sure to talk to your doctor. He or she will tell you if and when to start taking those medicines again. Make sure that you understand exactly what your doctor wants you to do.     · Your doctor may advise you to take aspirin when you go home. This helps prevent blood clots. Take your medicines exactly as prescribed. Call your doctor if you think you are having a problem with your medicine.     · Be safe with medicines. Take pain medicines exactly as directed. ? If the doctor gave you a prescription medicine for pain, take it as prescribed. ? If you are not taking a prescription pain medicine, ask your doctor if you can take an over-the-counter medicine.   ? Do not take two or more pain medicines at the same time unless the doctor told you to. Many pain medicines have acetaminophen, which is Tylenol. Too much acetaminophen (Tylenol) can be harmful.     · If you think your pain medicine is making you sick to your stomach:  ? Take your medicine after meals (unless your doctor has told you not to). ? Ask your doctor for a different pain medicine.     · If your doctor prescribed antibiotics, take them as directed. Do not stop taking them just because you feel better. You need to take the full course of antibiotics.     · If you take a blood thinner, such as aspirin, be sure you get instructions about how to take your medicine safely. Blood thinners can cause serious bleeding problems. Incision care    · If you have strips of tape over your incision, leave the tape on for a week or until it falls off.     · Wash the area daily with water and pat it dry. Other cleaning products, such as hydrogen peroxide, can make the wound heal more slowly. You may cover the area with a gauze bandage if it weeps or rubs against clothing. Change the bandage every day.     · Keep the area clean and dry. Follow-up care is a key part of your treatment and safety. Be sure to make and go to all appointments, and call your doctor if you are having problems. It's also a good idea to know your test results and keep a list of the medicines you take. When should you call for help? Call 911 anytime you think you may need emergency care. For example, call if:    · You passed out (lost consciousness).     · You have severe trouble breathing.     · You have a tight bulge in your neck on the side where the surgery was done.     · You have symptoms of a stroke. These may include:  ? Sudden numbness, tingling, weakness, or loss of movement in your face, arm, or leg, especially on only one side of your body. ? Sudden vision changes. ? Sudden trouble speaking.   ? Sudden confusion or trouble understanding simple statements. ? Sudden problems with walking or balance. ? A sudden, severe headache that is different from past headaches.     · You have chest pain or pressure. This may occur with:  ? Sweating. ? Shortness of breath. ? Nausea or vomiting. ? Pain that spreads from the chest to the neck, jaw, or one or both shoulders or arms. ? Dizziness or lightheadedness. ? A fast or uneven pulse. After calling 911, chew 1 adult-strength or 2 to 4 low-dose aspirin. Wait for an ambulance. Do not try to drive yourself.    Call your doctor now or seek immediate medical care if:    · You have pain that does not get better after you take pain medicine.     · You have loose stitches, or your incision comes open.     · Bright red blood has soaked through the bandage over your incision.     · You have signs of infection, such as:  ? Increased pain, swelling, warmth, or redness. ? Red streaks leading from the incision. ? Pus draining from the incision. ? A fever.    Watch closely for any changes in your health, and be sure to contact your doctor if you have any problems. Where can you learn more? Go to http://yenifer-juany.info/. Enter F669 in the search box to learn more about \"Carotid Endarterectomy: What to Expect at Home. \"  Current as of: July 22, 2018  Content Version: 11.9  © 8595-5205 [employer]. Care instructions adapted under license by [employer] (which disclaims liability or warranty for this information). If you have questions about a medical condition or this instruction, always ask your healthcare professional. [place] [address] any warranty or liability for your use of this information.

## 2019-07-02 NOTE — DISCHARGE SUMMARY
Ricardo Ibrahim 2906 SUMMARY    Name:  Emil George  MR#:  [id number]  :  1945  ACCOUNT #:  [de-identified]  ADMIT DATE:  2019  DISCHARGE DATE:  2019      ADMITTING DIAGNOSIS:  Critical right carotid stenosis, asymptomatic. PROCEDURE:  Right carotid endarterectomy with Dacron patch. HOSPITAL COURSE:  A 28-year-old woman admitted to have carotid bruit. Duplex followed by CTA showed greater than 95% right carotid stenosis. Her father had had *a previous** stroke and endarterectomy years ago. On the day of admission, she underwent an uncomplicated right carotid endarterectomy with Dacron patch angioplasty. Postoperatively, she had extreme nausea from her anesthesia. She stated that she had had nausea every time, but this was the worst.  Multiple antiemetics were tried, finally her nausea disappeared. Her neck remained flat. Neurologically intact. She was able to tolerate a diet in the morning. Her drain was discontinued. DISCHARGE INSTRUCTIONS:  She was given instructions regarding activity and hygiene. No narcotics were administered or given during the hospital stay because of her nausea. She will use Tylenol and an ice pack. We will see her in 2 weeks.         MD ADRY Sampson/[email or web address]  D:  2019 8:04  T:  2019 9:24  JOB #:  [id number]  CC:  MD Reba Artis NP

## 2019-07-02 NOTE — PROGRESS NOTES
Stroke Education documented in Patient Education: YES  Core Measures Documented in [facility]:  Risk Factors: YES  Warning signs of stroke: YES  When to Activate 911: YES  Medication Education for Risk Factors: YES  Smoking cessation if applicable: YES  Written Education Given:  YES    Discharge NIH Completed: YES  Score: 0    BRAINS: NO    Follow Up Appointment Made: YES  Date/Time if applicable: 4/07/62 at 6:62 a.m.     Bedside RN performed patient education and medication education. Discharge concerns initiated and discussed with patient, including clarification on \"who\" assists the patient at their home and instructions for when the home going patient should call their provider after discharge. Opportunity for questions and clarification was provided. Patient receptive to education: YES  Patient stated: \"I understand when to call the doctor. \"  Barriers to Education: None  Diagnosis Education given:  YES    Length of stay: 1  Expected Day of Discharge: 7/2/19  Ask if they have \"Help at Home\" & add to white board? YES    Education Day #: 1    Medication Education Given:  YES  M in the box Medication name: Lipitor, compazine, zofran    Pt aware of HCAHPS survey: YES    I have reviewed discharge instructions with the patient and spouse. The patient and spouse verbalized understanding. Patient is being discharged to home by family. IV removed. Belongings with patient. Follow up with Dr. Sunny Muro made for 7/18/19. Patient educated on wound care.

## 2019-07-02 NOTE — PROGRESS NOTES
Problem: Falls - Risk of  Goal: *Absence of Falls  Description  Document Rachel Navarro Fall Risk and appropriate interventions in the flowsheet. 7/2/2019 0824 by Isaac Senior RN  Outcome: Progressing Towards Goal  7/1/2019 1841 by Isaac Senior RN  Outcome: Progressing Towards Goal     Problem: Pain - Acute  Goal: *Control of acute pain  7/2/2019 0824 by Isaac Senior RN  Outcome: Progressing Towards Goal  7/1/2019 1841 by Isaac Senior RN  Outcome: Progressing Towards Goal     Problem: Pressure Injury - Risk of  Goal: *Prevention of pressure injury  Description  Document Gio Scale and appropriate interventions in the flowsheet.   7/2/2019 0824 by Isaac Senior RN  Outcome: Progressing Towards Goal  7/1/2019 1841 by Isaac Senior RN  Outcome: Progressing Towards Goal     Problem: Carotid Endarterectomy Pathway: Day of Surgery ([facility])  Goal: Activity/Safety  Outcome: Progressing Towards Goal  Goal: Medications  Outcome: Progressing Towards Goal  Goal: Treatments/Interventions/Procedures  Outcome: Progressing Towards Goal  Goal: *Optimal pain control at patient's stated goal  Outcome: Progressing Towards Goal  Goal: *Tolerating diet  Outcome: Progressing Towards Goal     Problem: Carotid Endarterectomy Pathway: Post-Op Day 1  Goal: Activity/Safety  Outcome: Progressing Towards Goal  Goal: Diagnostic Test/Procedures  Outcome: Progressing Towards Goal  Goal: Discharge Planning  Outcome: Progressing Towards Goal  Goal: Medications  Outcome: Progressing Towards Goal  Goal: Treatments/Interventions/Procedures  Outcome: Progressing Towards Goal

## 2019-07-02 NOTE — PROGRESS NOTES
Hospital follow-up PCP transitional care appointment has been scheduled with NEFTALI Metcalf for Monday, 7/8/19 at 2:20 p.m. Pending patient discharge.   Emili Leon, Care Management Specialist.

## 2019-07-08 ENCOUNTER — OFFICE VISIT (OUTPATIENT)
Dept: INTERNAL MEDICINE CLINIC | Age: 74
End: 2019-07-08

## 2019-07-08 VITALS
HEIGHT: 64 IN | HEART RATE: 76 BPM | OXYGEN SATURATION: 96 % | TEMPERATURE: 98.2 F | DIASTOLIC BLOOD PRESSURE: 66 MMHG | RESPIRATION RATE: 16 BRPM | WEIGHT: 235 LBS | SYSTOLIC BLOOD PRESSURE: 130 MMHG | BODY MASS INDEX: 40.12 KG/M2

## 2019-07-08 DIAGNOSIS — I10 BENIGN ESSENTIAL HYPERTENSION: ICD-10-CM

## 2019-07-08 DIAGNOSIS — Z09 HOSPITAL DISCHARGE FOLLOW-UP: Primary | ICD-10-CM

## 2019-07-08 DIAGNOSIS — I65.21 CAROTID STENOSIS, ASYMPTOMATIC, RIGHT: ICD-10-CM

## 2019-07-08 DIAGNOSIS — H61.21 EXCESSIVE CERUMEN IN EAR CANAL, RIGHT: ICD-10-CM

## 2019-07-08 DIAGNOSIS — E78.00 HYPERCHOLESTEREMIA: ICD-10-CM

## 2019-07-08 NOTE — PROGRESS NOTES
73 yo female in for Hospital Discharge f/u after have Right Carotid Endartarectomy on 7/1/19 by Dr. Skyler Martinez at [facility]. (was done due to finding of Carotid bruit)  She reports being in misery with the post-anesthesia vomiting, but nothing seemed to help. She was discharged home a little more than 24 hrs later. The incision was \"glued together\", and there is some pulling in that area. With Ibuprofen 200 mg BID since May 30 (per Ortho), her R Hip Bursitis. It took a few weeks for resolution of the pain. There has been some crackling in her R ear recently. PE: Obese WF is alert   BP = 130/66   R Neck - 11-12 in vertical surgical wound w/o surrounding redness or drainage   Ears - L canal clear, but TM very dull; R ear canal - excessive dry cerumen    Imp: Hospital F/U    R Carotid Endartarectomy   Cerumen R Ear Canal    Plan: She will stop taking the 200 mg Ibuprofen regularly now that her hip Bursitis has resolved, and use prn    Discussed pathophysiology of vascular disease including diet and importance   She will return for R ear cleaning once neck wound is healed   She will see Dr. Skyler Martinez for f/u as scheduled in a few weeks  ____________________________  Expected course of current diagnosed problem(s) as well as expected progression and possible complications, and desired follow up with provider are discussed with patient. Patient is encouraged to be back in touch with any questions or concerns. Patient expresses understanding of plan of care. Patient is given AVS which includes diagnoses, current medications, vitals.

## 2019-07-11 RX ORDER — LOSARTAN POTASSIUM 100 MG/1
100 TABLET ORAL
Qty: 90 TAB | Refills: 1 | Status: SHIPPED | OUTPATIENT
Start: 2019-07-11 | End: 2020-01-13

## 2019-07-11 RX ORDER — AMLODIPINE BESYLATE 5 MG/1
5 TABLET ORAL
Qty: 90 TAB | Refills: 1 | Status: SHIPPED | OUTPATIENT
Start: 2019-07-11 | End: 2020-01-13

## 2019-07-11 RX ORDER — ATORVASTATIN CALCIUM 40 MG/1
40 TABLET, FILM COATED ORAL
Qty: 90 TAB | Refills: 1 | Status: SHIPPED | OUTPATIENT
Start: 2019-07-11 | End: 2020-01-13

## 2019-12-06 ENCOUNTER — HOSPITAL ENCOUNTER (OUTPATIENT)
Dept: MAMMOGRAPHY | Age: 74
Discharge: HOME | End: 2019-12-06
Attending: NURSE PRACTITIONER
Payer: MEDICARE

## 2019-12-06 ENCOUNTER — OFFICE VISIT (OUTPATIENT)
Dept: INTERNAL MEDICINE CLINIC | Age: 74
End: 2019-12-06

## 2019-12-06 VITALS
WEIGHT: 235 LBS | DIASTOLIC BLOOD PRESSURE: 70 MMHG | OXYGEN SATURATION: 98 % | TEMPERATURE: 98 F | BODY MASS INDEX: 40.12 KG/M2 | HEART RATE: 68 BPM | SYSTOLIC BLOOD PRESSURE: 130 MMHG | RESPIRATION RATE: 16 BRPM | HEIGHT: 64 IN

## 2019-12-06 DIAGNOSIS — E78.5 HYPERLIPIDEMIA, UNSPECIFIED HYPERLIPIDEMIA TYPE: Primary | ICD-10-CM

## 2019-12-06 DIAGNOSIS — Z12.31 VISIT FOR SCREENING MAMMOGRAM: ICD-10-CM

## 2019-12-06 DIAGNOSIS — R68.89 OTHER GENERAL SYMPTOMS AND SIGNS: ICD-10-CM

## 2019-12-06 DIAGNOSIS — Z12.11 COLON CANCER SCREENING: ICD-10-CM

## 2019-12-06 DIAGNOSIS — Z00.00 MEDICARE ANNUAL WELLNESS VISIT, SUBSEQUENT: ICD-10-CM

## 2019-12-06 DIAGNOSIS — Z78.0 POSTMENOPAUSAL STATE: ICD-10-CM

## 2019-12-06 DIAGNOSIS — R53.83 FATIGUE, UNSPECIFIED TYPE: ICD-10-CM

## 2019-12-06 DIAGNOSIS — I10 ESSENTIAL HYPERTENSION: ICD-10-CM

## 2019-12-06 DIAGNOSIS — M89.9 DISORDER OF BONE, UNSPECIFIED: ICD-10-CM

## 2019-12-06 DIAGNOSIS — R73.03 PREDIABETES: ICD-10-CM

## 2019-12-06 PROCEDURE — 77067 SCR MAMMO BI INCL CAD: CPT

## 2019-12-06 NOTE — PROGRESS NOTES
Chief Complaint   Patient presents with   [facility] Wellness Visit     Reviewed record in preparation for visit and have obtained necessary documentation. Identified pt with two pt identifiers(name and ). Health Maintenance Due   Topic    MEDICARE YEARLY EXAM          Chief Complaint   Patient presents with   [facility] Annual Wellness Visit        Wt Readings from Last 3 Encounters:   19 235 lb (106.6 kg)   19 235 lb (106.6 kg)   19 237 lb 14 oz (107.9 kg)     Temp Readings from Last 3 Encounters:   19 98 °F (36.7 °C) (Oral)   19 98.2 °F (36.8 °C) (Oral)   19 97.5 °F (36.4 °C)     BP Readings from Last 3 Encounters:   19 130/70   19 130/66   19 140/46     Pulse Readings from Last 3 Encounters:   19 68   19 76   19 78           Learning Assessment:  :     Learning Assessment 2019 2018 10/28/2015 2014   PRIMARY LEARNER Patient Patient Patient Patient   HIGHEST LEVEL OF EDUCATION - PRIMARY LEARNER  4 YEARS OF COLLEGE 4 YEARS OF COLLEGE 4 YEARS OF COLLEGE 4 YEARS OF COLLEGE   BARRIERS PRIMARY LEARNER NONE NONE NONE NONE   CO-LEARNER CAREGIVER No No No No   PRIMARY LANGUAGE ENGLISH ENGLISH ENGLISH ENGLISH    NEED - - No -   LEARNER PREFERENCE PRIMARY READING PICTURES READING READING     - VIDEOS - -   LEARNING SPECIAL TOPICS - - no -   ANSWERED BY patient patient patient patient   RELATIONSHIP SELF SELF SELF SELF       Depression Screening:  :     3 most recent PHQ Screens 2019   Little interest or pleasure in doing things Not at all   Feeling down, depressed, irritable, or hopeless Not at all   Total Score PHQ 2 0       Fall Risk Assessment:  :     Fall Risk Assessment, last 12 mths 2019   Able to walk? Yes   Fall in past 12 months?  No   Fall with injury? -   Number of falls in past 12 months -   Fall Risk Score -       Abuse Screening:  :     Abuse Screening Questionnaire 2018 10/28/2015 12/30/2014   Do you ever feel afraid of your partner? N N N N N   Are you in a relationship with someone who physically or mentally threatens you? N N N N N   Is it safe for you to go home? Y Y Y Y Y       Coordination of Care Questionnaire:  :     1) Have you been to an emergency room, urgent care clinic since your last visit? no   Hospitalized since your last visit? no             2) Have you seen or consulted any other health care providers outside of [address] [place] [address] since your last visit? no  (Include any pap smears or colon screenings in this section.)    3) Do you have an Advance Directive on file? yes    4) Are you interested in receiving information on Advance Directives? NO      Patient is accompanied by self I have received verbal consent from Viv Og to discuss any/all medical information while they are present in the room. Reviewed record  In preparation for visit and have obtained necessary documentation.

## 2019-12-06 NOTE — PATIENT INSTRUCTIONS
Medicare Wellness Visit, Female The best way to live healthy is to have a lifestyle where you eat a well-balanced diet, exercise regularly, limit alcohol use, and quit all forms of tobacco/nicotine, if applicable. Regular preventive services are another way to keep healthy. Preventive services (vaccines, screening tests, monitoring & exams) can help personalize your care plan, which helps you manage your own care. Screening tests can find health problems at the earliest stages, when they are easiest to treat. [facility] follows the current, evidence-based guidelines published by the [facility] Sean Diaz ([facility]) when recommending preventive services for our patients. Because we follow these guidelines, sometimes recommendations change over time as research supports it. (For example, mammograms used to be recommended annually. Even though Medicare will still pay for an annual mammogram, the newer guidelines recommend a mammogram every two years for women of average risk). Of course, you and your doctor may decide to screen more often for some diseases, based on your risk and your co-morbidities (chronic disease you are already diagnosed with). Preventive services for you include: - Medicare offers their members a free annual wellness visit, which is time for you and your primary care provider to discuss and plan for your preventive service needs. Take advantage of this benefit every year! 
-All adults over the age of 72 should receive the recommended pneumonia vaccines. Current USPSTF guidelines recommend a series of two vaccines for the best pneumonia protection.  
-All adults should have a flu vaccine yearly and a tetanus vaccine every 10 years.  
-All adults age 48 and older should receive the shingles vaccines (series of two vaccines). -All adults age 38-68 who are overweight should have a diabetes screening test once every three years. -All adults born between [birth year] and 1965 should be screened once for Hepatitis C. 
-Other screening tests and preventive services for persons with diabetes include: an eye exam to screen for diabetic retinopathy, a kidney function test, a foot exam, and stricter control over your cholesterol.  
-Cardiovascular screening for adults with routine risk involves an electrocardiogram (ECG) at intervals determined by your doctor.  
-Colorectal cancer screenings should be done for adults age 54-65 with no increased risk factors for colorectal cancer. There are a number of acceptable methods of screening for this type of cancer. Each test has its own benefits and drawbacks. Discuss with your doctor what is most appropriate for you during your annual wellness visit. The different tests include: colonoscopy (considered the best screening method), a fecal occult blood test, a fecal DNA test, and sigmoidoscopy. 
 
-A bone mass density test is recommended when a woman turns 65 to screen for osteoporosis. This test is only recommended one time, as a screening. Some providers will use this same test as a disease monitoring tool if you already have osteoporosis. -Breast cancer screenings are recommended every other year for women of normal risk, age 54-69. 
-Cervical cancer screenings for women over age 72 are only recommended with certain risk factors. Here is a list of your current Health Maintenance items (your personalized list of preventive services) with a due date: 
Health Maintenance Due Topic Date Due  
 Annual Well Visit  11/20/2019

## 2019-12-06 NOTE — PROGRESS NOTES
This is the Subsequent Medicare Annual Wellness Exam, performed 12 months or more after the Initial AWV or the last Subsequent AWV    I have reviewed the patient's medical history in detail and updated the computerized patient record. She reports ongoing fatigue and requests lab testing for vitamin B12. She has follow up with vascular in January. History     Patient Active Problem List   Diagnosis Code    Benign essential hypertension I10    Personal history of colonic polyps Z86.010    Hypercholesteremia E78.00    Obesity (BMI 35.0-39.9 without comorbidity) E66.9    Prediabetes R73.03    Pap smear for cervical cancer screening Z12.4    H/O bone density study Z92.89    Severe obesity ([address] E66.01    Stenosis of right carotid artery I65.21    Carotid stenosis, asymptomatic, right I65.21     Past Medical History:   Diagnosis Date    Arrhythmia     HEART PALPITATIONS OCCASIONALLY    Arthritis     Chronic pain 2016    LEFT KNEE    Colon polyps     hyperplastic    Hypercholesterolemia     Hypertension     Nausea & vomiting       Past Surgical History:   Procedure Laterality Date    ENDOSCOPY, COLON, DIAGNOSTIC  8/2009    HX CAROTID ENDARTERECTOMY Right 07/01/2019    Dr. Ahsan France    HX HEENT      T&A (childhood)    HX HERNIA REPAIR      umbilical    HX ORTHOPAEDIC Left 2016    MENISCUS REPAIR    HX ORTHOPAEDIC Right     JOINT SURGERY ON MIDDLE FINGER TO REMOVE A GROWTH    HX TUBAL LIGATION  1990     Current Outpatient Medications   Medication Sig Dispense Refill    amLODIPine (NORVASC) 5 mg tablet Take 1 Tab by mouth nightly. Indications: high blood pressure 90 Tab 1    atorvastatin (LIPITOR) 40 mg tablet Take 1 Tab by mouth nightly. 90 Tab 1    losartan (COZAAR) 100 mg tablet Take 1 Tab by mouth nightly. 90 Tab 1    aspirin delayed-release 81 mg tablet Take 1 Tab by mouth daily.  90 Tab 3    VIT C/E/ZN/COPPR/LUTEIN/ZEAXAN (PRESERVISION AREDS 2 PO) Take 1 Tab by mouth two (2) times daily (with meals).  multivitamin (ONE A DAY) tablet Take 1 Tab by mouth daily.  ibuprofen (ADVIL) 200 mg tablet Take 200 mg by mouth two (2) times daily (with meals).  acetaminophen (TYLENOL ARTHRITIS PAIN) 650 mg TbER Take 650 mg by mouth every six to eight (6-8) hours as needed.  acetaminophen/diphenhydramine (TYLENOL PM PO) Take  by mouth nightly as needed. Allergies   Allergen Reactions    Ace Inhibitors Cough    Thiazides Photosensitivity       Family History   Problem Relation Age of Onset    Macular Degen Mother     Arthritis-osteo Mother     Osteoporosis Mother     Other Mother         tremor    Cancer Mother         Esophageal    Parkinsonism Father     Stroke Father         CVA (42's)   Karthikeyan Vale Bipolar Disorder Sister     No Known Problems Sister     No Known Problems Daughter     Attention Deficit Hyperactivity Disorder Daughter     No Known Problems Daughter     Anesth Problems Neg Hx      Social History     Tobacco Use    Smoking status: Never Smoker    Smokeless tobacco: Never Used   Substance Use Topics    Alcohol use: Yes     Alcohol/week: 9.0 standard drinks     Types: 4 Shots of liquor, 5 Standard drinks or equivalent per week     Comment: socially       Depression Risk Factor Screening:     3 most recent PHQ Screens 5/20/2019   Little interest or pleasure in doing things Not at all   Feeling down, depressed, irritable, or hopeless Not at all   Total Score PHQ 2 0       Alcohol Risk Factor Screening:   Do you average 1 drink per night or more than 7 drinks a week:  No    On any one occasion in the past three months have you have had more than 3 drinks containing alcohol:  No      Functional Ability and Level of Safety:   Hearing: Hearing is good. Activities of Daily Living: The home contains: no safety equipment. Patient does total self care    Ambulation: with mild difficulty    Fall Risk:  Fall Risk Assessment, last 12 mths 5/20/2019   Able to walk?  Yes   Fall in past 12 months? No   Fall with injury? -   Number of falls in past 12 months -   Fall Risk Score -       Abuse Screen:  Patient is not abused    Cognitive Screening   Has your family/caregiver stated any concerns about your memory: yes - forgetfulness, remember directions  Cognitive Screening: normal    Patient Care Team   Patient Care Team:  Pierre Maldonado NP as PCP - [facility] Empaneled Provider  Leann Art MD (Orthopedic Surgery)    Assessment/Plan   Education and counseling provided:  Are appropriate based on today's review and evaluation    Diagnoses and all orders for this visit:    1. Hyperlipidemia, unspecified hyperlipidemia type  -     LIPID PANEL    2. Prediabetes  -     HEMOGLOBIN A1C WITH EAG    3. Essential hypertension  -     CBC WITH AUTOMATED DIFF  -     METABOLIC PANEL, COMPREHENSIVE    4. Fatigue, unspecified type  -     VITAMIN D, 25 HYDROXY  -     VITAMIN B12    5. Colon cancer screening  -     REFERRAL TO GASTROENTEROLOGY    6. Disorder of bone, unspecified   -     VITAMIN D, 25 HYDROXY    7. Other general symptoms and signs   -     VITAMIN B12    8. Medicare annual wellness visit, subsequent    9. Postmenopausal state  -     DEXA BONE DENSITY STUDY AXIAL;  Future        Health Maintenance Due   Topic Date Due    MEDICARE YEARLY EXAM  11/20/2019

## 2019-12-26 ENCOUNTER — HOSPITAL ENCOUNTER (OUTPATIENT)
Dept: MAMMOGRAPHY | Age: 74
Discharge: HOME | End: 2019-12-26
Attending: NURSE PRACTITIONER
Payer: MEDICARE

## 2019-12-26 ENCOUNTER — HOSPITAL ENCOUNTER (OUTPATIENT)
Dept: LAB | Age: 74
Discharge: HOME | End: 2019-12-26

## 2019-12-26 DIAGNOSIS — Z78.0 POSTMENOPAUSAL STATE: ICD-10-CM

## 2019-12-26 PROCEDURE — 80061 LIPID PANEL: CPT

## 2019-12-26 PROCEDURE — 82607 VITAMIN B-12: CPT

## 2019-12-26 PROCEDURE — 82306 VITAMIN D 25 HYDROXY: CPT

## 2019-12-26 PROCEDURE — 83036 HEMOGLOBIN GLYCOSYLATED A1C: CPT

## 2019-12-26 PROCEDURE — 77080 DXA BONE DENSITY AXIAL: CPT

## 2019-12-26 PROCEDURE — 36415 COLL VENOUS BLD VENIPUNCTURE: CPT

## 2019-12-26 PROCEDURE — 85025 COMPLETE CBC W/AUTO DIFF WBC: CPT

## 2019-12-26 PROCEDURE — 80053 COMPREHEN METABOLIC PANEL: CPT

## 2019-12-27 LAB
25(OH)D3+25(OH)D2 SERPL-MCNC: 27.7 NG/ML (ref 30–100)
ALBUMIN SERPL-MCNC: 4.7 G/DL (ref 3.5–4.8)
ALBUMIN/GLOB SERPL: 1.9 {RATIO} (ref 1.2–2.2)
ALP SERPL-CCNC: 85 IU/L (ref 39–117)
ALT SERPL-CCNC: 20 IU/L (ref 0–32)
AST SERPL-CCNC: 22 IU/L (ref 0–40)
BASOPHILS # BLD AUTO: 0.1 X10E3/UL (ref 0–0.2)
BASOPHILS NFR BLD AUTO: 1 %
BILIRUB SERPL-MCNC: 0.7 MG/DL (ref 0–1.2)
BUN SERPL-MCNC: 18 MG/DL (ref 8–27)
BUN/CREAT SERPL: 25 (ref 12–28)
CALCIUM SERPL-MCNC: 9.9 MG/DL (ref 8.7–10.3)
CHLORIDE SERPL-SCNC: 104 MMOL/L (ref 96–106)
CHOLEST SERPL-MCNC: 163 MG/DL (ref 100–199)
CO2 SERPL-SCNC: 22 MMOL/L (ref 20–29)
CREAT SERPL-MCNC: 0.73 MG/DL (ref 0.57–1)
EOSINOPHIL # BLD AUTO: 0.2 X10E3/UL (ref 0–0.4)
EOSINOPHIL NFR BLD AUTO: 4 %
ERYTHROCYTE [DISTWIDTH] IN BLOOD BY AUTOMATED COUNT: 12.6 % (ref 12.3–15.4)
EST. AVERAGE GLUCOSE BLD GHB EST-MCNC: 123 MG/DL
GLOBULIN SER CALC-MCNC: 2.5 G/DL (ref 1.5–4.5)
GLUCOSE SERPL-MCNC: 104 MG/DL (ref 65–99)
HBA1C MFR BLD: 5.9 % (ref 4.8–5.6)
HCT VFR BLD AUTO: 42.2 % (ref 34–46.6)
HDLC SERPL-MCNC: 52 MG/DL
HGB BLD-MCNC: 13.8 G/DL (ref 11.1–15.9)
IMM GRANULOCYTES # BLD AUTO: 0 X10E3/UL (ref 0–0.1)
IMM GRANULOCYTES NFR BLD AUTO: 1 %
LDLC SERPL CALC-MCNC: 87 MG/DL (ref 0–99)
LYMPHOCYTES # BLD AUTO: 2.4 X10E3/UL (ref 0.7–3.1)
LYMPHOCYTES NFR BLD AUTO: 37 %
MCH RBC QN AUTO: 29.2 PG (ref 26.6–33)
MCHC RBC AUTO-ENTMCNC: 32.7 G/DL (ref 31.5–35.7)
MCV RBC AUTO: 89 FL (ref 79–97)
MONOCYTES # BLD AUTO: 0.4 X10E3/UL (ref 0.1–0.9)
MONOCYTES NFR BLD AUTO: 7 %
NEUTROPHILS # BLD AUTO: 3.3 X10E3/UL (ref 1.4–7)
NEUTROPHILS NFR BLD AUTO: 50 %
PLATELET # BLD AUTO: 252 X10E3/UL (ref 150–450)
POTASSIUM SERPL-SCNC: 4.3 MMOL/L (ref 3.5–5.2)
PROT SERPL-MCNC: 7.2 G/DL (ref 6–8.5)
RBC # BLD AUTO: 4.72 X10E6/UL (ref 3.77–5.28)
SODIUM SERPL-SCNC: 145 MMOL/L (ref 134–144)
TRIGL SERPL-MCNC: 119 MG/DL (ref 0–149)
VIT B12 SERPL-MCNC: 967 PG/ML (ref 232–1245)
VLDLC SERPL CALC-MCNC: 24 MG/DL (ref 5–40)
WBC # BLD AUTO: 6.4 X10E3/UL (ref 3.4–10.8)

## 2020-01-10 ENCOUNTER — TELEPHONE (OUTPATIENT)
Dept: INTERNAL MEDICINE CLINIC | Age: 75
End: 2020-01-10

## 2020-03-27 DIAGNOSIS — E78.00 HYPERCHOLESTEREMIA: ICD-10-CM

## 2020-03-27 DIAGNOSIS — I10 BENIGN ESSENTIAL HYPERTENSION: ICD-10-CM

## 2020-03-30 RX ORDER — LOSARTAN POTASSIUM 100 MG/1
TABLET ORAL
Qty: 90 TAB | Refills: 0 | Status: SHIPPED | OUTPATIENT
Start: 2020-03-30 | End: 2020-06-29

## 2020-03-30 RX ORDER — AMLODIPINE BESYLATE 5 MG/1
TABLET ORAL
Qty: 90 TAB | Refills: 0 | Status: SHIPPED | OUTPATIENT
Start: 2020-03-30 | End: 2020-06-29

## 2020-03-30 RX ORDER — ATORVASTATIN CALCIUM 40 MG/1
TABLET, FILM COATED ORAL
Qty: 90 TAB | Refills: 0 | Status: SHIPPED | OUTPATIENT
Start: 2020-03-30 | End: 2020-06-29

## 2020-04-07 NOTE — TELEPHONE ENCOUNTER
Requested Prescriptions     Pending Prescriptions Disp Refills    aspirin delayed-release 81 mg tablet 90 Tab 3     Sig: Take 1 Tab by mouth daily.        [facility] [id number]  [phone]    Pt has an appt on 4/28/20

## 2020-04-09 RX ORDER — ASPIRIN 81 MG/1
81 TABLET ORAL DAILY
Qty: 90 TAB | Refills: 0 | Status: SHIPPED | OUTPATIENT
Start: 2020-04-09 | End: 2020-07-29

## 2020-04-09 NOTE — TELEPHONE ENCOUNTER
Orders Placed This Encounter    aspirin delayed-release 81 mg tablet     Sig: Take 1 Tab by mouth daily.      Dispense:  90 Tab     Refill:  0     The above orders were approved via [employer]OR[employer] per Jane Stover NP

## 2020-06-17 ENCOUNTER — OFFICE VISIT (OUTPATIENT)
Dept: INTERNAL MEDICINE CLINIC | Age: 75
End: 2020-06-17

## 2020-06-17 DIAGNOSIS — E78.00 HYPERCHOLESTEREMIA: ICD-10-CM

## 2020-06-17 DIAGNOSIS — R73.03 PREDIABETES: ICD-10-CM

## 2020-06-17 DIAGNOSIS — I10 BENIGN ESSENTIAL HYPERTENSION: Primary | ICD-10-CM

## 2020-06-17 NOTE — PROGRESS NOTES
Mara Villalpando is a 69 y.o. female who was seen by synchronous (real-time) audio-video technology on 6/17/2020. Consent: Mara Villalpando who was seen by synchronous (real-time) audio-video technology, and/or her healthcare decision maker, is aware that this patient-initiated, Telehealth encounter on 6/17/2020 is a billable service, with coverage as determined by her insurance carrier. She is aware that she may receive a bill and has provided verbal consent to proceed: Yes. Patient identification was verified prior to start of the visit. A caregiver was present when appropriate. Due to this being a TeleHealth encounter (during [address] emergency), evaluation of the following organ systems was limited: VS/Constituional/EENT/Resp/CV/GI//MS/Neuro/Skin/Heme-Lymph-Imm. Pursuant to the emergency declaration under the [address] [place] [address] waiver authority and the [employer] Act, this Virtual Visit was conducted, with patient's (and/or legal guardian's) consent, to reduce the patient's risk of exposure to COVID-19 and provide necessary medical care. Services were provided through a synchronous discussion virtually to substitute for in-person clinic visit. I was at home. The patient was at home. Assessment & Plan:   Diagnoses and all orders for this visit:    1. Benign essential hypertension    2. Hypercholesteremia    3. Prediabetes      Follow up next week     Subjective:   Mara Villalpando was seen for Establish Care  Presents to establish care, previously followed by JOSIANE Snell. The following sections were reviewed & updated as appropriate: PMH, PL, PSH, FH, RxH, and SH. Patient states she feels fine but does not want to be evaluated only with a telemed visit. She requests to be seen in person. Prior to Admission medications    Medication Sig Start Date End Date Taking?  Authorizing Provider aspirin delayed-release 81 mg tablet Take 1 Tab by mouth daily. 4/9/20   Skiff, Ezekiel Babcock, NP   atorvastatin (LIPITOR) 40 mg tablet TAKE 1 TABLET BY MOUTH EVERY NIGHT 3/30/20   Skiff, Ezekiel Babcock, NP   amLODIPine (NORVASC) 5 mg tablet TAKE 1 TABLET BY MOUTH EVERY NIGHT FOR HIGH BLOOD PRESSURE 3/30/20   Skiff, Ezekiel Babcock, NP   losartan (COZAAR) 100 mg tablet TAKE 1 TABLET BY MOUTH EVERY NIGHT 3/30/20   Skiff, Ezekiel Babcock, NP   ibuprofen (ADVIL) 200 mg tablet Take 200 mg by mouth two (2) times daily (with meals). Provider, Historical   acetaminophen (TYLENOL ARTHRITIS PAIN) 650 mg TbER Take 650 mg by mouth every six to eight (6-8) hours as needed. Provider, Historical   acetaminophen/diphenhydramine (TYLENOL PM PO) Take  by mouth nightly as needed. Provider, Historical   VIT C/E/ZN/COPPR/LUTEIN/ZEAXAN (PRESERVISION AREDS 2 PO) Take 1 Tab by mouth two (2) times daily (with meals). Provider, Historical   multivitamin (ONE A DAY) tablet Take 1 Tab by mouth daily.     Provider, Historical       Allergies   Allergen Reactions    Ace Inhibitors Cough    Thiazides Photosensitivity       Past Medical History:   Diagnosis Date    Arrhythmia     HEART PALPITATIONS OCCASIONALLY    Arthritis     Chronic pain 2016    LEFT KNEE    Colon polyps     hyperplastic    Hypercholesterolemia     Hypertension     Nausea & vomiting      Past Surgical History:   Procedure Laterality Date    ENDOSCOPY, COLON, DIAGNOSTIC  8/2009    HX CAROTID ENDARTERECTOMY Right 07/01/2019    Dr. Terence Howard    HX HEENT      T&A (childhood)    HX HERNIA REPAIR      umbilical    HX ORTHOPAEDIC Left 2016    MENISCUS REPAIR    HX ORTHOPAEDIC Right     JOINT SURGERY ON MIDDLE FINGER TO REMOVE A GROWTH    HX TUBAL LIGATION  1990          ROS Comprehensive ROS negative other than what is noted in HPI        Objective:     General: alert, cooperative, no distress   Mental  status: normal mood, behavior, speech, dress, motor activity, and thought processes, able to follow commands   Eyes: EOM intact, normal sclera   Mouth: mucous membranes moist   Neck: no visualized mass   Resp: normal effort and no respiratory distress   Neuro: no gross deficits   Musculoskeletal: normal ROM of neck   Skin: no discoloration or lesions of concern on visible areas   Psychiatric: normal affect, no hallucinations         We discussed the expected course, resolution and complications of the diagnosis(es) in detail. Medication risks, benefits, costs, interactions, and alternatives were discussed as indicated. I advised her to contact the office if her condition worsens, changes or fails to improve as anticipated. She expressed understanding with the diagnosis(es) and plan.      Alexa Felton NP

## 2020-11-16 ENCOUNTER — TRANSCRIBE ORDER (OUTPATIENT)
Dept: SCHEDULING | Age: 75
End: 2020-11-16

## 2020-11-16 DIAGNOSIS — Z12.31 VISIT FOR SCREENING MAMMOGRAM: Primary | ICD-10-CM

## 2020-12-07 ENCOUNTER — HOSPITAL ENCOUNTER (OUTPATIENT)
Dept: MAMMOGRAPHY | Age: 75
Discharge: HOME | End: 2020-12-07
Attending: NURSE PRACTITIONER
Payer: MEDICARE

## 2020-12-07 DIAGNOSIS — Z12.31 VISIT FOR SCREENING MAMMOGRAM: ICD-10-CM

## 2020-12-07 PROCEDURE — 77067 SCR MAMMO BI INCL CAD: CPT

## 2021-01-29 ENCOUNTER — IMMUNIZATION (OUTPATIENT)
Dept: INTERNAL MEDICINE CLINIC | Age: 76
End: 2021-01-29
Payer: MEDICARE

## 2021-01-29 DIAGNOSIS — Z23 ENCOUNTER FOR IMMUNIZATION: Primary | ICD-10-CM

## 2021-01-29 PROCEDURE — 0011A PR IMM ADMN SARSCOV2 100 MCG/0.5 ML 1ST DOSE: CPT | Performed by: FAMILY MEDICINE

## 2021-01-29 PROCEDURE — 91301 COVID-19, MRNA, LNP-S, PF, 100MCG/0.5ML DOSE(MODERNA): CPT | Performed by: FAMILY MEDICINE

## 2021-02-26 ENCOUNTER — IMMUNIZATION (OUTPATIENT)
Dept: INTERNAL MEDICINE CLINIC | Age: 76
End: 2021-02-26
Payer: MEDICARE

## 2021-02-26 DIAGNOSIS — Z23 ENCOUNTER FOR IMMUNIZATION: Primary | ICD-10-CM

## 2021-02-26 PROCEDURE — 0012A COVID-19, MRNA, LNP-S, PF, 100MCG/0.5ML DOSE(MODERNA): CPT | Performed by: FAMILY MEDICINE

## 2021-02-26 PROCEDURE — 91301 COVID-19, MRNA, LNP-S, PF, 100MCG/0.5ML DOSE(MODERNA): CPT | Performed by: FAMILY MEDICINE

## 2021-06-16 ENCOUNTER — OFFICE VISIT (OUTPATIENT)
Dept: INTERNAL MEDICINE CLINIC | Age: 76
End: 2021-06-16
Payer: MEDICARE

## 2021-06-16 VITALS
TEMPERATURE: 98.4 F | BODY MASS INDEX: 37.69 KG/M2 | SYSTOLIC BLOOD PRESSURE: 120 MMHG | HEIGHT: 65 IN | HEART RATE: 72 BPM | OXYGEN SATURATION: 97 % | WEIGHT: 226.2 LBS | DIASTOLIC BLOOD PRESSURE: 78 MMHG | RESPIRATION RATE: 16 BRPM

## 2021-06-16 DIAGNOSIS — M79.671 CHRONIC PAIN OF RIGHT HEEL: ICD-10-CM

## 2021-06-16 DIAGNOSIS — Z00.00 MEDICARE ANNUAL WELLNESS VISIT, SUBSEQUENT: ICD-10-CM

## 2021-06-16 DIAGNOSIS — E78.00 HYPERCHOLESTEREMIA: ICD-10-CM

## 2021-06-16 DIAGNOSIS — R73.03 PREDIABETES: ICD-10-CM

## 2021-06-16 DIAGNOSIS — G89.29 CHRONIC PAIN OF RIGHT HEEL: ICD-10-CM

## 2021-06-16 DIAGNOSIS — M25.562 CHRONIC PAIN OF BOTH KNEES: ICD-10-CM

## 2021-06-16 DIAGNOSIS — I10 BENIGN ESSENTIAL HYPERTENSION: Primary | ICD-10-CM

## 2021-06-16 DIAGNOSIS — E66.9 OBESITY (BMI 35.0-39.9 WITHOUT COMORBIDITY): ICD-10-CM

## 2021-06-16 DIAGNOSIS — M25.561 CHRONIC PAIN OF BOTH KNEES: ICD-10-CM

## 2021-06-16 DIAGNOSIS — G89.29 CHRONIC PAIN OF BOTH KNEES: ICD-10-CM

## 2021-06-16 PROBLEM — E66.01 SEVERE OBESITY (HCC): Status: RESOLVED | Noted: 2018-11-19 | Resolved: 2021-06-16

## 2021-06-16 PROCEDURE — G8752 SYS BP LESS 140: HCPCS | Performed by: NURSE PRACTITIONER

## 2021-06-16 PROCEDURE — 1090F PRES/ABSN URINE INCON ASSESS: CPT | Performed by: NURSE PRACTITIONER

## 2021-06-16 PROCEDURE — G8427 DOCREV CUR MEDS BY ELIG CLIN: HCPCS | Performed by: NURSE PRACTITIONER

## 2021-06-16 PROCEDURE — G8536 NO DOC ELDER MAL SCRN: HCPCS | Performed by: NURSE PRACTITIONER

## 2021-06-16 PROCEDURE — G8399 PT W/DXA RESULTS DOCUMENT: HCPCS | Performed by: NURSE PRACTITIONER

## 2021-06-16 PROCEDURE — G9899 SCRN MAM PERF RSLTS DOC: HCPCS | Performed by: NURSE PRACTITIONER

## 2021-06-16 PROCEDURE — 99214 OFFICE O/P EST MOD 30 MIN: CPT | Performed by: NURSE PRACTITIONER

## 2021-06-16 PROCEDURE — G8417 CALC BMI ABV UP PARAM F/U: HCPCS | Performed by: NURSE PRACTITIONER

## 2021-06-16 PROCEDURE — 3017F COLORECTAL CA SCREEN DOC REV: CPT | Performed by: NURSE PRACTITIONER

## 2021-06-16 PROCEDURE — 1101F PT FALLS ASSESS-DOCD LE1/YR: CPT | Performed by: NURSE PRACTITIONER

## 2021-06-16 PROCEDURE — G0463 HOSPITAL OUTPT CLINIC VISIT: HCPCS | Performed by: NURSE PRACTITIONER

## 2021-06-16 PROCEDURE — G8754 DIAS BP LESS 90: HCPCS | Performed by: NURSE PRACTITIONER

## 2021-06-16 PROCEDURE — G8432 DEP SCR NOT DOC, RNG: HCPCS | Performed by: NURSE PRACTITIONER

## 2021-06-16 PROCEDURE — G0439 PPPS, SUBSEQ VISIT: HCPCS | Performed by: NURSE PRACTITIONER

## 2021-06-16 RX ORDER — ACETAMINOPHEN 500 MG
2000 TABLET ORAL 2 TIMES DAILY
COMMUNITY

## 2021-06-16 NOTE — PATIENT INSTRUCTIONS
1. Schedule apt with Dr. Alondra Canseco or Conchita Velázquez for the knees and consult them for the ankle 2. Get fasting labs as soon as possible. 10hrs no eating but make sure you are well hydrated with water 3. Schedule podiatry visit with Dr. Raphael Leslie

## 2021-06-16 NOTE — PROGRESS NOTES
Subjective:      Leonardo Eubanks is a 70 y.o. female who presents today for follow up. Cardiovascular Review  The patient has hypertension and hyperlipidemia. She reports taking medications as instructed, no medication side effects noted. She generally follows a low fat low cholesterol diet, generally follows a low sodium diet, exercises regularly. BP Readings from Last 3 Encounters:   06/16/21 (!) 140/72   12/06/19 130/70   07/08/19 130/66       Chronic knee pain: she was seen by ortho with Joe 2 years ago. Body mass index is 38.11 kg/m². Wt Readings from Last 3 Encounters:   06/16/21 226 lb 3.2 oz (102.6 kg)   12/06/19 235 lb (106.6 kg)   07/08/19 235 lb (106.6 kg)         Patient Active Problem List    Diagnosis Date Noted    Carotid stenosis, asymptomatic, right 07/01/2019    Stenosis of right carotid artery 05/31/2019    H/O bone density study 12/10/2013    Pap smear for cervical cancer screening 11/20/2013    Prediabetes 10/11/2013    Personal history of colonic polyps 10/01/2013    Hypercholesteremia 10/01/2013    Obesity (BMI 35.0-39.9 without comorbidity) 10/01/2013    Benign essential hypertension 09/09/2011     Current Outpatient Medications   Medication Sig Dispense Refill    cholecalciferol (VITAMIN D3) (2,000 UNITS /50 MCG) cap capsule Take 2,000 Units by mouth two (2) times a day.  atorvastatin (LIPITOR) 40 mg tablet TAKE 1 TABLET BY MOUTH EVERY EVENING 90 Tab 0    losartan (COZAAR) 100 mg tablet TAKE 1 TABLET BY MOUTH EVERY EVENING 90 Tab 0    amLODIPine (NORVASC) 5 mg tablet Take 1 Tab by mouth every evening. APPOINTMENT AND LABS NEEDED BEFORE NEXT REFILL 90 Tab 0    aspirin delayed-release 81 mg tablet TAKE 1 TABLET BY MOUTH EVERY DAY 90 Tab 3    acetaminophen (TYLENOL ARTHRITIS PAIN) 650 mg TbER Take 650 mg by mouth every six to eight (6-8) hours as needed.  acetaminophen/diphenhydramine (TYLENOL PM PO) Take  by mouth nightly as needed.       VIT C/E/ZN/COPPR/LUTEIN/ZEAXAN (PRESERVISION AREDS 2 PO) Take 1 Tab by mouth two (2) times daily (with meals).  multivitamin (ONE A DAY) tablet Take 1 Tab by mouth daily. Allergies   Allergen Reactions    Ace Inhibitors Cough    Thiazides Photosensitivity     Past Medical History:   Diagnosis Date    Arrhythmia     HEART PALPITATIONS OCCASIONALLY    Arthritis     Chronic pain 2016    LEFT KNEE    Colon polyps     hyperplastic    Hypercholesterolemia     Hypertension     Nausea & vomiting      Past Surgical History:   Procedure Laterality Date    ENDOSCOPY, COLON, DIAGNOSTIC  8/2009    HX CAROTID ENDARTERECTOMY Right 07/01/2019    Dr. Stephan Gómez    HX HEENT      T&A (childhood)    HX HERNIA REPAIR      umbilical    HX ORTHOPAEDIC Left 2016    MENISCUS REPAIR    HX ORTHOPAEDIC Right     JOINT SURGERY ON MIDDLE FINGER TO REMOVE A GROWTH    HX TUBAL LIGATION  1990        Review of Systems    A comprehensive review of systems was negative except for that written in the HPI. Objective:     Visit Vitals  /78   Pulse 72   Temp 98.4 °F (36.9 °C) (Temporal)   Resp 16   Ht 5' 4.6\" (1.641 m)   Wt 226 lb 3.2 oz (102.6 kg)   SpO2 97%   BMI 38.11 kg/m²       General:  Alert, cooperative, no distress, appears stated age,. Head:  Normocephalic, without obvious abnormality, atraumatic. Eyes:  Conjunctivae/corneas clear. PERRL, EOMs intact. Ears:  Normal TMs and external ear canals both ears. Throat: Deferred   Neck: Supple, symmetrical, trachea midline, no adenopathy, thyroid: no enlargement/tenderness/nodules, no carotid bruit and no JVD. Back:   Symmetric, no curvature. ROM normal. No CVA tenderness. Lungs:   Clear to auscultation bilaterally. Chest wall:  No tenderness or deformity. Heart:  Regular rate and rhythm, S1, S2 normal, no murmur, click, rub or gallop. Abdomen:   Soft, non-tender. Bowel sounds normal. No masses,  No organomegaly.    Extremities: Extremities normal, atraumatic, no cyanosis or edema. Pulses: 2+ and symmetric all extremities. Skin: Skin color, texture, turgor normal. No rashes or lesions. Lymph nodes: Cervical, supraclavicular, and axillary nodes normal.   Neurologic: CNII-XII intact. Normal strength, sensation throughout. Nursing note and vitals reviewed  Assessment/Plan:     1. Benign essential hypertension  - BP was controlled with repeat measurement  - CBC WITH AUTOMATED DIFF; Future  - LIPID PANEL; Future  - METABOLIC PANEL, COMPREHENSIVE; Future  - HEMOGLOBIN A1C WITH EAG; Future  - URINALYSIS W/ RFLX MICROSCOPIC; Future    2. Obesity (BMI 35.0-39.9 without comorbidity)  - weight loss recommended  - HEMOGLOBIN A1C WITH EAG; Future    3. Hypercholesteremia  - LIPID PANEL; Future    4. Prediabetes  - HEMOGLOBIN A1C WITH EAG; Future    5. Medicare annual wellness visit, subsequent  - HM updated    6. Chronic pain of both knees  - REFERRAL TO ORTHOPEDICS    7. Chronic pain of right heel  - REFERRAL TO PODIATRY      Follow-up and Dispositions    · Return for Find a new PCP with list provided . Advised her to call back or return to office if symptoms worsen/change/persist.  Discussed expected course/resolution/complications of diagnosis in detail with patient. Medication risks/benefits/costs/interactions/alternatives discussed with patient. She was given an after visit summary which includes diagnoses, current medications, & vitals. She expressed understanding with the diagnosis and plan.

## 2021-06-16 NOTE — PROGRESS NOTES
This is the Subsequent Medicare Annual Wellness Exam, performed 12 months or more after the Initial AWV or the last Subsequent AWV    I have reviewed the patient's medical history in detail and updated the computerized patient record. Assessment/Plan   Education and counseling provided:  Are appropriate based on today's review and evaluation    1. Benign essential hypertension  2. Obesity (BMI 35.0-39.9 without comorbidity)  3. Hypercholesteremia  4. Prediabetes       Depression Risk Factor Screening     3 most recent PHQ Screens 6/16/2021   Little interest or pleasure in doing things Several days   Feeling down, depressed, irritable, or hopeless Several days   Total Score PHQ 2 2       Alcohol Risk Screen    Do you average more than 1 drink per night or more than 7 drinks a week:  No    On any one occasion in the past three months have you have had more than 3 drinks containing alcohol:  No        Functional Ability and Level of Safety    Hearing: Hearing is good. Activities of Daily Living: The home contains: no safety equipment. Patient does total self care      Ambulation: with mild difficulty and related to chronic knee pain     Fall Risk:  Fall Risk Assessment, last 12 mths 6/16/2021   Able to walk? Yes   Fall in past 12 months? 0   Are you worried about falling 0   Number of falls in past 12 months -   Fall with injury?  -      Abuse Screen:  Patient is not abused       Cognitive Screening    Has your family/caregiver stated any concerns about your memory: no     Cognitive Screening: normal    Health Maintenance Due     Health Maintenance Due   Topic Date Due    Medicare Yearly Exam  12/06/2020    A1C test (Diabetic or Prediabetic)  12/26/2020    Lipid Screen  12/26/2020       Patient Care Team   Patient Care Team:  Servando Jimenez NP as PCP - General (Nurse Practitioner)  Servando Jimenez NP as PCP - [facility] Empaneled Provider  Geralynn Siemens, MD (Orthopedic Surgery)    History     Patient Active Problem List   Diagnosis Code    Benign essential hypertension I10    Personal history of colonic polyps Z86.010    Hypercholesteremia E78.00    Obesity (BMI 35.0-39.9 without comorbidity) E66.9    Prediabetes R73.03    Pap smear for cervical cancer screening Z12.4    H/O bone density study Z92.89    Stenosis of right carotid artery I65.21    Carotid stenosis, asymptomatic, right I65.21     Past Medical History:   Diagnosis Date    Arrhythmia     HEART PALPITATIONS OCCASIONALLY    Arthritis     Chronic pain 2016    LEFT KNEE    Colon polyps     hyperplastic    Hypercholesterolemia     Hypertension     Nausea & vomiting       Past Surgical History:   Procedure Laterality Date    ENDOSCOPY, COLON, DIAGNOSTIC  8/2009    HX CAROTID ENDARTERECTOMY Right 07/01/2019    Dr. Eliazar Early    HX HEENT      T&A (childhood)    HX HERNIA REPAIR      umbilical    HX ORTHOPAEDIC Left 2016    MENISCUS REPAIR    HX ORTHOPAEDIC Right     JOINT SURGERY ON MIDDLE FINGER TO REMOVE A GROWTH    HX TUBAL LIGATION  1990     Current Outpatient Medications   Medication Sig Dispense Refill    cholecalciferol (VITAMIN D3) (2,000 UNITS /50 MCG) cap capsule Take 2,000 Units by mouth two (2) times a day.  atorvastatin (LIPITOR) 40 mg tablet TAKE 1 TABLET BY MOUTH EVERY EVENING 90 Tab 0    losartan (COZAAR) 100 mg tablet TAKE 1 TABLET BY MOUTH EVERY EVENING 90 Tab 0    amLODIPine (NORVASC) 5 mg tablet Take 1 Tab by mouth every evening. APPOINTMENT AND LABS NEEDED BEFORE NEXT REFILL 90 Tab 0    aspirin delayed-release 81 mg tablet TAKE 1 TABLET BY MOUTH EVERY DAY 90 Tab 3    acetaminophen (TYLENOL ARTHRITIS PAIN) 650 mg TbER Take 650 mg by mouth every six to eight (6-8) hours as needed.  acetaminophen/diphenhydramine (TYLENOL PM PO) Take  by mouth nightly as needed.  VIT C/E/ZN/COPPR/LUTEIN/ZEAXAN (PRESERVISION AREDS 2 PO) Take 1 Tab by mouth two (2) times daily (with meals).       multivitamin (ONE A DAY) tablet Take 1 Tab by mouth daily. Allergies   Allergen Reactions    Ace Inhibitors Cough    Thiazides Photosensitivity       Family History   Problem Relation Age of Onset    Macular Degen Mother     Arthritis-osteo Mother     Osteoporosis Mother     Other Mother         tremor    Cancer Mother         Esophageal    Parkinsonism Father     Stroke Father         CVA (42's)   Nora Vizcaino Bipolar Disorder Sister     No Known Problems Sister     No Known Problems Daughter     Attention Deficit Hyperactivity Disorder Daughter     No Known Problems Daughter     Anesth Problems Neg Hx      Social History     Tobacco Use    Smoking status: Never Smoker    Smokeless tobacco: Never Used   Substance Use Topics    Alcohol use:  Yes     Alcohol/week: 9.0 standard drinks     Types: 4 Shots of liquor, 5 Standard drinks or equivalent per week     Comment: gaby Riggins NP

## 2021-06-23 ENCOUNTER — HOSPITAL ENCOUNTER (OUTPATIENT)
Dept: LAB | Age: 76
Discharge: HOME | End: 2021-06-23
Payer: MEDICARE

## 2021-06-23 PROCEDURE — 80053 COMPREHEN METABOLIC PANEL: CPT

## 2021-06-23 PROCEDURE — 36415 COLL VENOUS BLD VENIPUNCTURE: CPT

## 2021-06-23 PROCEDURE — 85025 COMPLETE CBC W/AUTO DIFF WBC: CPT

## 2021-06-23 PROCEDURE — 83036 HEMOGLOBIN GLYCOSYLATED A1C: CPT

## 2021-06-23 PROCEDURE — 81015 MICROSCOPIC EXAM OF URINE: CPT

## 2021-06-23 PROCEDURE — 81001 URINALYSIS AUTO W/SCOPE: CPT

## 2021-06-23 PROCEDURE — 80061 LIPID PANEL: CPT

## 2021-06-24 LAB
ALBUMIN SERPL-MCNC: 4.4 G/DL (ref 3.7–4.7)
ALBUMIN/GLOB SERPL: 1.5 {RATIO} (ref 1.2–2.2)
ALP SERPL-CCNC: 90 IU/L (ref 48–121)
ALT SERPL-CCNC: 25 IU/L (ref 0–32)
APPEARANCE UR: CLEAR
AST SERPL-CCNC: 25 IU/L (ref 0–40)
BACTERIA #/AREA URNS HPF: NORMAL /[HPF]
BASOPHILS # BLD AUTO: 0.1 X10E3/UL (ref 0–0.2)
BASOPHILS NFR BLD AUTO: 1 %
BILIRUB SERPL-MCNC: 0.9 MG/DL (ref 0–1.2)
BILIRUB UR QL STRIP: NEGATIVE
BUN SERPL-MCNC: 10 MG/DL (ref 8–27)
BUN/CREAT SERPL: 17 (ref 12–28)
CALCIUM SERPL-MCNC: 9.7 MG/DL (ref 8.7–10.3)
CASTS URNS QL MICRO: NORMAL /LPF
CHLORIDE SERPL-SCNC: 105 MMOL/L (ref 96–106)
CHOLEST SERPL-MCNC: 182 MG/DL (ref 100–199)
CO2 SERPL-SCNC: 25 MMOL/L (ref 20–29)
COLOR UR: YELLOW
CREAT SERPL-MCNC: 0.6 MG/DL (ref 0.57–1)
EOSINOPHIL # BLD AUTO: 0.3 X10E3/UL (ref 0–0.4)
EOSINOPHIL NFR BLD AUTO: 5 %
EPI CELLS #/AREA URNS HPF: NORMAL /HPF (ref 0–10)
ERYTHROCYTE [DISTWIDTH] IN BLOOD BY AUTOMATED COUNT: 12.1 % (ref 11.7–15.4)
GLOBULIN SER CALC-MCNC: 2.9 G/DL (ref 1.5–4.5)
GLUCOSE SERPL-MCNC: 109 MG/DL (ref 65–99)
GLUCOSE UR QL: NEGATIVE
HBA1C MFR BLD: 5.8 % (ref 4.8–5.6)
HCT VFR BLD AUTO: 41.8 % (ref 34–46.6)
HDLC SERPL-MCNC: 53 MG/DL
HGB BLD-MCNC: 14.4 G/DL (ref 11.1–15.9)
HGB UR QL STRIP: NEGATIVE
IMM GRANULOCYTES # BLD AUTO: 0 X10E3/UL (ref 0–0.1)
IMM GRANULOCYTES NFR BLD AUTO: 0 %
KETONES UR QL STRIP: NEGATIVE
LDLC SERPL CALC-MCNC: 102 MG/DL (ref 0–99)
LEUKOCYTE ESTERASE UR QL STRIP: NEGATIVE
LYMPHOCYTES # BLD AUTO: 3 X10E3/UL (ref 0.7–3.1)
LYMPHOCYTES NFR BLD AUTO: 48 %
MCH RBC QN AUTO: 30.6 PG (ref 26.6–33)
MCHC RBC AUTO-ENTMCNC: 34.4 G/DL (ref 31.5–35.7)
MCV RBC AUTO: 89 FL (ref 79–97)
MICRO URNS: NORMAL
MICRO URNS: NORMAL
MONOCYTES # BLD AUTO: 0.4 X10E3/UL (ref 0.1–0.9)
MONOCYTES NFR BLD AUTO: 7 %
NEUTROPHILS # BLD AUTO: 2.4 X10E3/UL (ref 1.4–7)
NEUTROPHILS NFR BLD AUTO: 39 %
NITRITE UR QL STRIP: NEGATIVE
PH UR STRIP: 7.5 [PH] (ref 5–7.5)
PLATELET # BLD AUTO: 237 X10E3/UL (ref 150–450)
POTASSIUM SERPL-SCNC: 4.3 MMOL/L (ref 3.5–5.2)
PROT SERPL-MCNC: 7.3 G/DL (ref 6–8.5)
PROT UR QL STRIP: NEGATIVE
RBC # BLD AUTO: 4.7 X10E6/UL (ref 3.77–5.28)
RBC #/AREA URNS HPF: NORMAL /HPF (ref 0–2)
SODIUM SERPL-SCNC: 142 MMOL/L (ref 134–144)
SP GR UR: 1.02 (ref 1–1.03)
TRIGL SERPL-MCNC: 153 MG/DL (ref 0–149)
UROBILINOGEN UR STRIP-MCNC: 0.2 MG/DL (ref 0.2–1)
VLDLC SERPL CALC-MCNC: 27 MG/DL (ref 5–40)
WBC # BLD AUTO: 6.1 X10E3/UL (ref 3.4–10.8)
WBC #/AREA URNS HPF: NORMAL /HPF (ref 0–5)

## 2021-06-24 NOTE — PROGRESS NOTES
A1c show slight improvement but is still in prediabetic ranges. Work on your diet to limit sugars and simple carbs. Fasting glucose is still too high. Cholesterol has increased slightly since last year. Work on eating low fats and limiting red meat or full fat dairy. Avoid saturated foods, greasy and fried foods.      Everything else was normal.

## 2021-07-15 ENCOUNTER — TELEPHONE (OUTPATIENT)
Dept: INTERNAL MEDICINE CLINIC | Age: 76
End: 2021-07-15

## 2021-08-02 RX ORDER — ASPIRIN 81 MG/1
81 TABLET ORAL DAILY
Qty: 90 TABLET | Refills: 0 | Status: SHIPPED | OUTPATIENT
Start: 2021-08-02

## 2021-09-16 ENCOUNTER — APPOINTMENT (OUTPATIENT)
Dept: PHYSICAL THERAPY | Age: 76
End: 2021-09-16

## 2021-09-28 ENCOUNTER — HOSPITAL ENCOUNTER (OUTPATIENT)
Dept: PHYSICAL THERAPY | Age: 76
Discharge: HOME | End: 2021-09-28
Payer: MEDICARE

## 2021-09-28 PROCEDURE — 97161 PT EVAL LOW COMPLEX 20 MIN: CPT | Performed by: PHYSICAL THERAPIST

## 2021-09-28 PROCEDURE — 97110 THERAPEUTIC EXERCISES: CPT | Performed by: PHYSICAL THERAPIST

## 2021-09-28 NOTE — PROGRESS NOTES
[facility] Physical Therapy and Sports Medicine  [address], [place], [address]  Phone: [phone]  Fax: [phone]      PT INITIAL EVALUATION NOTE - [facility] 2-15    Patient Name: Toney Salamanca  Date:2021  : 1945  [x]  Patient  Verified  Payor: Sonal Cano / Plan: [place] & [place] / Product Type: Medicare /    In time:1035 A  Out time:11:20A  Total Treatment Time (min):  45  Total Timed Codes (min): 15  1:1 Treatment Time ([address] only): 39   Visit #: 1     Treatment Area: Left knee pain [M25.562]    SUBJECTIVE  Any medication changes, allergies to medications, adverse drug reactions, diagnosis change, or new procedure performed?: [] No    [x] Yes (see summary sheet for update)    Current symptoms/chief complaint and date of onset/injury:   Pt presents with referral for L knee OA. States that she had surgery for a torn meniscus in  by Dr. Kaaren Hodgkin. She reports now she has OA in both knees, \"my left one is bone on bone\". She has been to see Dr. Sara Bass and Dr. Inocencio Silva- x-rays. Was told \"I wasn't ready for surgery\". She is very uncomfortable throughout the day. Her knee \"cracks\" every time she moves. She was given a cortisone injection from Dr. Inocencio Silva in Aug    Aggravated by: inc'd movement  Eased by:  rest    Pain:   10/10 max 2-3/10 min 2-3/10 now       Location and description of symptoms: L knee    Diagnostic Tests: [] Lab work [x] X-rays    [] CT [] MRI     [] Other:  Results (per report of the patient): OA    PMHX: Significant for arthritis, torn meniscus repair L knee in 2016  Social/Recreation/Work: Does not work. Lives in . Has 3 daughters, 3 3yo grandson.  \"I am not very active\"  Prior level of function: chronic L knee pain  Patient goal(s): decreased pain    Objective:    Posture/Observations: ant pevlic tilt, stands in slight knee flex, hip flex bilat  Gait: Lacking full ext on L, dec'd stance time on L  Stairs: ascends with reciprocal pattern; descends with step to pattern leading with R LE (*educated pt on proper sequencing on stairs)         ROM  AROM L knee= -3- 103 deg  AROM R knee= 0-125 deg    Strength (1-5)           Right Left Comments   Hip flexion 4+ 4 -   Knee ext 4+ 4 -   Knee flex 4+ 4+ -   Ankle DF 5 5 -   Difficulty performing supine SLR on L, \"am I doing it?  It hurts\"     Functional Biomechanical Screen  Tandem stance: approx 5 sec bilat                              Joint Mobility:  Hypomobile L patellar mobs    Outcome Measure:  FOTO NT       OBJECTIVE    15 min Therapeutic Exercise:  [x] See flow sheet : instructed in HEP   Rationale: increase ROM and increase strength to improve the patients ability to perform daily chores, activities with dec'd symptoms          With   [x] TE   [] TA   [] neuro   [] other: Patient Education: [x] Review HEP    [] Progressed/Changed HEP based on:   [] positioning   [] body mechanics   [] transfers   [x] heat/ice application    [x] other: nato/phys; PT POC; importance of performing HEP in order to maximize benefit of PT; use of ice/heat for pain management; encouraged her to begin walking program-- start with 5-10 min walk 1-2x/day; also encouraged her to follow through with prescription for custom orthotics     Pain Level (0-10 scale) post treatment: not reported    Assessment: See POC    Plan: See Russ Ortega PT, DPT    9/28/2021

## 2021-09-28 NOTE — PROGRESS NOTES
Physical Therapy at [facility],   a part of [address] [facility]  [address] [facility], [facility] [facility]  Phone: [phone]  Fax: [phone]    Plan of Care/Statement of Necessity for Physical Therapy Services  2-15    Patient name: Thiago Cerda  : 1945  Provider#: [id number]  Referral source: Marino Chopra MD      Medical/Treatment Diagnosis: Left knee pain [M25.562]     Prior Hospitalization: see medical history     Comorbidities: see evaluation  Prior Level of Function: see evaluation  Medications: Verified on Patient Summary List  Start of Care: 21      Onset Date: chronic   The Plan of Care and following information is based on the information from the initial evaluation. Assessment/ key information: Pt is a 70year old female presenting with signs and symptoms consistent with referral for L knee OA.  She will benefit from PT to address problem list below    Evaluation Complexity History MEDIUM  Complexity : 1-2 comorbidities / personal factors will impact the outcome/ POC ; Examination LOW Complexity : 1-2 Standardized tests and measures addressing body structure, function, activity limitation and / or participation in recreation  ;Presentation LOW Complexity : Stable, uncomplicated  ;Clinical Decision Making MEDIUM Complexity : FOTO score of 26-74  Overall Complexity Rating: LOW     Problem List: pain affecting function, decrease ROM, decrease strength, impaired gait/ balance, decrease ADL/ functional abilitiies, decrease activity tolerance, decrease flexibility/ joint mobility and decrease transfer abilities   Treatment Plan may include any combination of the following: Therapeutic exercise, Therapeutic activities, Neuromuscular re-education, Physical agent/modality, Gait/balance training, Manual therapy, Patient education, Self Care training, Functional mobility training, Home safety training and Stair training  Patient / Family readiness to learn indicated by: asking questions, trying to perform skills and interest  Persons(s) to be included in education: patient (P)  Barriers to Learning/Limitations: None  Patient Goal (s): see evaluation  Patient Self Reported Health Status: fair  Rehabilitation Potential: good    Short Term Goals: To be accomplished in 3-4 weeks:  1) Pt will be independent in initial HEP  2) Pt will report >/=25% improvement in symptoms  3) Pt will report compliance with walking program for exercise  4) Pt will demonstrate proper LE sequencing on stairs    Long Term Goals: To be accomplished in 10-12 weeks:  1) Pt will be able to perform supine SLR x10 on L in order to demonstrate improvement in quad strength  2) Pt will demonstrate >/=115 deg AROM R knee flex in order to improve ability to perform daily chores  3) Pt will report >/=75% improvement in symptoms    Frequency / Duration: Patient to be seen 1-2 times per week for 10-12 weeks. Patient/ Caregiver education and instruction: self care, activity modification and exercises    [x]  Plan of care has been reviewed with PTA    Certification Period: 9/28/21-12/22/21  eMek Adamson, PT 9/28/2021  _______________________________________________________________    I certify that the above Therapy Services are being furnished while the patient is under my care. I agree with the treatment plan and certify that this therapy is necessary.     Physician's Signature:____________________  Date:____________Time: _________         Rebecca Benítez MD

## 2021-10-06 ENCOUNTER — HOSPITAL ENCOUNTER (OUTPATIENT)
Dept: PHYSICAL THERAPY | Age: 76
Discharge: HOME | End: 2021-10-06
Payer: MEDICARE

## 2021-10-06 PROCEDURE — 97016 VASOPNEUMATIC DEVICE THERAPY: CPT | Performed by: PHYSICAL THERAPIST

## 2021-10-06 PROCEDURE — 97140 MANUAL THERAPY 1/> REGIONS: CPT | Performed by: PHYSICAL THERAPIST

## 2021-10-06 PROCEDURE — 97110 THERAPEUTIC EXERCISES: CPT | Performed by: PHYSICAL THERAPIST

## 2021-10-06 NOTE — PROGRESS NOTES
PT DAILY TREATMENT NOTE - [facility] 2-15    Patient Name: Emely Desai  Date:10/6/2021  : 1945  [x]  Patient  Verified  Payor: [facility] [employer] / Plan: [place] MEDICARE PART A & B / Product Type: Medicare /    In time:11:00 A  Out time: 12:00 P  Total Treatment Time (min): 60  Total Timed Codes (min): 50  1:1 Treatment Time ( [address] only): 39   Visit #:  2    Treatment Area: Left knee pain [M25.562]    SUBJECTIVE  Pain Level (0-10 scale): 6-7  Any medication changes, allergies to medications, adverse drug reactions, diagnosis change, or new procedure performed?: [x] No    [] Yes (see summary sheet for update)  Subjective functional status/changes:   [] No changes reported  \"it really hurts. All the time\". She reports good compliance with PT HEP, icing/heating, however her knee is just really hurting.  She is wondering if she has been going up/down the stairs correctly so would like to go over that today    OBJECTIVE    Modality rationale: decrease edema, decrease inflammation and decrease pain to improve the patients ability to walk, stand with dec'd symptoms   Min Type Additional Details       [] Estim: []Att   []Unatt    []TENS instruct                  []IFC  []Premod   []NMES                     []Other:  []w/US   []w/ice   []w/heat  Position:  Location:    []  Ultrasound: []Continuous   [] Pulsed                       at: []1MHz   []3MHz Location:  W/cm2:    []  Ice     []  Heat  []  Ice massage Position:  Location:     10 [x]  Vasopneumatic Device  +compression thus reducing edema Pressure:       [] lo [] med [] hi   Temperature:      [x] Skin assessment post-treatment:  [x]intact []redness- no adverse reaction    []redness  adverse reaction:     40 min Therapeutic Exercise:  [x] See flow sheet : progressed per flow sheet   Rationale: increase ROM, increase strength, improve coordination, improve balance and increase proprioception to improve the patients ability to perform ADL's, daily activities with dec'd symptoms    10 min Manual Therapy:  L patellar mobs all directions  STM distal quad, med retinaculum     Rationale: decrease pain, increase ROM and increase tissue extensibility to improve the patients ability to walk, stand, perform ADL's with dec'd symptoms            With   [] TE   [] TA   [] Neuro   [] SC   [] other: Patient Education: [x] Review HEP    [] Progressed/Changed HEP based on:   [] positioning   [] body mechanics   [] transfers   [] heat/ice application    [] other:      Other Objective/Functional Measures: n/a     Pain Level (0-10 scale) post treatment: \"cold\"    ASSESSMENT/Changes in Function:   Pt renard addition of new exercises well today. Reviewed proper LE sequencing ascending and descending stairs. Patient will continue to benefit from skilled PT services to modify and progress therapeutic interventions, address functional mobility deficits, address ROM deficits, address strength deficits, analyze and address soft tissue restrictions, analyze and cue movement patterns, analyze and modify body mechanics/ergonomics and assess and modify postural abnormalities to attain remaining goals.      [x]  See Plan of Care  []  See progress note/recertification  []  See Discharge Summary         Progress towards goals / Updated goals:  NT    PLAN  [x]  Upgrade activities as tolerated     [x]  Continue plan of care  []  Update interventions per flow sheet       []  Discharge due to:_  []  Other:_      John Hall, PT 10/6/2021

## 2021-10-13 ENCOUNTER — HOSPITAL ENCOUNTER (OUTPATIENT)
Dept: PHYSICAL THERAPY | Age: 76
Discharge: HOME | End: 2021-10-13
Payer: MEDICARE

## 2021-10-13 PROCEDURE — 97140 MANUAL THERAPY 1/> REGIONS: CPT | Performed by: PHYSICAL THERAPIST

## 2021-10-13 PROCEDURE — 97110 THERAPEUTIC EXERCISES: CPT | Performed by: PHYSICAL THERAPIST

## 2021-10-13 NOTE — PROGRESS NOTES
PT DAILY TREATMENT NOTE - [facility] 2-15    Patient Name: Monisha First  Date:10/13/2021  : 1945  [x]  Patient  Verified  Payor: Karen Hazard / Plan: [place] MEDICARE PART A & B / Product Type: Medicare /    In time:11:05 A  Out time: 12:00 P  Total Treatment Time (min): 55  Total Timed Codes (min): 45  1:1 Treatment Time ( [address] only): 40   Visit #:  3    Treatment Area: Left knee pain [M25.562]    SUBJECTIVE  Pain Level (0-10 scale): 5-6  Any medication changes, allergies to medications, adverse drug reactions, diagnosis change, or new procedure performed?: [x] No    [] Yes (see summary sheet for update)  Subjective functional status/changes:   [] No changes reported  Pt reports she has been very active the past week. \"it hurts like it always does\". She went to her house in [place], [place] end of last week/over the weekend and states she did a lot of walking.  \"didn't make it any worse\"    OBJECTIVE    Modality rationale: decrease edema, decrease inflammation and decrease pain to improve the patients ability to walk, stand with dec'd symptoms   Min Type Additional Details       [] Estim: []Att   []Unatt    []TENS instruct                  []IFC  []Premod   []NMES                     []Other:  []w/US   []w/ice   []w/heat  Position:  Location:    []  Ultrasound: []Continuous   [] Pulsed                       at: []1MHz   []3MHz Location:  W/cm2:   decl [x]  Ice     []  Heat  []  Ice massage Position:  Location:      []  Vasopneumatic Device  +compression thus reducing edema Pressure:       [] lo [] med [] hi   Temperature:      [x] Skin assessment post-treatment:  [x]intact []redness- no adverse reaction    []redness  adverse reaction:     45 min Therapeutic Exercise:  [x] See flow sheet : progressed per flow sheet   Rationale: increase ROM, increase strength, improve coordination, improve balance and increase proprioception to improve the patients ability to perform ADL's, daily activities with dec'd symptoms    10 min Manual Therapy:  L patellar mobs all directions  STM distal quad, med retinaculum     Rationale: decrease pain, increase ROM and increase tissue extensibility to improve the patients ability to walk, stand, perform ADL's with dec'd symptoms            With   [] TE   [] TA   [] Neuro   [] SC   [] other: Patient Education: [x] Review HEP    [] Progressed/Changed HEP based on:   [] positioning   [] body mechanics   [] transfers   [] heat/ice application    [] other:      Other Objective/Functional Measures: n/a     Pain Level (0-10 scale) post treatment: \"okay\"    ASSESSMENT/Changes in Function:   Pt renard therapy session well. Fatigued after visit. Patient will continue to benefit from skilled PT services to modify and progress therapeutic interventions, address functional mobility deficits, address ROM deficits, address strength deficits, analyze and address soft tissue restrictions, analyze and cue movement patterns, analyze and modify body mechanics/ergonomics and assess and modify postural abnormalities to attain remaining goals.      [x]  See Plan of Care  []  See progress note/recertification  []  See Discharge Summary         Progress towards goals / Updated goals:  NT    PLAN  [x]  Upgrade activities as tolerated     [x]  Continue plan of care  []  Update interventions per flow sheet       []  Discharge due to:_  []  Other:_      Marva Buerger, PT 10/13/2021

## 2021-10-27 ENCOUNTER — HOSPITAL ENCOUNTER (OUTPATIENT)
Dept: PHYSICAL THERAPY | Age: 76
Discharge: HOME | End: 2021-10-27
Payer: MEDICARE

## 2021-10-27 PROCEDURE — 97110 THERAPEUTIC EXERCISES: CPT | Performed by: PHYSICAL THERAPIST

## 2021-10-27 PROCEDURE — 97140 MANUAL THERAPY 1/> REGIONS: CPT | Performed by: PHYSICAL THERAPIST

## 2021-10-27 NOTE — PROGRESS NOTES
PT DAILY TREATMENT NOTE - [facility] 2-15    Patient Name: Codi Roque  Date:10/27/2021  : 1945  [x]  Patient  Verified  Payor: [place] MEDICARE / Plan: [place] MEDICARE PART A & B / Product Type: Medicare /    In time:11:00 A  Out time: 12:10 P  Total Treatment Time (min): 70  Total Timed Codes (min): 60  1:1 Treatment Time ( [address] only): 54   Visit #:  4    Treatment Area: Left knee pain [M25.562]    SUBJECTIVE  Pain Level (0-10 scale): 2-3  Any medication changes, allergies to medications, adverse drug reactions, diagnosis change, or new procedure performed?: [x] No    [] Yes (see summary sheet for update)  Subjective functional status/changes:   [] No changes reported  Pt states she wasn't feeling well last week, so had to cancel her PT appointment.    Her knee is feeling \"okay\" today    OBJECTIVE    Modality rationale: decrease edema, decrease inflammation and decrease pain to improve the patients ability to walk, stand with dec'd symptoms   Min Type Additional Details       [] Estim: []Att   []Unatt    []TENS instruct                  []IFC  []Premod   []NMES                     []Other:  []w/US   []w/ice   []w/heat  Position:  Location:    []  Ultrasound: []Continuous   [] Pulsed                       at: []1MHz   []3MHz Location:  W/cm2:   10 [x]  Ice     []  Heat  []  Ice massage Position:  Location:      []  Vasopneumatic Device  +compression thus reducing edema Pressure:       [] lo [] med [] hi   Temperature:      [x] Skin assessment post-treatment:  [x]intact []redness- no adverse reaction    []redness  adverse reaction:     50 min Therapeutic Exercise:  [x] See flow sheet : progressed per flow sheet   Rationale: increase ROM, increase strength, improve coordination, improve balance and increase proprioception to improve the patients ability to perform ADL's, daily activities with dec'd symptoms    10 min Manual Therapy:  L patellar mobs all directions  STM distal quad, med retinaculum     Rationale: decrease pain, increase ROM and increase tissue extensibility to improve the patients ability to walk, stand, perform ADL's with dec'd symptoms            With   [] TE   [] TA   [] Neuro   [] SC   [] other: Patient Education: [x] Review HEP    [] Progressed/Changed HEP based on:   [] positioning   [] body mechanics   [] transfers   [] heat/ice application    [] other:      Other Objective/Functional Measures: n/a     Pain Level (0-10 scale) post treatment: not captured    ASSESSMENT/Changes in Function:   Dec'd weight on SAQ due to pain with 2.5#. renard session well. Patient will continue to benefit from skilled PT services to modify and progress therapeutic interventions, address functional mobility deficits, address ROM deficits, address strength deficits, analyze and address soft tissue restrictions, analyze and cue movement patterns, analyze and modify body mechanics/ergonomics and assess and modify postural abnormalities to attain remaining goals.      [x]  See Plan of Care  []  See progress note/recertification  []  See Discharge Summary         Progress towards goals / Updated goals:  NT    PLAN  [x]  Upgrade activities as tolerated     [x]  Continue plan of care  []  Update interventions per flow sheet       []  Discharge due to:_  []  Other:_      Aj Jones, PT 10/27/2021

## 2022-03-18 PROBLEM — I65.21 CAROTID STENOSIS, ASYMPTOMATIC, RIGHT: Status: ACTIVE | Noted: 2019-07-01

## 2022-03-20 PROBLEM — I65.21 STENOSIS OF RIGHT CAROTID ARTERY: Status: ACTIVE | Noted: 2019-05-31

## 2022-04-07 ENCOUNTER — OFFICE VISIT (OUTPATIENT)
Dept: ORTHOPEDIC SURGERY | Age: 77
End: 2022-04-07
Payer: MEDICARE

## 2022-04-07 VITALS — WEIGHT: 230 LBS | BODY MASS INDEX: 38.32 KG/M2 | HEIGHT: 65 IN

## 2022-04-07 DIAGNOSIS — M17.12 PRIMARY OSTEOARTHRITIS OF LEFT KNEE: Primary | ICD-10-CM

## 2022-04-07 PROCEDURE — 20610 DRAIN/INJ JOINT/BURSA W/O US: CPT | Performed by: PHYSICIAN ASSISTANT

## 2022-04-07 RX ORDER — TRIAMCINOLONE ACETONIDE 40 MG/ML
40 INJECTION, SUSPENSION INTRA-ARTICULAR; INTRAMUSCULAR ONCE
Status: COMPLETED | OUTPATIENT
Start: 2022-04-07 | End: 2022-04-07

## 2022-04-07 RX ORDER — BUPIVACAINE HYDROCHLORIDE 5 MG/ML
5 INJECTION, SOLUTION PERINEURAL ONCE
Status: COMPLETED | OUTPATIENT
Start: 2022-04-07 | End: 2022-04-07

## 2022-04-07 RX ADMIN — BUPIVACAINE HYDROCHLORIDE 25 MG: 5 INJECTION, SOLUTION PERINEURAL at 16:34

## 2022-04-07 RX ADMIN — TRIAMCINOLONE ACETONIDE 40 MG: 40 INJECTION, SUSPENSION INTRA-ARTICULAR; INTRAMUSCULAR at 16:34

## 2022-04-07 NOTE — PROGRESS NOTES
Vasyl Menchaca (: 1945) is a 78 y.o. female, patient, here for evaluation of the following chief complaint(s):  Knee Pain (left)       SUBJECTIVE/OBJECTIVE:  Vasyl Menchaca presents today complaining of left knee pain. We saw her in 2021. Diagnosed with left knee osteoarthritis. She agreed to a cortisone injection at that visit and formal physical therapy. She does not recall getting much relief from the previous cortisone injection. She is leery of taking so much medication because of what she is already on. She takes Tylenol osteoarthritis and Tylenol PM as needed. Left knee pain is constant. Pain at night when she is trying to fall asleep, but no awakening night pain. She is not sure whether or not her knee limits her activity, or she is in a bit of a post Covid funk of not having much activity. She did physical therapy, but had to reschedule around the holidays and never went back. She is looking forward to getting her pool open. Knee pain located medially without radiation. Denies hip pain. PHYSICAL EXAM:  Vitals: Ht 5' 5\" (1.651 m)   Wt 230 lb (104.3 kg)   BMI 38.27 kg/m²   Body mass index is 38.27 kg/m². 78y.o. year old F in no acute distress. Ambulates with a slight limp on the left. Negative Stinchfield bilaterally. Left knee neutral alignment. Tenderness to palpation along the medial joint line and some laterally as well. No effusion. Range of motion 0-110/115 degrees. Motor 5/5. No instability. No distal edema. IMAGING:  Radiographs: Prior x-rays reviewed in our [facility] which reveal severe loss of medial joint space. Moderate patellofemoral change. ASSESSMENT/PLAN:  1.  Primary osteoarthritis of left knee  -     bupivacaine HCl (MARCAINE) 0.5 % (5 mg/mL) injection 25 mg; 25 mg (5 mL), Other, ONCE, 1 dose, On Thu 22 at 1700  -     triamcinolone acetonide (KENALOG-40) 40 mg/mL injection 40 mg; 40 mg, Intra artICUlar, ONCE, 1 dose, On Thu 4/7/22 at 1700  -     REFERRAL TO PHYSICAL THERAPY    The xray and exam findings were discussed with the patient today. Left knee osteoarthritis, worsening symptoms. Discussed risks/benefits of conservative vs. operative interventions at this time to include NSAIDs, PT, cortisone injections, and surgery. She is hesitant to add anti-inflammatories into her current medication regimen. She will stick with over-the-counter Tylenol osteoarthritis and Tylenol PM.  She elected to proceed with repeat cortisone injection today. She agreed to return to formal physical therapy for strengthening and overall conditioning on [place], and also looks forward to her pool opening in May for some aquatic therapy. She will consider her options including operative intervention and follow-up this summer for either repeat injection or surgical consultation. At that time, repeat left knee x-rays if surgery is to be planned. She will also work on getting a primary care doctor. Hers returned, and she is currently on a wait list for the end of May for a new PCP at her old practice. Discussed risks/benefits of cortisone injection and patient gave verbal consent. Under sterile conditions, the left knee was injected with 5cc 0.5% Bupivacaine and 1cc 40mg Triamcinolone Acetonide (Kenalog) intra-articularly, tolerated the procedure well. Return if symptoms worsen or fail to improve. Review Of Systems  ROS     Positive for: Musculoskeletal    Last edited by Carlin Acosta RN on 4/7/2022  3:56 PM. (History)         Patient denies any recent fever, chills, nausea, vomiting, chest pain, or shortness of breath. Allergies   Allergen Reactions    Ace Inhibitors Cough    Thiazides Photosensitivity       Current Outpatient Medications   Medication Sig    aspirin delayed-release 81 mg tablet Take 1 Tablet by mouth daily. FUTURE REFILLS MUST COME FROM NEW PCP - KATIE SKIFF NO LONGER AT PRACTICE.     atorvastatin (LIPITOR) 40 mg tablet TAKE 1 TABLET BY MOUTH EVERY EVENING    amLODIPine (NORVASC) 5 mg tablet TAKE 1 TABLET BY MOUTH EVERY EVENING    losartan (COZAAR) 100 mg tablet TAKE 1 TABLET BY MOUTH EVERY EVENING    cholecalciferol (VITAMIN D3) (2,000 UNITS /50 MCG) cap capsule Take 2,000 Units by mouth two (2) times a day.  acetaminophen (TYLENOL ARTHRITIS PAIN) 650 mg TbER Take 650 mg by mouth every six to eight (6-8) hours as needed.  acetaminophen/diphenhydramine (TYLENOL PM PO) Take  by mouth nightly as needed.  VIT C/E/ZN/COPPR/LUTEIN/ZEAXAN (PRESERVISION AREDS 2 PO) Take 1 Tab by mouth two (2) times daily (with meals).  multivitamin (ONE A DAY) tablet Take 1 Tab by mouth daily. No current facility-administered medications for this visit.        Past Medical History:   Diagnosis Date    Arrhythmia     HEART PALPITATIONS OCCASIONALLY    Arthritis     Chronic pain 2016    LEFT KNEE    Colon polyps     hyperplastic    Hypercholesterolemia     Hypertension     Nausea & vomiting         Past Surgical History:   Procedure Laterality Date    ENDOSCOPY, COLON, DIAGNOSTIC  8/2009    HX CAROTID ENDARTERECTOMY Right 07/01/2019    Dr. Serjio Dee    HX HEENT      T&A (childhood)    HX HERNIA REPAIR      umbilical    HX ORTHOPAEDIC Left 2016    MENISCUS REPAIR    HX ORTHOPAEDIC Right     JOINT SURGERY ON MIDDLE FINGER TO REMOVE A GROWTH    HX TUBAL LIGATION  1990       Family History   Problem Relation Age of Onset    Macular Degen Mother     OSTEOARTHRITIS Mother     Osteoporosis Mother     Other Mother         tremor    Cancer Mother         Esophageal    Parkinsonism Father     Stroke Father         CVA (42's)   [facility] Bipolar Disorder Sister     No Known Problems Sister     No Known Problems Daughter     Attention Deficit Hyperactivity Disorder Daughter     No Known Problems Daughter     Anesth Problems Neg Hx         Social History     Socioeconomic History    Marital status:      Spouse name: Not on file    Number of children: Not on file    Years of education: Not on file    Highest education level: Not on file   Occupational History    Not on file   Tobacco Use    Smoking status: Never Smoker    Smokeless tobacco: Never Used   Substance and Sexual Activity    Alcohol use: Yes     Alcohol/week: 9.0 standard drinks     Types: 4 Shots of liquor, 5 Standard drinks or equivalent per week     Comment: socially    Drug use: No    Sexual activity: Not Currently     Partners: Male   Other Topics Concern     Service Not Asked    Blood Transfusions Not Asked    Caffeine Concern Not Asked    Occupational Exposure Not Asked    Hobby Hazards Not Asked    Sleep Concern Yes    Stress Concern Yes    Weight Concern Yes    Special Diet No    Back Care Not Asked    Exercise Not Asked    Bike Helmet Not Asked    Seat Belt Not Asked    Self-Exams Not Asked   Social History Narrative    Not on file     Social Determinants of Health     Financial Resource Strain:     Difficulty of Paying Living Expenses: Not on file   Food Insecurity:     Worried About Running Out of Food in the Last Year: Not on file    Shlomo of Food in the Last Year: Not on file   Transportation Needs:     Lack of Transportation (Medical): Not on file    Lack of Transportation (Non-Medical):  Not on file   Physical Activity:     Days of Exercise per Week: Not on file    Minutes of Exercise per Session: Not on file   Stress:     Feeling of Stress : Not on file   Social Connections:     Frequency of Communication with Friends and Family: Not on file    Frequency of Social Gatherings with Friends and Family: Not on file    Attends [employer] Services: Not on file    Active Member of Clubs or Organizations: Not on file    Attends Club or Organization Meetings: Not on file    Marital Status: Not on file   Intimate Partner Violence:     Fear of Current or Ex-Partner: Not on file    Emotionally Abused: Not on file    Physically Abused: Not on file    Sexually Abused: Not on file   Housing Stability:     Unable to Pay for Housing in the Last Year: Not on file    Number of Places Lived in the Last Year: Not on file    Unstable Housing in the Last Year: Not on file       Orders Placed This [facility]     Referral Priority:   Routine     Referral Type:   PT/OT/ST     Referral Reason:   Specialty Services Required     Number of Visits Requested:   1    bupivacaine HCl (MARCAINE) 0.5 % (5 mg/mL) injection 25 mg    triamcinolone acetonide (KENALOG-40) 40 mg/mL injection 40 mg      Goran Samson M.D. was available for immediate consultation as the supervising physician. An electronic signature was used to authenticate this note.   -- Jennyfer Joel PA-C

## 2022-04-07 NOTE — Clinical Note
4/7/2022    Patient: Samantha Manley   YOB: 1945   Date of Visit: 4/7/2022     Shiloh Licona NP  Via     Dear Shiolh Licona NP,      Thank you for referring Ms. Shannon Baird to [facility] for evaluation. My notes for this consultation are attached. If you have questions, please do not hesitate to call me. I look forward to following your patient along with you.       Sincerely,    Sara Huertas MD

## 2022-06-20 ENCOUNTER — OFFICE VISIT (OUTPATIENT)
Dept: INTERNAL MEDICINE CLINIC | Age: 77
End: 2022-06-20
Payer: MEDICARE

## 2022-06-20 VITALS
SYSTOLIC BLOOD PRESSURE: 125 MMHG | WEIGHT: 218.4 LBS | HEART RATE: 71 BPM | RESPIRATION RATE: 16 BRPM | OXYGEN SATURATION: 98 % | DIASTOLIC BLOOD PRESSURE: 74 MMHG | BODY MASS INDEX: 36.39 KG/M2 | HEIGHT: 65 IN | TEMPERATURE: 98 F

## 2022-06-20 DIAGNOSIS — E78.00 HYPERCHOLESTEREMIA: ICD-10-CM

## 2022-06-20 DIAGNOSIS — I10 BENIGN ESSENTIAL HYPERTENSION: ICD-10-CM

## 2022-06-20 DIAGNOSIS — E66.9 OBESITY (BMI 35.0-39.9 WITHOUT COMORBIDITY): ICD-10-CM

## 2022-06-20 DIAGNOSIS — G89.29 CHRONIC RIGHT HIP PAIN: ICD-10-CM

## 2022-06-20 DIAGNOSIS — Z12.31 SCREENING MAMMOGRAM FOR BREAST CANCER: ICD-10-CM

## 2022-06-20 DIAGNOSIS — Z86.010 PERSONAL HISTORY OF COLONIC POLYPS: ICD-10-CM

## 2022-06-20 DIAGNOSIS — R41.3 MEMORY CHANGE: ICD-10-CM

## 2022-06-20 DIAGNOSIS — R73.03 PREDIABETES: ICD-10-CM

## 2022-06-20 DIAGNOSIS — I65.21 CAROTID STENOSIS, ASYMPTOMATIC, RIGHT: ICD-10-CM

## 2022-06-20 DIAGNOSIS — M25.551 CHRONIC RIGHT HIP PAIN: ICD-10-CM

## 2022-06-20 DIAGNOSIS — G89.29 CHRONIC RIGHT-SIDED THORACIC BACK PAIN: ICD-10-CM

## 2022-06-20 DIAGNOSIS — M54.6 CHRONIC RIGHT-SIDED THORACIC BACK PAIN: ICD-10-CM

## 2022-06-20 PROCEDURE — G0463 HOSPITAL OUTPT CLINIC VISIT: HCPCS | Performed by: INTERNAL MEDICINE

## 2022-06-20 PROCEDURE — 99215 OFFICE O/P EST HI 40 MIN: CPT | Performed by: INTERNAL MEDICINE

## 2022-06-20 RX ORDER — LOSARTAN POTASSIUM 100 MG/1
100 TABLET ORAL EVERY EVENING
Qty: 90 TABLET | Refills: 1 | Status: SHIPPED | OUTPATIENT
Start: 2022-06-20

## 2022-06-20 RX ORDER — ATORVASTATIN CALCIUM 40 MG/1
40 TABLET, FILM COATED ORAL EVERY EVENING
Qty: 90 TABLET | Refills: 3 | Status: SHIPPED | OUTPATIENT
Start: 2022-06-20 | End: 2022-06-23 | Stop reason: SDUPTHER

## 2022-06-20 RX ORDER — AMLODIPINE BESYLATE 5 MG/1
5 TABLET ORAL EVERY EVENING
Qty: 90 TABLET | Refills: 1 | Status: SHIPPED | OUTPATIENT
Start: 2022-06-20

## 2022-06-20 NOTE — ASSESSMENT & PLAN NOTE
Lab Results   Component Value Date/Time    LDL, calculated 102 (H) 06/23/2021 07:16 AM    LDL, calculated 87 12/26/2019 07:27 AM     Will reassess fasting lipid panel this year  She is complaint with statin therapy

## 2022-06-20 NOTE — ASSESSMENT & PLAN NOTE
Controlled with current regimen, plan to continue  Advised on home monitoring of BP and keep a log  Will check labs before follow up for Annual Wellness visit

## 2022-06-20 NOTE — PROGRESS NOTES
6/20/2022    Jarrelllucila [facility] 1945 is a 78y.o. year old female established patient,   here for evaluation of the following chief complaint(s):  Chief Complaint   Patient presents with    Establish Care    Back Pain     right    Memory Loss           ASSESSMENT/PLAN:  Below is the assessment and plan developed based on review of pertinent history, physical exam, labs, studies, and medications. 1. Benign essential hypertension  Assessment & Plan:  Controlled with current regimen, plan to continue  Advised on home monitoring of BP and keep a log  Will check labs before follow up for Annual Wellness visit  Orders:  -     amLODIPine (NORVASC) 5 mg tablet; Take 1 Tablet by mouth every evening., Normal, Disp-90 Tablet, R-1  -     losartan (COZAAR) 100 mg tablet; Take 1 Tablet by mouth every evening., Normal, Disp-90 Tablet, R-1  -     METABOLIC PANEL, COMPREHENSIVE; Future  -     MICROALBUMIN, UR, RAND W/ MICROALB/CREAT RATIO; Future  2. Hypercholesteremia  Assessment & Plan:  Lab Results   Component Value Date/Time    LDL, calculated 102 (H) 06/23/2021 07:16 AM    LDL, calculated 87 12/26/2019 07:27 AM     Will reassess fasting lipid panel this year  She is complaint with statin therapy  Orders:  -     atorvastatin (LIPITOR) 40 mg tablet; Take 1 Tablet by mouth every evening., Normal, Disp-90 Tablet, R-3  -     LIPID PANEL; Future  3. Carotid stenosis, asymptomatic, right  Assessment & Plan:   S/p R carotid endarterectomy 2019, Dr Cezar Arango checkup and US, everything looks good  Continue statin therapy, baby asa  4. Chronic right-sided thoracic back pain    Will check Xrays to better eval her pains, consider PT vs ortho referral pending results  -     XR SPINE THORAC 3 V; Future  -     REFERRAL TO PHYSICAL THERAPY; Future  5. Chronic right hip pain  -     XR HIP RT W OR WO PELV 2-3 VWS; Future  -     REFERRAL TO PHYSICAL THERAPY; Future  6.  Prediabetes  Assessment & Plan:   Has maintained A1C under 6 in recent years, will continue regular monitoring  Orders:  -     HEMOGLOBIN A1C WITH EAG; Future  7. Memory change      Mini mental today for baseline was 28      We discussed red flag signs for dementia and when further eval may be warranted  9. Screening mammogram for breast cancer  -     [facility] MAMMO BI SCREENING INCL CAD; Future  10. Personal history of colonic polyps  Assessment & Plan:  Reports most recent 12?2019, 5 year f/u, will get the records forwarded  11. Obesity (BMI 35.0-39.9 without comorbidity)  Assessment & Plan:  Discussed exercise strategies, she has a pool at home but needs to work on building it into her routine         Follow-up and Dispositions    · Return in about 2 months (around 8/20/2022) for medicare wellness. SUBJECTIVE/OBJECTIVE:  New patient to me, here to establish care. She has history of hypertension, well controlled on current regimen. Also on statin therapy for cholesterol and history of carotid artherosclerosis. S/p endarterectomy on the right in 2019 and has done well with that. Complaint with baby asa as well. Has some orthopedic concerns recently- Mid back pain on the right side, acute on chronic issue. Also some right anterior hip pain. Has seen ortho for left knee arthritis which is advanced. Tried injections as well as PT before but didn't have much benefit  She has some concerns about her memory. For example she will forget why she went to another room or what she needed to get somewhere. She was driving to a new location recently that she thought she should be able to navigate but wasn't able to find it. No trouble with driving to familiar locations. No expressed concerns from friends or family about her memory. Review of Systems   Constitutional: Negative for chills and fever. Respiratory: Negative for cough and shortness of breath. Cardiovascular: Negative for chest pain, palpitations and leg swelling.    Gastrointestinal: Negative for abdominal pain. Musculoskeletal: Positive for back pain and joint pain. Negative for falls. Skin: Negative for rash. Neurological: Negative for dizziness and headaches. Psychiatric/Behavioral: Positive for memory loss (minor). Negative for depression. Physical Exam  Constitutional:       General: She is not in acute distress. Appearance: Normal appearance. She is not ill-appearing. HENT:      Head: Normocephalic and atraumatic. Eyes:      Conjunctiva/sclera: Conjunctivae normal.   Cardiovascular:      Rate and Rhythm: Normal rate and regular rhythm. Heart sounds: No murmur heard. Pulmonary:      Effort: Pulmonary effort is normal.      Breath sounds: Normal breath sounds. No wheezing. Musculoskeletal:      Right lower leg: No edema. Left lower leg: No edema. Skin:     General: Skin is warm and dry. Neurological:      General: No focal deficit present. Mental Status: She is alert and oriented to person, place, and time. Mental status is at baseline. Psychiatric:         Mood and Affect: Mood normal.         Thought Content: Thought content normal.         Judgment: Judgment normal.          Vitals:    06/20/22 1031   BP: 125/74   Pulse: 71   Resp: 16   Temp: 98 °F (36.7 °C)   TempSrc: Temporal   SpO2: 98%   Weight: 218 lb 6.4 oz (99.1 kg)   Height: 5' 4.88\" (1.648 m)        The following sections were reviewed & updated as appropriate: Problem List, Allergies, PMH, PSH, FH, and SH.     Patient Active Problem List   Diagnosis Code    Benign essential hypertension I10    Personal history of colonic polyps Z86.010    Hypercholesteremia E78.00    Obesity (BMI 35.0-39.9 without comorbidity) E66.9    Prediabetes R73.03    H/O bone density study Z92.89    Carotid stenosis, asymptomatic, right I65.21    Chronic right-sided thoracic back pain M54.6, G89.29    Memory change R41.3        Current Outpatient Medications   Medication Sig Dispense Refill    amLODIPine (NORVASC) 5 mg tablet Take 1 Tablet by mouth every evening. 90 Tablet 1    losartan (COZAAR) 100 mg tablet Take 1 Tablet by mouth every evening. 90 Tablet 1    atorvastatin (LIPITOR) 40 mg tablet Take 1 Tablet by mouth every evening. 90 Tablet 3    aspirin delayed-release 81 mg tablet Take 1 Tablet by mouth daily. FUTURE REFILLS MUST COME FROM NEW PCP - KATIE SKIFF NO LONGER AT PRACTICE. 90 Tablet 0    cholecalciferol (VITAMIN D3) (2,000 UNITS /50 MCG) cap capsule Take 2,000 Units by mouth two (2) times a day.  acetaminophen (TYLENOL ARTHRITIS PAIN) 650 mg TbER Take 650 mg by mouth every six to eight (6-8) hours as needed.  acetaminophen/diphenhydramine (TYLENOL PM PO) Take  by mouth nightly as needed.  VIT C/E/ZN/COPPR/LUTEIN/ZEAXAN (PRESERVISION AREDS 2 PO) Take 1 Tab by mouth two (2) times daily (with meals).  multivitamin (ONE A DAY) tablet Take 1 Tab by mouth daily. Ace inhibitors and Thiazides    Social History     Occupational History    Not on file   Tobacco Use    Smoking status: Never Smoker    Smokeless tobacco: Never Used   Vaping Use    Vaping Use: Never used   Substance and Sexual Activity    Alcohol use: Yes     Alcohol/week: 9.0 standard drinks     Types: 4 Shots of liquor, 5 Standard drinks or equivalent per week     Comment: socially    Drug use: No    Sexual activity: Not Currently     Partners: Male        On this date 06/20/2022 I have spent 45 minutes reviewing previous notes, test results and face to face with the patient discussing the diagnosis and importance of compliance with the treatment plan as well as documenting on the day of the visit. Disclaimer:  Aspects of this note may have been generated using Dragon voice recognition software. Despite editing, there may be some syntax errors   We discussed the expected course, resolution and complications of the diagnosis(es) in detail.   I have discussed any significant medication side effects and warnings with the patient when indicated. I advised them to contact the office if their condition worsens, changes or fails to improve as anticipated. Patient expressed understanding of the diagnosis and plan. An electronic signature was used to authenticate this note.   -- Jonathon Blank MD

## 2022-06-20 NOTE — ASSESSMENT & PLAN NOTE
S/p R carotid endarterectomy 2019, Dr Girish Breaux checkup and [address], everything looks good  Continue statin therapy, baby asa

## 2022-06-20 NOTE — ASSESSMENT & PLAN NOTE
Discussed exercise strategies, she has a pool at home but needs to work on building it into her routine

## 2022-06-20 NOTE — ASSESSMENT & PLAN NOTE
Mini mental today for baseline was 28  We discussed red flag signs for dementia and when further eval may be warranted

## 2022-06-22 ENCOUNTER — HOSPITAL ENCOUNTER (OUTPATIENT)
Dept: GENERAL RADIOLOGY | Age: 77
Discharge: HOME | End: 2022-06-22
Attending: INTERNAL MEDICINE
Payer: MEDICARE

## 2022-06-22 ENCOUNTER — HOSPITAL ENCOUNTER (OUTPATIENT)
Dept: MAMMOGRAPHY | Age: 77
Discharge: HOME | End: 2022-06-22
Attending: INTERNAL MEDICINE
Payer: MEDICARE

## 2022-06-22 DIAGNOSIS — G89.29 CHRONIC RIGHT HIP PAIN: ICD-10-CM

## 2022-06-22 DIAGNOSIS — G89.29 CHRONIC RIGHT-SIDED THORACIC BACK PAIN: ICD-10-CM

## 2022-06-22 DIAGNOSIS — Z12.31 SCREENING MAMMOGRAM FOR BREAST CANCER: ICD-10-CM

## 2022-06-22 DIAGNOSIS — M25.551 CHRONIC RIGHT HIP PAIN: ICD-10-CM

## 2022-06-22 DIAGNOSIS — M54.6 CHRONIC RIGHT-SIDED THORACIC BACK PAIN: ICD-10-CM

## 2022-06-22 LAB
ALBUMIN SERPL-MCNC: 4.6 G/DL (ref 3.7–4.7)
ALBUMIN/CREAT UR: 12 MG/G CREAT (ref 0–29)
ALBUMIN/GLOB SERPL: 1.6 {RATIO} (ref 1.2–2.2)
ALP SERPL-CCNC: 94 IU/L (ref 44–121)
ALT SERPL-CCNC: 21 IU/L (ref 0–32)
AST SERPL-CCNC: 20 IU/L (ref 0–40)
BILIRUB SERPL-MCNC: 0.7 MG/DL (ref 0–1.2)
BUN SERPL-MCNC: 18 MG/DL (ref 8–27)
BUN/CREAT SERPL: 26 (ref 12–28)
CALCIUM SERPL-MCNC: 9.4 MG/DL (ref 8.7–10.3)
CHLORIDE SERPL-SCNC: 107 MMOL/L (ref 96–106)
CHOLEST SERPL-MCNC: 192 MG/DL (ref 100–199)
CO2 SERPL-SCNC: 20 MMOL/L (ref 20–29)
CREAT SERPL-MCNC: 0.69 MG/DL (ref 0.57–1)
CREAT UR-MCNC: 96.9 MG/DL
EGFR: 91 ML/MIN/1.73
EST. AVERAGE GLUCOSE BLD GHB EST-MCNC: 117 MG/DL
GLOBULIN SER CALC-MCNC: 2.8 G/DL (ref 1.5–4.5)
GLUCOSE SERPL-MCNC: 119 MG/DL (ref 65–99)
HBA1C MFR BLD: 5.7 % (ref 4.8–5.6)
HDLC SERPL-MCNC: 58 MG/DL
LDLC SERPL CALC-MCNC: 115 MG/DL (ref 0–99)
MICROALBUMIN UR-MCNC: 11.8 UG/ML
POTASSIUM SERPL-SCNC: 4.5 MMOL/L (ref 3.5–5.2)
PROT SERPL-MCNC: 7.4 G/DL (ref 6–8.5)
SODIUM SERPL-SCNC: 144 MMOL/L (ref 134–144)
TRIGL SERPL-MCNC: 104 MG/DL (ref 0–149)
VLDLC SERPL CALC-MCNC: 19 MG/DL (ref 5–40)

## 2022-06-22 PROCEDURE — 72072 X-RAY EXAM THORAC SPINE 3VWS: CPT

## 2022-06-22 PROCEDURE — 77067 SCR MAMMO BI INCL CAD: CPT

## 2022-06-22 PROCEDURE — 73502 X-RAY EXAM HIP UNI 2-3 VIEWS: CPT

## 2022-06-23 DIAGNOSIS — E78.00 HYPERCHOLESTEREMIA: Primary | ICD-10-CM

## 2022-06-23 RX ORDER — ATORVASTATIN CALCIUM 40 MG/1
80 TABLET, FILM COATED ORAL EVERY EVENING
Qty: 90 TABLET | Refills: 3
Start: 2022-06-23 | End: 2022-09-01

## 2022-08-30 LAB
ALT SERPL-CCNC: 40 IU/L (ref 0–32)
AST SERPL-CCNC: 33 IU/L (ref 0–40)
CHOLEST SERPL-MCNC: 197 MG/DL (ref 100–199)
HDLC SERPL-MCNC: 54 MG/DL
LDLC SERPL CALC-MCNC: 117 MG/DL (ref 0–99)
TRIGL SERPL-MCNC: 147 MG/DL (ref 0–149)
VLDLC SERPL CALC-MCNC: 26 MG/DL (ref 5–40)

## 2022-09-01 ENCOUNTER — OFFICE VISIT (OUTPATIENT)
Dept: INTERNAL MEDICINE CLINIC | Age: 77
End: 2022-09-01
Payer: MEDICARE

## 2022-09-01 VITALS
BODY MASS INDEX: 38.24 KG/M2 | RESPIRATION RATE: 16 BRPM | DIASTOLIC BLOOD PRESSURE: 74 MMHG | SYSTOLIC BLOOD PRESSURE: 140 MMHG | HEIGHT: 64 IN | HEART RATE: 68 BPM | OXYGEN SATURATION: 97 % | TEMPERATURE: 97.8 F | WEIGHT: 224 LBS

## 2022-09-01 DIAGNOSIS — R73.03 PREDIABETES: ICD-10-CM

## 2022-09-01 DIAGNOSIS — I10 BENIGN ESSENTIAL HYPERTENSION: ICD-10-CM

## 2022-09-01 DIAGNOSIS — E66.9 OBESITY (BMI 35.0-39.9 WITHOUT COMORBIDITY): ICD-10-CM

## 2022-09-01 DIAGNOSIS — E78.00 HYPERCHOLESTEREMIA: ICD-10-CM

## 2022-09-01 DIAGNOSIS — Z00.00 ENCOUNTER FOR SUBSEQUENT ANNUAL WELLNESS VISIT (AWV) IN MEDICARE PATIENT: Primary | ICD-10-CM

## 2022-09-01 PROCEDURE — G8399 PT W/DXA RESULTS DOCUMENT: HCPCS | Performed by: INTERNAL MEDICINE

## 2022-09-01 PROCEDURE — 99214 OFFICE O/P EST MOD 30 MIN: CPT | Performed by: INTERNAL MEDICINE

## 2022-09-01 PROCEDURE — 1090F PRES/ABSN URINE INCON ASSESS: CPT | Performed by: INTERNAL MEDICINE

## 2022-09-01 PROCEDURE — G9899 SCRN MAM PERF RSLTS DOC: HCPCS | Performed by: INTERNAL MEDICINE

## 2022-09-01 PROCEDURE — G0439 PPPS, SUBSEQ VISIT: HCPCS | Performed by: INTERNAL MEDICINE

## 2022-09-01 PROCEDURE — G0463 HOSPITAL OUTPT CLINIC VISIT: HCPCS | Performed by: INTERNAL MEDICINE

## 2022-09-01 PROCEDURE — G8427 DOCREV CUR MEDS BY ELIG CLIN: HCPCS | Performed by: INTERNAL MEDICINE

## 2022-09-01 PROCEDURE — G8754 DIAS BP LESS 90: HCPCS | Performed by: INTERNAL MEDICINE

## 2022-09-01 PROCEDURE — G8417 CALC BMI ABV UP PARAM F/U: HCPCS | Performed by: INTERNAL MEDICINE

## 2022-09-01 PROCEDURE — G8510 SCR DEP NEG, NO PLAN REQD: HCPCS | Performed by: INTERNAL MEDICINE

## 2022-09-01 PROCEDURE — G8753 SYS BP > OR = 140: HCPCS | Performed by: INTERNAL MEDICINE

## 2022-09-01 PROCEDURE — 3017F COLORECTAL CA SCREEN DOC REV: CPT | Performed by: INTERNAL MEDICINE

## 2022-09-01 PROCEDURE — 1101F PT FALLS ASSESS-DOCD LE1/YR: CPT | Performed by: INTERNAL MEDICINE

## 2022-09-01 PROCEDURE — G8536 NO DOC ELDER MAL SCRN: HCPCS | Performed by: INTERNAL MEDICINE

## 2022-09-01 RX ORDER — ROSUVASTATIN CALCIUM 20 MG/1
20 TABLET, COATED ORAL
Qty: 90 TABLET | Refills: 0 | Status: SHIPPED | OUTPATIENT
Start: 2022-09-01

## 2022-09-01 RX ORDER — METFORMIN HYDROCHLORIDE 500 MG/1
500 TABLET, EXTENDED RELEASE ORAL 2 TIMES DAILY WITH MEALS
Qty: 180 TABLET | Refills: 1 | Status: SHIPPED | OUTPATIENT
Start: 2022-09-01

## 2022-09-01 NOTE — PROGRESS NOTES
9/1/2022     Celeste Gil is here today for    Annual Wellness Visit- Subsequent Visit    Assessment & Plan:   Below is the assessment and plan developed based on review of pertinent history, physical exam, labs, studies, and medications. 1. Encounter for subsequent annual wellness visit (AWV) in Medicare patient  2. Hypercholesteremia  Assessment & Plan:  Lab Results   Component Value Date/Time    LDL, calculated 117 (H) 08/29/2022 07:12 AM    LDL, calculated 87 12/26/2019 07:27 AM     No improvement with atorvastatin 40->80mg  Suggest trying crestor instead    Orders:  -     rosuvastatin (CRESTOR) 20 mg tablet; Take 1 Tablet by mouth nightly., Normal, Disp-90 Tablet, R-0  -     LIPID PANEL; Future  -     AST; Future  -     ALT; Future  3. Benign essential hypertension  Assessment & Plan:  Slightly above goal today  Advised on home monitoring of BP and keep a log   4. Prediabetes  Assessment & Plan:   Has maintained A1C under 6 in recent years, will continue regular monitoring  Discussed diet and exercise strategies and adding metformin  Orders:  -     metFORMIN ER (GLUCOPHAGE XR) 500 mg tablet; Take 1 Tablet by mouth two (2) times daily (with meals). , Normal, Disp-180 Tablet, R-1  5. Obesity (BMI 35.0-39.9 without comorbidity)  Assessment & Plan:  She is hoping to lose weight for knee surgery, wonders about phentermine  I feel she would be higher risk for this and suggest trying metformin first     Jaw pain- to see dentist, can do short course NSAID for suspected TMJ    Follow-up and Dispositions    Return in about 3 months (around 12/1/2022). Subjective: In addition to AWV, we followed up on chronic conditions as detailed above    Additional concerns: jaw pain, cholesterol med, left knee pain    Diet and exercise habits:inactive, hoping to lose weight for knee surgery    End of Life Planning: This was discussed with her today and she has an advanced directive - a copy has been provided. Reviewed DNR/DNI and patient is no. Depression Screen:  3 most recent PHQ Screens 9/1/2022   Little interest or pleasure in doing things Not at all   Feeling down, depressed, irritable, or hopeless Not at all   Total Score PHQ 2 0         Fall Risk:   Fall Risk Assessment, last 12 mths 9/1/2022   Able to walk? Yes   Fall in past 12 months? 0   Do you feel unsteady? 0   Are you worried about falling 0   Number of falls in past 12 months -   Fall with injury? -       Abuse Screen:  Abuse Screening Questionnaire 5/20/2019   Do you ever feel afraid of your partner? N   Are you in a relationship with someone who physically or mentally threatens you? N   Is it safe for you to go home? Y       Do you average more than 1 drink per night or more than 7 drinks a week:  No    On any one occasion in the past three months have you have had more than 3 drinks containing alcohol:  No            Functional Ability and Level of Safety:    Hearing: Hearing is good. Cognition Screen:   Has your family/caregiver stated any concerns about your memory: no    Cognitive Screening: Normal - MMSE (Mini Mental Status Exam) 28 6/20/22     Ambulation: with mild difficulty     Activities of Daily Living: The home contains: no safety equipment. Patient does total self care        Adult Nutrition Screen:  No risk factors noted.      Health Maintenance:     Health Maintenance   Topic Date Due    COVID-19 Vaccine (4 - Booster for Moderna series) 02/27/2022    Flu Vaccine (1) 09/01/2022    A1C test (Diabetic or Prediabetic)  06/21/2023    Lipid Screen  08/29/2023    Depression Screen  09/01/2023    Medicare Yearly Exam  09/02/2023    Colorectal Cancer Screening Combo  10/10/2023    Breast Cancer Screen Mammogram  06/22/2024    DTaP/Tdap/Td series (2 - Td or Tdap) 05/21/2028    Hepatitis C Screening  Completed    Bone Densitometry (Dexa) Screening  Completed    Shingrix Vaccine Age 50>  Completed    Pneumococcal 65+ years  Completed Immunization History   Administered Date(s) Administered    COVID-19, MODERNA BLUE border, Primary or Immunocompromised, (age 18y+), IM, 100 mcg/0.5mL 01/29/2021, 02/26/2021, 10/27/2021    Influenza High Dose Vaccine PF 10/28/2015, 09/28/2017, 08/31/2018, 09/27/2018, 09/06/2019    Influenza Vaccine 09/01/2013, 12/30/2014    Pneumococcal Conjugate (PCV-13) 06/07/2016    Pneumococcal Polysaccharide (PPSV-23) 10/10/2013    Tdap 05/21/2018    Zoster Recombinant 08/01/2018, 09/27/2018        Immunizations:   Covid: up to date  Influenza: will plan on getting it this fall  Tetanus: up to date  Shingles: up to date    Cancer screening:   Cervical: reviewed guidelines, n/a  Breast: reviewed guidelines, up to date  Colon: reviewed guidelines, up to date       Objective:   Physical Exam  Constitutional:       Appearance: Normal appearance. She is not ill-appearing. HENT:      Head: Normocephalic and atraumatic. Right Ear: Tympanic membrane, ear canal and external ear normal. There is no impacted cerumen. Left Ear: Tympanic membrane, ear canal and external ear normal. There is no impacted cerumen. Nose: Nose normal. No congestion. Mouth/Throat:      Mouth: Mucous membranes are moist.      Pharynx: Oropharynx is clear. No oropharyngeal exudate. Eyes:      Conjunctiva/sclera: Conjunctivae normal.      Pupils: Pupils are equal, round, and reactive to light. Cardiovascular:      Rate and Rhythm: Normal rate and regular rhythm. Heart sounds: No murmur heard. Pulmonary:      Effort: Pulmonary effort is normal. No respiratory distress. Breath sounds: Normal breath sounds. No wheezing. Musculoskeletal:      Cervical back: Normal range of motion and neck supple. Right lower leg: No edema. Left lower leg: No edema. Lymphadenopathy:      Cervical: No cervical adenopathy. Skin:     General: Skin is warm. Neurological:      General: No focal deficit present.       Mental Status: She is alert and oriented to person, place, and time. Mental status is at baseline. Gait: Gait normal.   Psychiatric:         Mood and Affect: Mood normal.         Thought Content: Thought content normal.         Judgment: Judgment normal.        Vitals:    09/01/22 1544 09/01/22 1615   BP: (!) 146/69 (!) 140/74   Pulse: 68    Resp: 16    Temp: 97.8 °F (36.6 °C)    TempSrc: Temporal    SpO2: 97%    Weight: 224 lb (101.6 kg)    Height: 5' 4\" (1.626 m)        Patient Care Team:  Larry Elder MD as PCP - General (Internal Medicine Physician)  Larry Elder MD as PCP - [facility] Empaneled Provider  Aristides Zamora MD (Vascular Surgery)  Angel Arnold MD (Orthopedic Surgery)        Review of Systems   Constitutional:  Negative for chills and fever. HENT:  Positive for hearing loss. Respiratory:  Negative for cough and shortness of breath. Cardiovascular:  Negative for chest pain, palpitations and leg swelling. Gastrointestinal:  Negative for abdominal pain. Musculoskeletal:  Positive for joint pain. Negative for falls. Skin:  Negative for rash. Neurological:  Negative for dizziness. Psychiatric/Behavioral:  The patient is nervous/anxious (family stress, thinking about counseling). The following sections were reviewed & updated as appropriate: Problem List, Allergies, PMH, PSH, FH, and SH.     Patient Active Problem List   Diagnosis Code    Benign essential hypertension I10    Personal history of colonic polyps Z86.010    Hypercholesteremia E78.00    Obesity (BMI 35.0-39.9 without comorbidity) E66.9    Prediabetes R73.03    H/O bone density study Z92.89    Severe obesity (HCC) E66.01    Carotid stenosis, asymptomatic, right I65.21    Chronic right-sided thoracic back pain M54.6, G89.29    Memory change R41.3    Arthritis of knee M17.10       Ace inhibitors and Thiazides    Social History     Occupational History    Not on file   Tobacco Use    Smoking status: Never Smokeless tobacco: Never   Vaping Use    Vaping Use: Never used   Substance and Sexual Activity    Alcohol use: Yes     Alcohol/week: 9.0 standard drinks     Types: 4 Shots of liquor, 5 Standard drinks or equivalent per week     Comment: socially    Drug use: No    Sexual activity: Not Currently     Partners: Male        Current Outpatient Medications   Medication Instructions    acetaminophen (TYLENOL) 650 mg, Oral, EVERY 6-8 HOURS PRN    acetaminophen/diphenhydramine (TYLENOL PM PO) Oral, BEDTIME PRN    amLODIPine (NORVASC) 5 mg, Oral, EVERY EVENING    aspirin delayed-release 81 mg, Oral, DAILY, FUTURE REFILLS MUST COME FROM NEW PCP - KATIE SKIFF NO LONGER AT PRACTICE. cholecalciferol (VITAMIN D3) 2,000 Units, Oral, 2 TIMES DAILY    losartan (COZAAR) 100 mg, Oral, EVERY EVENING    metFORMIN ER (GLUCOPHAGE XR) 500 mg, Oral, 2 TIMES DAILY WITH MEALS    multivitamin (ONE A DAY) tablet 1 Tablet, DAILY    rosuvastatin (CRESTOR) 20 mg, Oral, EVERY BEDTIME    VIT C/E/ZN/COPPR/LUTEIN/ZEAXAN (PRESERVISION AREDS 2 PO) 1 Tablet, Oral, 2 TIMES DAILY WITH MEALS       Disclaimer:  Aspects of this note may have been generated using Dragon voice recognition software. Despite editing, there may be some syntax errors   We discussed the expected course, resolution and complications of the diagnosis(es) in detail. I have discussed any significant medication side effects and warnings with the patient when indicated. I advised them to contact the office if their condition worsens, changes or fails to improve as anticipated. Patient expressed understanding of the diagnosis and plan. An electronic signature was used to authenticate this note.   -- Narendra Mccallum MD

## 2022-09-02 PROBLEM — M17.10 ARTHRITIS OF KNEE: Status: ACTIVE | Noted: 2022-09-02

## 2022-09-02 PROBLEM — E66.01 SEVERE OBESITY (HCC): Status: ACTIVE | Noted: 2018-11-19

## 2022-09-02 NOTE — PATIENT INSTRUCTIONS
The best way to stay healthy is to live a healthy lifestyle. A healthy lifestyle includes regular exercise, eating a well-balanced diet, keeping a healthy weight and not smoking. Regular physical exams and screening tests are another important way to take care of yourself. Preventive exams provided by health care providers can find health problems early when treatment works best and can keep you from getting certain diseases or illnesses. Preventive services include exams, lab tests, screenings, shots, monitoring and information to help you take care of your own health. All people over 65 should have a pneumonia shot. Pneumonia shots are usually only needed once in a lifetime unless your doctor decides differently. In addition to your physical exam, some screening tests are recommended:    All people over 65 should have a yearly flu shot. People over 65 are at medium to high risk for Hepatitis B. Three shots are needed for complete protection. Bone mass measurement (dexa scan) is recommended every two years. Diabetes Mellitus screening is recommended every year. Glaucoma is an eye disease caused by high pressure in the eye. An eye exam is recommended every year. Cardiovascular screening tests that check your cholesterol and other blood fat (lipid) levels are recommended every five years. Colorectal Cancer screening tests help to find pre-cancerous polyps (growths in the colon) so they can be removed before they turn into cancer. Tests ordered for screening depend on your personal and family history risk factors. Prostate Cancer Screening (annually up to age 76)    Screening for breast cancer is recommended yearly with a Mammogram.    Screening for cervical and vaginal cancer is recommended with a pelvic and Pap test every two years.  However if you have had an abnormal pap in the past  three years or at high risk for cervical or vaginal cancer Medicare will cover a pap test and a pelvic exam every year.      Here is a list of your current Health Maintenance items with a due date:  Health Maintenance Due   Topic Date Due    COVID-19 Vaccine (4 - Booster for Moderna series) 02/27/2022    Yearly Flu Vaccine (1) 09/01/2022

## 2022-09-02 NOTE — ASSESSMENT & PLAN NOTE
Has maintained A1C under 6 in recent years, will continue regular monitoring  Discussed diet and exercise strategies and adding metformin

## 2022-09-02 NOTE — ASSESSMENT & PLAN NOTE
She is hoping to lose weight for knee surgery, wonders about phentermine  I feel she would be higher risk for this and suggest trying metformin first

## 2022-09-02 NOTE — ASSESSMENT & PLAN NOTE
Lab Results   Component Value Date/Time    LDL, calculated 117 (H) 08/29/2022 07:12 AM    LDL, calculated 87 12/26/2019 07:27 AM     No improvement with atorvastatin 40->80mg  Suggest trying crestor instead Yes

## 2022-09-07 ENCOUNTER — OFFICE VISIT (OUTPATIENT)
Dept: ORTHOPEDIC SURGERY | Age: 77
End: 2022-09-07
Payer: MEDICARE

## 2022-09-07 VITALS — HEIGHT: 64 IN | WEIGHT: 224 LBS | BODY MASS INDEX: 38.24 KG/M2

## 2022-09-07 DIAGNOSIS — M17.12 PRIMARY OSTEOARTHRITIS OF LEFT KNEE: Primary | ICD-10-CM

## 2022-09-07 PROCEDURE — G8417 CALC BMI ABV UP PARAM F/U: HCPCS | Performed by: ORTHOPAEDIC SURGERY

## 2022-09-07 PROCEDURE — 1101F PT FALLS ASSESS-DOCD LE1/YR: CPT | Performed by: ORTHOPAEDIC SURGERY

## 2022-09-07 PROCEDURE — G9899 SCRN MAM PERF RSLTS DOC: HCPCS | Performed by: ORTHOPAEDIC SURGERY

## 2022-09-07 PROCEDURE — G8399 PT W/DXA RESULTS DOCUMENT: HCPCS | Performed by: ORTHOPAEDIC SURGERY

## 2022-09-07 PROCEDURE — G8536 NO DOC ELDER MAL SCRN: HCPCS | Performed by: ORTHOPAEDIC SURGERY

## 2022-09-07 PROCEDURE — G8756 NO BP MEASURE DOC: HCPCS | Performed by: ORTHOPAEDIC SURGERY

## 2022-09-07 PROCEDURE — G8427 DOCREV CUR MEDS BY ELIG CLIN: HCPCS | Performed by: ORTHOPAEDIC SURGERY

## 2022-09-07 PROCEDURE — 1123F ACP DISCUSS/DSCN MKR DOCD: CPT | Performed by: ORTHOPAEDIC SURGERY

## 2022-09-07 PROCEDURE — 1090F PRES/ABSN URINE INCON ASSESS: CPT | Performed by: ORTHOPAEDIC SURGERY

## 2022-09-07 PROCEDURE — 99213 OFFICE O/P EST LOW 20 MIN: CPT | Performed by: ORTHOPAEDIC SURGERY

## 2022-09-07 PROCEDURE — 3017F COLORECTAL CA SCREEN DOC REV: CPT | Performed by: ORTHOPAEDIC SURGERY

## 2022-09-07 PROCEDURE — G8432 DEP SCR NOT DOC, RNG: HCPCS | Performed by: ORTHOPAEDIC SURGERY

## 2022-09-07 NOTE — LETTER
9/7/2022    Patient: Venus Ferris   YOB: 1945   Date of Visit: 9/7/2022     Jeanna Oppenheim, [address]  [facility]    Dear Jeanna Oppenheim, MD,      Thank you for referring Ms. Malena Jansen to [facility] for evaluation. My notes for this consultation are attached. If you have questions, please do not hesitate to call me. I look forward to following your patient along with you.       Sincerely,    Phoenix Reza MD

## 2022-09-07 NOTE — PROGRESS NOTES
Abby Ewing (: 1945) is a 78 y.o. female, patient, here for evaluation of the following chief complaint(s):  Knee Pain (left)       SUBJECTIVE/OBJECTIVE:  Abby Ewing presents today for follow-up of severe left knee osteoarthritis. She has had a few corticosteroid injections with very limited relief. She has significant medial pain at start up, in the morning. Has some anterior pain with stairs. Has difficulty getting around and being on her feet for more than a few minutes at a time. In general she is much less active than she used to be due to her knee issues. She does not have wakening night pain. Denies significant mechanical symptoms. Anti-inflammatories have not helped in the past.      PHYSICAL EXAM:  Vitals: Ht 5' 4\" (1.626 m)   Wt 224 lb (101.6 kg)   BMI 38.45 kg/m²   Body mass index is 38.45 kg/m². 78y.o. year old F, no distress. Ambulates with trace limp on the left side. Mild varus posture left knee when standing. No thrust.  Pain-free motion left hip. Negative Stinchfield. Left knee demonstrates no effusion. Significant medial joint line tenderness. Lacks few degrees of active extension, flexion to approximately 120 degrees with medial discomfort. No instability. Symmetrical palpable distal pulses. No gross motor or sensory deficits in lower extremities. No distal edema. IMAGING:  Radiographs: XR Results (most recent):  Results from Appointment encounter on 22    XR KNEE LT MIN 4 V    Narrative  4 x-ray views of left knee including AP and PA flexion, lateral, sunrise demonstrate severe osteoarthritis with complete loss of medial compartment joint space on PA flexion view. Moderate loss of lateral patellofemoral space with lateral patellar tilt and marginal osteophyte formation. ASSESSMENT/PLAN:  1. Primary osteoarthritis of left knee  -     XR KNEE LT MIN 4 V; Future    Severe left knee osteoarthritis.   Discussed continued conservative treatment measures versus left total knee replacement. She is moving toward left total knee replacement would like to wait until January if possible. She will let us know when she is ready to proceed. She will return to see me preoperatively for another detailed discussion. No follow-ups on file. Review Of Systems  ROS    Positive for: Musculoskeletal  Last edited by Tariq Waever on 9/7/2022 10:41 AM.         Patient denies any recent fever, chills, nausea, vomiting, chest pain, or shortness of breath. Allergies   Allergen Reactions    Ace Inhibitors Cough    Thiazides Photosensitivity       Current Outpatient Medications   Medication Sig    rosuvastatin (CRESTOR) 20 mg tablet Take 1 Tablet by mouth nightly. metFORMIN ER (GLUCOPHAGE XR) 500 mg tablet Take 1 Tablet by mouth two (2) times daily (with meals). amLODIPine (NORVASC) 5 mg tablet Take 1 Tablet by mouth every evening. losartan (COZAAR) 100 mg tablet Take 1 Tablet by mouth every evening. aspirin delayed-release 81 mg tablet Take 1 Tablet by mouth daily. FUTURE REFILLS MUST COME FROM NEW PCP - KATIE SKIFF NO LONGER AT PRACTICE. cholecalciferol (VITAMIN D3) (2,000 UNITS /50 MCG) cap capsule Take 2,000 Units by mouth two (2) times a day. acetaminophen (TYLENOL) 650 mg TbER Take 650 mg by mouth every six to eight (6-8) hours as needed. acetaminophen/diphenhydramine (TYLENOL PM PO) Take  by mouth nightly as needed. VIT C/E/ZN/COPPR/LUTEIN/ZEAXAN (PRESERVISION AREDS 2 PO) Take 1 Tab by mouth two (2) times daily (with meals). multivitamin (ONE A DAY) tablet Take 1 Tab by mouth daily. No current facility-administered medications for this visit.        Past Medical History:   Diagnosis Date    Arrhythmia     HEART PALPITATIONS OCCASIONALLY    Arthritis     Chronic pain 2016    LEFT KNEE    Colon polyps     hyperplastic    Hypercholesterolemia     Hypertension     Nausea & vomiting         Past Surgical History:   Procedure Laterality Date    ENDOSCOPY, COLON, DIAGNOSTIC  8/2009    HX CAROTID ENDARTERECTOMY Right 07/01/2019    Dr. Marisabel Sanders    HX HEENT      T&A (childhood)    HX HERNIA REPAIR      umbilical    HX ORTHOPAEDIC Left 2016    MENISCUS REPAIR    HX ORTHOPAEDIC Right     JOINT SURGERY ON MIDDLE FINGER TO REMOVE A GROWTH    HX TUBAL LIGATION  1990       Family History   Problem Relation Age of Onset    Macular Degen Mother     OSTEOARTHRITIS Mother     Osteoporosis Mother     Other Mother         tremor    Cancer Mother         Esophageal    Parkinsonism Father     Stroke Father         CVA (42's)    Bipolar Disorder Sister     No Known Problems Sister     No Known Problems Daughter     Attention Deficit Hyperactivity Disorder Daughter     No Known Problems Daughter     Anesth Problems Neg Hx         Social History     Socioeconomic History    Marital status:      Spouse name: Not on file    Number of children: Not on file    Years of education: Not on file    Highest education level: Not on file   Occupational History    Not on file   Tobacco Use    Smoking status: Never    Smokeless tobacco: Never   Vaping Use    Vaping Use: Never used   Substance and Sexual Activity    Alcohol use:  Yes     Alcohol/week: 9.0 standard drinks     Types: 4 Shots of liquor, 5 Standard drinks or equivalent per week     Comment: socially    Drug use: No    Sexual activity: Not Currently     Partners: Male   Other Topics Concern     Service Not Asked    Blood Transfusions Not Asked    Caffeine Concern Not Asked    Occupational Exposure Not Asked    Hobby Hazards Not Asked    Sleep Concern Yes    Stress Concern Yes    Weight Concern Yes    Special Diet No    Back Care Not Asked    Exercise Not Asked    Bike Helmet Not Asked    Seat Belt Not Asked    Self-Exams Not Asked   Social History Narrative    Not on file     Social Determinants of Health     Financial Resource Strain: Not on file   Food Insecurity: Not on file   Transportation Needs: Not on file   Physical Activity: Not on file   Stress: Not on file   Social Connections: Not on file   Intimate Partner Violence: Not on file   Housing Stability: Not on file       Orders Placed This Encounter    XR KNEE LT MIN 4 V     Standing Status:   Future     Number of Occurrences:   1     Standing Expiration Date:   9/8/2023        An electronic signature was used to authenticate this note.   -- Jani Salinas MD

## 2022-09-13 DIAGNOSIS — M17.12 PRIMARY OSTEOARTHRITIS OF LEFT KNEE: Primary | ICD-10-CM

## 2022-12-03 LAB
ALT SERPL-CCNC: 20 IU/L (ref 0–32)
AST SERPL-CCNC: 22 IU/L (ref 0–40)
CHOLEST SERPL-MCNC: 163 MG/DL (ref 100–199)
HDLC SERPL-MCNC: 55 MG/DL
LDLC SERPL CALC-MCNC: 87 MG/DL (ref 0–99)
TRIGL SERPL-MCNC: 118 MG/DL (ref 0–149)
VLDLC SERPL CALC-MCNC: 21 MG/DL (ref 5–40)

## 2022-12-05 DIAGNOSIS — E78.00 HYPERCHOLESTEREMIA: ICD-10-CM

## 2022-12-05 RX ORDER — ROSUVASTATIN CALCIUM 20 MG/1
TABLET, COATED ORAL
Qty: 90 TABLET | Refills: 0 | Status: SHIPPED | OUTPATIENT
Start: 2022-12-05

## 2022-12-05 NOTE — TELEPHONE ENCOUNTER
----- Message from Daljit Olmedo MD sent at 12/5/2022 11:33 AM EST -----  Please let her know labs are looking good now on crestor, next follow up should be march

## 2022-12-14 ENCOUNTER — OFFICE VISIT (OUTPATIENT)
Dept: ORTHOPEDIC SURGERY | Age: 77
End: 2022-12-14
Payer: MEDICARE

## 2022-12-14 VITALS — BODY MASS INDEX: 35.65 KG/M2 | HEIGHT: 65 IN | WEIGHT: 214 LBS

## 2022-12-14 DIAGNOSIS — M17.12 PRIMARY OSTEOARTHRITIS OF LEFT KNEE: Primary | ICD-10-CM

## 2022-12-14 NOTE — LETTER
12/23/2022    Patient: Armand Nick   YOB: 1945   Date of Visit: 12/14/2022     Vasquez Joshua, [address]    Dear Vasquez Joshua MD,      Thank you for referring Ms. Soren Schreiber to [facility] for evaluation. My notes for this consultation are attached. If you have questions, please do not hesitate to call me. I look forward to following your patient along with you.       Sincerely,    Brianna Briggs MD

## 2022-12-14 NOTE — PROGRESS NOTES
Bushra Uriostegui (: 1945) is a 78 y.o. female, patient, here for evaluation of the following chief complaint(s):  Knee Pain (Left /)       SUBJECTIVE/OBJECTIVE:  Bushra Uriostegui presents today for follow-up of severe left knee osteoarthritis. She comes in asking to move forward with left total knee replacement. She has significant medial pain at start up, in the morning. Has some anterior pain with stairs. Has difficulty getting around and being on her feet for more than a few minutes at a time. In general she is much less active than she used to be due to her knee issues. She has rest pain during the day. Now having intermittent wakening night pain. Denies significant mechanical symptoms. Anti-inflammatories have not helped in the past.  She is very unhappy with progressive pain and dysfunction, declining quality of life. PHYSICAL EXAM:  Vitals: Ht 5' 5\" (1.651 m)   Wt 214 lb (97.1 kg)   BMI 35.61 kg/m²   Body mass index is 35.61 kg/m². 78y.o. year old F ambulates with a limp on the left. Pain-free motion left hip. Negative Stinchfield. Left knee is in varus. No effusion. Medial joint line tenderness. Lacks about 5 degrees of active extension, flexion to approximately 120 degrees with medial pain. Mild pseudolaxity, no instability. Symmetrical palpable distal pulses. No gross motor or sensory deficits in lower extremities. No distal edema. IMAGING:  Radiographs: Left knee x-rays in September demonstrated severe osteoarthritis with complete loss of medial joint space on PA flexion view, moderate loss patellofemoral space. Tricompartmental osteophytes. ASSESSMENT/PLAN:  1. Primary osteoarthritis of left knee    Severe left knee osteoarthritis. We discussed continued conservative treatment measures versus left total knee replacement.   Symptoms have progressed despite comprehensive conservative treatment measures outlined above and she desires to proceed with left total knee replacement. We discussed risks, benefits, and alternatives in detail, as well as anticipated hospital stay and course of rehabilitation. She will see her primary care physician prior to surgery. All questions answered. Plan to use IV tranexamic acid intraoperatively, aspirin for DVT prophylaxis. We will ask anesthesia to take all precautions necessary to help with perioperative nausea. No follow-ups on file. Review Of Systems  ROS    Positive for: Musculoskeletal  Last edited by Filemon Bacon on 12/14/2022  2:51 PM.         Patient denies any recent fever, chills, nausea, vomiting, chest pain, or shortness of breath. Allergies   Allergen Reactions    Ace Inhibitors Cough    Thiazides Photosensitivity       Current Outpatient Medications   Medication Sig    rosuvastatin (CRESTOR) 20 mg tablet TAKE 1 TABLET BY MOUTH EVERY NIGHT    metFORMIN ER (GLUCOPHAGE XR) 500 mg tablet Take 1 Tablet by mouth two (2) times daily (with meals). amLODIPine (NORVASC) 5 mg tablet Take 1 Tablet by mouth every evening. losartan (COZAAR) 100 mg tablet Take 1 Tablet by mouth every evening. cholecalciferol (VITAMIN D3) (2,000 UNITS /50 MCG) cap capsule Take 2,000 Units by mouth two (2) times a day. acetaminophen (TYLENOL) 650 mg TbER Take 650 mg by mouth every six to eight (6-8) hours as needed. multivitamin (ONE A DAY) tablet Take 1 Tab by mouth daily. aspirin delayed-release 81 mg tablet Take 1 Tablet by mouth daily. FUTURE REFILLS MUST COME FROM NEW PCP - KATIE SKIFF NO LONGER AT PRACTICE. (Patient not taking: Reported on 12/14/2022)    acetaminophen/diphenhydramine (TYLENOL PM PO) Take  by mouth nightly as needed. VIT C/E/ZN/COPPR/LUTEIN/ZEAXAN (PRESERVISION AREDS 2 PO) Take 1 Tab by mouth two (2) times daily (with meals). No current facility-administered medications for this visit.        Past Medical History:   Diagnosis Date    Arrhythmia     HEART PALPITATIONS OCCASIONALLY    Arthritis     Chronic pain 2016    LEFT KNEE    Colon polyps     hyperplastic    Hypercholesterolemia     Hypertension     Nausea & vomiting         Past Surgical History:   Procedure Laterality Date    ENDOSCOPY, COLON, DIAGNOSTIC  8/2009    HX CAROTID ENDARTERECTOMY Right 07/01/2019    Dr. Miki Gupta    HX HEENT      T&A (childhood)    HX HERNIA REPAIR      umbilical    HX ORTHOPAEDIC Left 2016    MENISCUS REPAIR    HX ORTHOPAEDIC Right     JOINT SURGERY ON MIDDLE FINGER TO REMOVE A GROWTH    HX TUBAL LIGATION  1990       Family History   Problem Relation Age of Onset    Macular Degen Mother     OSTEOARTHRITIS Mother     Osteoporosis Mother     Other Mother         tremor    Cancer Mother         Esophageal    Parkinsonism Father     Stroke Father         CVA (42's)    Bipolar Disorder Sister     No Known Problems Sister     No Known Problems Daughter     Attention Deficit Hyperactivity Disorder Daughter     No Known Problems Daughter     Anesth Problems Neg Hx         Social History     Socioeconomic History    Marital status:      Spouse name: Not on file    Number of children: Not on file    Years of education: Not on file    Highest education level: Not on file   Occupational History    Not on file   Tobacco Use    Smoking status: Never    Smokeless tobacco: Never   Vaping Use    Vaping Use: Never used   Substance and Sexual Activity    Alcohol use:  Yes     Alcohol/week: 9.0 standard drinks     Types: 4 Shots of liquor, 5 Standard drinks or equivalent per week     Comment: socially    Drug use: No    Sexual activity: Not Currently     Partners: Male   Other Topics Concern     Service Not Asked    Blood Transfusions Not Asked    Caffeine Concern Not Asked    Occupational Exposure Not Asked    Hobby Hazards Not Asked    Sleep Concern Yes    Stress Concern Yes    Weight Concern Yes    Special Diet No    Back Care Not Asked    Exercise Not Asked    Bike Helmet Not Asked Seat Belt Not Asked    Self-Exams Not Asked   Social History Narrative    Not on file     Social Determinants of Health     Financial Resource Strain: Not on file   Food Insecurity: Not on file   Transportation Needs: Not on file   Physical Activity: Not on file   Stress: Not on file   Social Connections: Not on file   Intimate Partner Violence: Not on file   Housing Stability: Not on file       No orders of the defined types were placed in this encounter. An electronic signature was used to authenticate this note.   -- Miriam Fernández MD

## 2022-12-19 ENCOUNTER — HOSPITAL ENCOUNTER (OUTPATIENT)
Dept: PREADMISSION TESTING | Age: 77
Discharge: HOME | End: 2022-12-19
Payer: MEDICARE

## 2022-12-19 VITALS
TEMPERATURE: 97.7 F | WEIGHT: 210.98 LBS | BODY MASS INDEX: 35.15 KG/M2 | SYSTOLIC BLOOD PRESSURE: 152 MMHG | HEART RATE: 56 BPM | RESPIRATION RATE: 18 BRPM | DIASTOLIC BLOOD PRESSURE: 74 MMHG | HEIGHT: 65 IN

## 2022-12-19 LAB
ABO + RH BLD: NORMAL
AMORPH CRY URNS QL MICRO: ABNORMAL
ANION GAP SERPL CALC-SCNC: 6 MMOL/L (ref 5–15)
APPEARANCE UR: ABNORMAL
BACTERIA URNS QL MICRO: NEGATIVE /HPF
BILIRUB UR QL: NEGATIVE
BLOOD GROUP ANTIBODIES SERPL: NORMAL
BUN SERPL-MCNC: 17 MG/DL (ref 6–20)
BUN/CREAT SERPL: 28 (ref 12–20)
CALCIUM SERPL-MCNC: 10 MG/DL (ref 8.5–10.1)
CAOX CRY URNS QL MICRO: ABNORMAL
CHLORIDE SERPL-SCNC: 108 MMOL/L (ref 97–108)
CO2 SERPL-SCNC: 27 MMOL/L (ref 21–32)
COLOR UR: ABNORMAL
CREAT SERPL-MCNC: 0.61 MG/DL (ref 0.55–1.02)
EPITH CASTS URNS QL MICRO: ABNORMAL /LPF
ERYTHROCYTE [DISTWIDTH] IN BLOOD BY AUTOMATED COUNT: 13.2 % (ref 11.5–14.5)
EST. AVERAGE GLUCOSE BLD GHB EST-MCNC: 108 MG/DL
GLUCOSE SERPL-MCNC: 85 MG/DL (ref 65–100)
GLUCOSE UR STRIP.AUTO-MCNC: NEGATIVE MG/DL
HBA1C MFR BLD: 5.4 % (ref 4–5.6)
HCT VFR BLD AUTO: 39.5 % (ref 35–47)
HGB BLD-MCNC: 13.2 G/DL (ref 11.5–16)
HGB UR QL STRIP: NEGATIVE
INR PPP: 1 (ref 0.9–1.1)
KETONES UR QL STRIP.AUTO: NEGATIVE MG/DL
LEUKOCYTE ESTERASE UR QL STRIP.AUTO: NEGATIVE
MCH RBC QN AUTO: 30.6 PG (ref 26–34)
MCHC RBC AUTO-ENTMCNC: 33.4 G/DL (ref 30–36.5)
MCV RBC AUTO: 91.6 FL (ref 80–99)
NITRITE UR QL STRIP.AUTO: NEGATIVE
NRBC # BLD: 0 K/UL (ref 0–0.01)
NRBC BLD-RTO: 0 PER 100 WBC
OTHER,OTHU: ABNORMAL
PH UR STRIP: 5 [PH] (ref 5–8)
PLATELET # BLD AUTO: 226 K/UL (ref 150–400)
PMV BLD AUTO: 12 FL (ref 8.9–12.9)
POTASSIUM SERPL-SCNC: 4.3 MMOL/L (ref 3.5–5.1)
PROT UR STRIP-MCNC: NEGATIVE MG/DL
PROTHROMBIN TIME: 10.9 SEC (ref 9–11.1)
RBC # BLD AUTO: 4.31 M/UL (ref 3.8–5.2)
RBC #/AREA URNS HPF: ABNORMAL /HPF (ref 0–5)
SODIUM SERPL-SCNC: 141 MMOL/L (ref 136–145)
SP GR UR REFRACTOMETRY: 1.02 (ref 1–1.03)
SPECIMEN EXP DATE BLD: NORMAL
UA: UC IF INDICATED,UAUC: ABNORMAL
UROBILINOGEN UR QL STRIP.AUTO: 0.2 EU/DL (ref 0.2–1)
WBC # BLD AUTO: 7 K/UL (ref 3.6–11)
WBC URNS QL MICRO: ABNORMAL /HPF (ref 0–4)

## 2022-12-19 PROCEDURE — 85610 PROTHROMBIN TIME: CPT

## 2022-12-19 PROCEDURE — 36415 COLL VENOUS BLD VENIPUNCTURE: CPT

## 2022-12-19 PROCEDURE — 80048 BASIC METABOLIC PNL TOTAL CA: CPT

## 2022-12-19 PROCEDURE — 93005 ELECTROCARDIOGRAM TRACING: CPT

## 2022-12-19 PROCEDURE — 86900 BLOOD TYPING SEROLOGIC ABO: CPT

## 2022-12-19 PROCEDURE — 85027 COMPLETE CBC AUTOMATED: CPT

## 2022-12-19 PROCEDURE — 81001 URINALYSIS AUTO W/SCOPE: CPT

## 2022-12-19 PROCEDURE — 83036 HEMOGLOBIN GLYCOSYLATED A1C: CPT

## 2022-12-19 NOTE — PERIOP NOTES
[address]  ORTHOPAEDIC    Surgery Date:   1/5/23    Your surgeon's office or [facility] staff will call you between 4 PM- 8 PM the day before surgery with your arrival time. If your surgery is on a Monday, you will receive a call the preceding Friday. Please report to [facility] Patient Access/Admitting on the 1st floor. Bring your insurance card, photo identification, and any copayment (if applicable). If you are going home the same day of your surgery, you must have a responsible adult to drive you home. You need to have a responsible adult to stay with you the first 24 hours after surgery and you should not drive a car for 24 hours following your surgery. Do NOT eat any solid foods after midnight the night before surgery including candy, mints or gum. You may drink clear liquids from midnight until 1 hour prior to arrival time. You may drink up to 12 ounces at one time every 4 hours. Do NOT drink alcohol or smoke 24 hours before surgery. STOP smoking for 14 days prior as it helps with breathing and healing after surgery. If your arrival time is 3pm or later, you may eat a light breakfast before 8am (toast, bagel-no butter, black coffee, plain tea, fruit juice-no pulp) Please note special instructions, if applicable, below for medications. If you are being admitted to the hospital,please leave personal belongings/luggage in your car until you have an assigned hospital room number. Please wear comfortable clothes. Wear your glasses instead of contacts. We ask that all money, jewelry and valuables be left at home. Wear no make up, particularly mascara, the day of surgery. All body piercings, rings, and jewelry need to be removed and left at home. Please remove any nail polish or artificial nails from your fingernails. Please wear your hair loose or down. Please no pony-tails, buns, or any metal hair accessories.  If you shower the morning of surgery, please do not apply any lotions or powders afterwards. You may wear deodorant. Do not shave any body area within 24 hours of your surgery. Please follow all instructions to avoid any potential surgical cancellation. Should your physical condition change, (i.e. fever, cold, flu, etc.) please notify your surgeon as soon as possible. It is important to be on time. If a situation occurs where you may be delayed, please call:  [phone] / [phone] on the day of surgery. The Preadmission Testing staff can be reached at [phone]. Special instructions: [address] [facility], APPLY SCOP PATCH 1 HOUR PRIOR TO ARRIVING AT [facility] 34    Current Outpatient Medications   Medication Sig    multivitamin, tx-iron-ca-min (THERA-M w/ IRON) 9 mg iron-400 mcg tab tablet Take 1 Tablet by mouth daily. aspirin 81 mg cap Take 1 Tablet by mouth daily. rosuvastatin (CRESTOR) 20 mg tablet TAKE 1 TABLET BY MOUTH EVERY NIGHT    metFORMIN ER (GLUCOPHAGE XR) 500 mg tablet Take 1 Tablet by mouth two (2) times daily (with meals). (Patient taking differently: Take 500 mg by mouth two (2) times a day.)    amLODIPine (NORVASC) 5 mg tablet Take 1 Tablet by mouth every evening. losartan (COZAAR) 100 mg tablet Take 1 Tablet by mouth every evening. acetaminophen/diphenhydramine (TYLENOL PM PO) Take  by mouth nightly as needed. VIT C/E/ZN/COPPR/LUTEIN/ZEAXAN (PRESERVISION AREDS 2 PO) Take 1 Tablet by mouth two (2) times a day. cholecalciferol (VITAMIN D3) (2,000 UNITS /50 MCG) cap capsule Take 2,000 Units by mouth every evening. No current facility-administered medications for this encounter.        YOU MUST ONLY TAKE THESE MEDICATIONS THE MORNING OF SURGERY WITH A SIP OF WATER: NONE  MEDICATIONS TO TAKE THE MORNING OF SURGERY ONLY IF NEEDED: NONE  HOLD these prescription medications BEFORE Surgery: DO NOT TAKE METFORMIN FOR 24 HOURS PRIOR TO SURGERY  Ask your surgeon/prescribing physician about when/if to STOP taking these medications: ASPIRIN  Stop any non-steroidal anti-inflammatory drugs (i.e. Ibuprofen, Naproxen, Advil, Aleve) 3 days before surgery. You may take Tylenol. STOP all vitamins and herbal supplements 1 week prior to  surgery. If you are currently taking Plavix, Coumadin, or any other blood-thinning/anticoagulant medication contact your prescribing physician for instructions. Preventing Infections Before and After - Your Surgery    IMPORTANT INSTRUCTIONS    You play an important role in your health and preparation for surgery. To reduce the germs on your skin you will need to shower with CHG soap (Chorhexidine gluconate 4%) two times before surgery. CHG soap (Hibiclens, Hex-A-Clens or store brand)  CHG soap will be provided at your Preadmission Testing (PAT) appointment. If you do not have a PAT appointment before surgery, you may arrange to  CHG soap from our office or purchase CHG soap at a pharmacy, grocery or department store. You need to purchase TWO 4 ounce bottles to use for your 2 showers. Steps to follow:  [place] your hair with your normal shampoo and your body with regular soap and rinse well to remove shampoo and soap from your skin. Wet a clean washcloth and turn off the shower. Put CHG soap on washcloth and apply to your entire body from the neck down. Do not use on your head, face or private parts(genitals). Do not use CHG soap on open sores, wounds or areas of skin irritation. Wash you body gently for 5 minutes. Do not wash your skin too hard. This soap does not create lather. Pay special attention to your underarms and from your belly button to your feet. Turn the shower back on and rinse well to get CHG soap off your body. Pat your skin dry with a clean, dry towel. Do not apply lotions or moisturizer. Put on clean clothes and sleep on fresh bed sheets and do not allow pets to sleep with you.     Shower with CHG soap 2 times before your surgery  The evening before your surgery  The morning of your surgery      Tips to help prevent infections after your surgery:  Protect your surgical wound from germs:  Hand washing is the most important thing you and your caregivers can do to prevent infections. Keep your bandage clean and dry! Do not touch your surgical wound. Use clean, freshly washed towels and washcloths every time you shower; do not share bath linens with others. Until your surgical wound is healed, wear clothing and sleep on bed linens each day that are clean and freshly washed. Do not allow pets to sleep in your bed with you or touch your surgical wound. Do not smoke - smoking delays wound healing. This may be a good time to stop smoking. If you have diabetes, it is important for you to manage your blood sugar levels properly before your surgery as well as after your surgery. Poorly managed blood sugar levels slow down wound healing and prevent you from healing completely. Prevention of Infection  Testing for Staphylococcus aureus on your skin before surgery    Staphylococcus aureus (staph) is a common bacteria that is found on the body. It normally does not cause infection on healthy skin. Before surgery, you will be tested to see if you have staph by swabbing the inside of your nose. When you have an incision with surgery, the goal is to protect that incision from infection. Removal of the staph bacteria before surgery can decrease the risk of a surgical site infection. If your nose swab is positive for staph you will be called. Your treatment will include 2 steps:  Prescription for Mupirocin ointment to be used in each nostril twice a day for 5 days. Showering with Chlorhexidine (CHG) liquid soap for 5 days prior to surgery. How to use Mupirocin ointment in your nose   the prescription from your pharmacy. You will receive a large tube of ointment which will be big enough for all of your treatments.  You will apply this ointment to each nostril 2 times a day for 5 days. Wash your hands with  gel or soap and water for 20 seconds before using ointment. Place a pea-sized amount of ointment on a cotton Q-tip. Apply ointment just inside of each nostril with the Q-tip. Do not push Q-tip or ointment deep inside you nose. Press your nostrils together and massage for a few seconds. Wash your hands with  gel or soap and water after you are finished. Do not get ointment near your eyes. If it gets into your eyes, rinse them with cool water. If you need to use nasal spray, clean the tip of the bottle with alcohol before use and do not use both at the same time. If you are scheduled for COVID testing during the 5 days, do NOT apply morning dose until after the COVID test has been performed. How to use Chlorhexidine (CHG) 4% liquid soap  Purchase an 8 ounce bottle of CHG liquid soap (Chlorhexidine 4%, Hibiclens, Hex-A-Clens or store brand) at a pharmacy or grocery store. Wash your hair with your normal shampoo and your body with regular soap and rinse well to remove shampoo and soap from your skin. Wet a clean washcloth and turn off the shower. Put CHG soap on washcloth and apply to your entire body from the neck down. Do not use on your head, face or private parts(genitals). Do not use CHG soap on open sores, wounds or areas of skin irritation. Wash your body gently for 5 minutes. Do not wash your skin too hard. This soap does not create lather. Pay special attention to your underarms and from your belly button to your feet. Turn the shower back on and rinse well to get CHG soap off your body. Pat your skin dry with a clean, dry towel. Do not apply lotions or moisturizer. Put on clean clothes and sleep on fresh bed sheets the night before surgery. Do not allow pets to sleep with you. Eating and Drinking Before Surgery    You may eat a regular dinner at the usual time on the day before your surgery.   Do NOT eat any solid foods after midnight unless your arrival time at the hospital is 3pm or later. You may drink clear liquids only from 12 midnight until 1 hours prior to your arrival time at the hospital on the day of your surgery. Do NOT drink alcohol. Clear liquids include:  Water  Fruit juices without pulp( i.e. apple juice)  Carbonated beverages  Black coffee (no cream/milk)  Tea (no cream/milk)  Gatorade  You may drink up to 12-16 ounces at one time every 4 hours between the hours of midnight and 1 hour before your arrival time at the hospital. Example- if your arrival time at the hospital is 6am, you may drink 12-16 ounces of clear liquids no later than 5am.  If your arrival time at the hospital is 3pm or later, you may eat a light breakfast before 8am.  A light breakfast includes: Toast or bagel (no butter)  Black coffee (no cream/milk)  Tea (no cream/milk)  Fruit juices without pulp ( i.e. apple juice)  Do NOT eat meat, eggs, vegetables or fruit  If you have any questions, please contact your surgeon's office. Patient Information Regarding COVID Restrictions    Day of Procedure    Please park in the parking deck or any designated visitor parking lot. Enter the facility through the Main Entrance of the hospital.  On the day of surgery, please provide the cell phone number of the person who will be waiting for you to the Patient Access representative at the time of registration. Masks are highly recommended in the hospital, but not required. Once your procedure and the immediate recovery period is completed, a nurse in the recovery area will contact your designated visitor to inform them of your room number or to otherwise review other pertinent information regarding your care. Social distancing practices are strongly encouraged in waiting areas and the cafeteria. The patient was contacted in person. She verbalized understanding of all instructions does not  need reinforcement.

## 2022-12-19 NOTE — PERIOP NOTES
COPY OF COVID VACCINE CARD PLACED ON CHART. CALLED DR KING'S OFFICE TO REQUEST LAST OFFICE VISIT NOTES, THEY WILL FAX.

## 2022-12-20 LAB
ATRIAL RATE: 58 BPM
BACTERIA SPEC CULT: NORMAL
BACTERIA SPEC CULT: NORMAL
CALCULATED P AXIS, ECG09: 31 DEGREES
CALCULATED R AXIS, ECG10: 28 DEGREES
CALCULATED T AXIS, ECG11: 33 DEGREES
DIAGNOSIS, 93000: NORMAL
P-R INTERVAL, ECG05: 158 MS
Q-T INTERVAL, ECG07: 418 MS
QRS DURATION, ECG06: 92 MS
QTC CALCULATION (BEZET), ECG08: 410 MS
SERVICE CMNT-IMP: NORMAL
VENTRICULAR RATE, ECG03: 58 BPM

## 2022-12-20 RX ORDER — SCOLOPAMINE TRANSDERMAL SYSTEM 1 MG/1
1 PATCH, EXTENDED RELEASE TRANSDERMAL ONCE
Qty: 1 PATCH | Refills: 0 | Status: SHIPPED | OUTPATIENT
Start: 2022-12-20 | End: 2022-12-20

## 2022-12-20 NOTE — PERIOP NOTES
Shin ESCAMILLA([id number]) was seen at [address] [place] [address] on 12/19/22. They have surgery scheduled with Dr. Genet Paredes on 1/5/23. The results of their KARL STOP-BANG Scoring Tool indicates the potential need for a referral to sleep medicine. Results forwarded to PCP for follow-up/further recommendations regarding evaluation for sleep apnea. KARL STOP-BANG Scoring Tool    [x] Does the patient snore loud enough to be heard through a closed door? [x] Does the patient often feel tired, fatigued or sleep during the daytime or after a \"good\" night's sleep? [] Has anyone observed the patient stop breathing during their sleep? [x] Does the patient have or are they treated for HTN? [x] Is the patient's BMI >35? [] Is the patient's neck size >17\"(male) or >16\"(female)? [x] Is the patient older than 48?  [] Is the patient male?     KARL Risk Score: [address] [facility], [place]

## 2022-12-20 NOTE — PERIOP NOTES
PAT Nurse Practitioner   Pre-Operative Chart Review/Assessment:-ORTHOPEDIC                Patient Name:  Moira Porter                                                           Age:   78 y.o.    :  1945     Today's Date:  2022     Date of PAT:   22      Date of Surgery:    23      Procedure(s):  Left Total Knee Arthroplasty     Surgeon:   Dr. Jenine Severin:      1)  Medical Clearance/PCP:  Addie Ann MD (PAT labs routed to PCP for continuity of care)      2)  Cardiac Clearance:  EKG and METs reviewed. No further cardiac evaluation requested. PAT EKG showed sinus ney. 3)  Diabetic Treatment Consult:  Not indicated. A1c-5.4      4)  Sleep Apnea evaluation:   KARL score of 5. Pt reports loud snoring that can be heard through a closed door, daytime fatigue, and a diagnosis of HTN. Pt denies witnessed pauses in breathing. Referral sent to PCP for F/U and recommendations.       5) Treatment for MRSA/Staph Aureus:  Neg      6) Additional Concerns:  PONV(scope patch ordered for PTA), HTN, dep/anx              Vital Signs:         Vitals:    22 1106   BP: (!) 152/74   Pulse: (!) 56   Resp: 18   Temp: 97.7 °F (36.5 °C)   Weight: 95.7 kg (210 lb 15.7 oz)   Height: 5' 5\" (1.651 m)          Body mass index is 35.11 kg/m².         ____________________________________________  PAST MEDICAL HISTORY  Past Medical History:   Diagnosis Date    Arrhythmia     HEART PALPITATIONS OCCASIONALLY    Arthritis     Chronic pain 2016    LEFT KNEE    Colon polyps     hyperplastic    Hypercholesterolemia     Hypertension     Nausea & vomiting     Psychiatric disorder     ANXIETY AND DEPRESSION      ____________________________________________  PAST SURGICAL HISTORY  Past Surgical History:   Procedure Laterality Date    ENDOSCOPY, COLON, DIAGNOSTIC  2009    HX CAROTID ENDARTERECTOMY Right 2019    Dr. Phillip Carrera    HX HEENT      T&A (childhood)    HX HERNIA REPAIR umbilical    HX ORTHOPAEDIC Left 2016    MENISCUS REPAIR    HX ORTHOPAEDIC Right     JOINT SURGERY ON MIDDLE FINGER TO REMOVE A GROWTH-PT DENIES    HX TUBAL LIGATION  1990      ____________________________________________  HOME MEDICATIONS  Current Outpatient Medications   Medication Sig    multivitamin, tx-iron-ca-min (THERA-M w/ IRON) 9 mg iron-400 mcg tab tablet Take 1 Tablet by mouth daily. aspirin 81 mg cap Take 1 Tablet by mouth daily. rosuvastatin (CRESTOR) 20 mg tablet TAKE 1 TABLET BY MOUTH EVERY NIGHT    metFORMIN ER (GLUCOPHAGE XR) 500 mg tablet Take 1 Tablet by mouth two (2) times daily (with meals). (Patient taking differently: Take 500 mg by mouth two (2) times a day.)    amLODIPine (NORVASC) 5 mg tablet Take 1 Tablet by mouth every evening. losartan (COZAAR) 100 mg tablet Take 1 Tablet by mouth every evening. acetaminophen/diphenhydramine (TYLENOL PM PO) Take  by mouth nightly as needed. VIT C/E/ZN/COPPR/LUTEIN/ZEAXAN (PRESERVISION AREDS 2 PO) Take 1 Tablet by mouth two (2) times a day. cholecalciferol (VITAMIN D3) (2,000 UNITS /50 MCG) cap capsule Take 2,000 Units by mouth every evening. No current facility-administered medications for this encounter.      ____________________________________________  ALLERGIES  Allergies   Allergen Reactions    Ace Inhibitors Cough    Thiazides Photosensitivity      ____________________________________________  SOCIAL HISTORY  Social History     Tobacco Use    Smoking status: Never    Smokeless tobacco: Never   Substance Use Topics    Alcohol use:  Yes     Alcohol/week: 1.0 standard drink     Types: 1 Shots of liquor per week     Comment: socially      ____________________________________________   Internal Administration   First Dose COVID-19, [facility], Primary or Immunocompromised, (age 18y+), IM, 100 mcg/0.5mL  01/29/2021   Second Dose COVID-19, [facility], Primary or Immunocompromised, (age 18y+), IM, 100 mcg/0.5mL  02/26/2021      Last COVID Lab No results found for: Bryn Cheng, RCV2CT, CVD2M, COV2, XPLCVT, [address], [address] [place] [address], [address], [id number] [facility] [place]                 Labs:     Hospital Outpatient Visit on 12/19/2022   Component Date Value Ref Range Status    Sodium 12/19/2022 141  136 - 145 mmol/L Final    Potassium 12/19/2022 4.3  3.5 - 5.1 mmol/L Final    Chloride 12/19/2022 108  97 - 108 mmol/L Final    CO2 12/19/2022 27  21 - 32 mmol/L Final    Anion gap 12/19/2022 6  5 - 15 mmol/L Final    Glucose 12/19/2022 85  65 - 100 mg/dL Final    BUN 12/19/2022 17  6 - 20 MG/DL Final    Creatinine 12/19/2022 0.61  0.55 - 1.02 MG/DL Final    BUN/Creatinine ratio 12/19/2022 28 (A)  12 - 20   Final    eGFR 12/19/2022 >60  >60 ml/min/1.73m2 Final    Comment:      Pediatric calculator link: [employer].at. org/professionals/kdoqi/gfr_calculatorped       These results are not intended for use in patients <25years of age. eGFR results are calculated without a race factor using  the 2021 CKD-EPI equation. Careful clinical correlation is recommended, particularly when comparing to results calculated using previous equations. The CKD-EPI equation is less accurate in patients with extremes of muscle mass, extra-renal metabolism of creatinine, excessive creatine ingestion, or following therapy that affects renal tubular secretion.       Calcium 12/19/2022 10.0  8.5 - 10.1 MG/DL Final    WBC 12/19/2022 7.0  3.6 - 11.0 K/uL Final    RBC 12/19/2022 4.31  3.80 - 5.20 M/uL Final    HGB 12/19/2022 13.2  11.5 - 16.0 g/dL Final    HCT 12/19/2022 39.5  35.0 - 47.0 % Final    MCV 12/19/2022 91.6  80.0 - 99.0 FL Final    MCH 12/19/2022 30.6  26.0 - 34.0 PG Final    MCHC 12/19/2022 33.4  30.0 - 36.5 g/dL Final    RDW 12/19/2022 13.2  11.5 - 14.5 % Final    PLATELET 17/06/6537 966  150 - 400 K/uL Final    MPV 12/19/2022 12.0  8.9 - 12.9 FL Final    NRBC 12/19/2022 0.0  0  WBC Final    ABSOLUTE NRBC 12/19/2022 0.00  0.00 - 0.01 K/uL Final    Crossmatch Expiration 12/19/2022 01/02/2023,2359   Final    ABO/Rh(D) 12/19/2022 O POSITIVE   Final    Antibody screen 12/19/2022 NEG   Final    INR 12/19/2022 1.0  0.9 - 1.1   Final    A single therapeutic range for Vit K antagonists may not be optimal for all indications - see June, 2008 issue of Chest, American College of Chest Physicians Evidence-Based Clinical Practice Guidelines, 8th Edition. Prothrombin time 12/19/2022 10.9  9.0 - 11.1 sec Final    Color 12/19/2022 YELLOW/STRAW    Final    Color Reference Range: Straw, Yellow or Dark Yellow    Appearance 12/19/2022 CLOUDY (A)  CLEAR   Final    Specific gravity 12/19/2022 1.021  1.003 - 1.030   Final    pH (UA) 12/19/2022 5.0  5.0 - 8.0   Final    Protein 12/19/2022 Negative  NEG mg/dL Final    Glucose 12/19/2022 Negative  NEG mg/dL Final    Ketone 12/19/2022 Negative  NEG mg/dL Final    Bilirubin 12/19/2022 Negative  NEG   Final    Blood 12/19/2022 Negative  NEG   Final    Urobilinogen 12/19/2022 0.2  0.2 - 1.0 EU/dL Final    Nitrites 12/19/2022 Negative  NEG   Final    Leukocyte Esterase 12/19/2022 Negative  NEG   Final    WBC 12/19/2022 0-4  0 - 4 /hpf Final    RBC 12/19/2022 0-5  0 - 5 /hpf Final    Epithelial cells 12/19/2022 FEW  FEW /lpf Final    Epithelial cell category consists of squamous cells and /or transitional urothelial cells. Renal tubular cells, if present, are separately identified as such.     Bacteria 12/19/2022 Negative  NEG /hpf Final    UA:UC IF INDICATED 12/19/2022 CULTURE NOT INDICATED BY UA RESULT  CNI   Final    Amorphous Crystals 12/19/2022 FEW (A)  NEG   Final    Other: 12/19/2022 Renal Epithelial cells Present    Final    CA Oxalate crystals 12/19/2022 3+ (A)  NEG Final    Ventricular Rate 12/19/2022 58  BPM Final    Atrial Rate 12/19/2022 58  BPM Final    P-R Interval 12/19/2022 158  ms Final    QRS Duration 12/19/2022 92  ms Final    Q-T Interval 12/19/2022 418  ms Final    QTC Calculation (Bezet) 12/19/2022 410  ms Final    Calculated P Axis 12/19/2022 31  degrees Final    Calculated R Axis 12/19/2022 28  degrees Final    Calculated T Axis 12/19/2022 33  degrees Final    Diagnosis 12/19/2022    Final                    Value:Sinus bradycardia  Otherwise normal ECG  When compared with ECG of 27-JUN-2019 15:17,  No significant change was found  Confirmed by Xi Grady M.D., Jorge Luis Lang ([id number]) on 12/20/2022 1:58:33 PM      Hemoglobin A1c 12/19/2022 5.4  4.0 - 5.6 % Final    Comment: NEW METHOD  PLEASE NOTE NEW REFERENCE RANGE  (NOTE)  HbA1C Interpretive Ranges  <5.7              Normal  5.7 - 6.4         Consider Prediabetes  >6.5              Consider Diabetes      Est. average glucose 12/19/2022 108  mg/dL Final    Special Requests: 12/19/2022 NO SPECIAL REQUESTS    Final    Culture result: 12/19/2022 MRSA NOT PRESENT    Final    Culture result: 12/19/2022     Final                    Value:Screening of patient nares for MRSA is for surveillance purposes and, if positive, to facilitate isolation considerations in high risk settings. It is not intended for automatic decolonization interventions per se as regimens are not sufficiently effective to warrant routine use. Orders Only on 12/02/2022   Component Date Value Ref Range Status    Cholesterol, total 12/02/2022 163  100 - 199 mg/dL Final    Triglyceride 12/02/2022 118  0 - 149 mg/dL Final    HDL Cholesterol 12/02/2022 55  >39 mg/dL Final    VLDL, calculated 12/02/2022 21  5 - 40 mg/dL Final    LDL, calculated 12/02/2022 87  0 - 99 mg/dL Final    AST (SGOT) 12/02/2022 22  0 - 40 IU/L Final    ALT (SGPT) 12/02/2022 20  0 - 32 IU/L Final       Skin:     Denies open wounds, cuts, sores, rashes or other areas of concern in PAT assessment.           Laura Gutierrez NP  Available via [address] [place] [address]

## 2022-12-21 ENCOUNTER — OFFICE VISIT (OUTPATIENT)
Dept: INTERNAL MEDICINE CLINIC | Age: 77
End: 2022-12-21
Payer: MEDICARE

## 2022-12-21 ENCOUNTER — DOCUMENTATION ONLY (OUTPATIENT)
Dept: INTERNAL MEDICINE CLINIC | Age: 77
End: 2022-12-21

## 2022-12-21 VITALS
DIASTOLIC BLOOD PRESSURE: 71 MMHG | HEIGHT: 65 IN | BODY MASS INDEX: 35.65 KG/M2 | WEIGHT: 214 LBS | SYSTOLIC BLOOD PRESSURE: 128 MMHG | RESPIRATION RATE: 16 BRPM | HEART RATE: 69 BPM | TEMPERATURE: 98.2 F | OXYGEN SATURATION: 96 %

## 2022-12-21 DIAGNOSIS — E78.5 HYPERLIPIDEMIA, UNSPECIFIED HYPERLIPIDEMIA TYPE: ICD-10-CM

## 2022-12-21 DIAGNOSIS — I10 HYPERTENSION, UNSPECIFIED TYPE: ICD-10-CM

## 2022-12-21 DIAGNOSIS — Z01.818 PREOP EXAMINATION: Primary | ICD-10-CM

## 2022-12-21 DIAGNOSIS — M17.12 PRIMARY OSTEOARTHRITIS OF LEFT KNEE: ICD-10-CM

## 2022-12-21 DIAGNOSIS — R73.03 PRE-DIABETES: ICD-10-CM

## 2022-12-21 DIAGNOSIS — M76.61 ACHILLES TENDINITIS OF RIGHT LOWER EXTREMITY: ICD-10-CM

## 2022-12-21 PROCEDURE — G8536 NO DOC ELDER MAL SCRN: HCPCS | Performed by: INTERNAL MEDICINE

## 2022-12-21 PROCEDURE — G8752 SYS BP LESS 140: HCPCS | Performed by: INTERNAL MEDICINE

## 2022-12-21 PROCEDURE — G8427 DOCREV CUR MEDS BY ELIG CLIN: HCPCS | Performed by: INTERNAL MEDICINE

## 2022-12-21 PROCEDURE — 3017F COLORECTAL CA SCREEN DOC REV: CPT | Performed by: INTERNAL MEDICINE

## 2022-12-21 PROCEDURE — G8510 SCR DEP NEG, NO PLAN REQD: HCPCS | Performed by: INTERNAL MEDICINE

## 2022-12-21 PROCEDURE — 1101F PT FALLS ASSESS-DOCD LE1/YR: CPT | Performed by: INTERNAL MEDICINE

## 2022-12-21 PROCEDURE — G8417 CALC BMI ABV UP PARAM F/U: HCPCS | Performed by: INTERNAL MEDICINE

## 2022-12-21 PROCEDURE — 1090F PRES/ABSN URINE INCON ASSESS: CPT | Performed by: INTERNAL MEDICINE

## 2022-12-21 PROCEDURE — G0463 HOSPITAL OUTPT CLINIC VISIT: HCPCS | Performed by: INTERNAL MEDICINE

## 2022-12-21 PROCEDURE — G8754 DIAS BP LESS 90: HCPCS | Performed by: INTERNAL MEDICINE

## 2022-12-21 PROCEDURE — 99214 OFFICE O/P EST MOD 30 MIN: CPT | Performed by: INTERNAL MEDICINE

## 2022-12-21 PROCEDURE — G8399 PT W/DXA RESULTS DOCUMENT: HCPCS | Performed by: INTERNAL MEDICINE

## 2022-12-21 NOTE — PROGRESS NOTES
Bushra Uriostegui presents for preop evaluation:     Surgeon : Dr. Hayley Hammer  Type of surgery : Total Knee Replacement  Surgery site : left knee  Anesthesia type: unknown  Date of procedure:  1/5/22    Allergies: Allergies   Allergen Reactions    Ace Inhibitors Cough    Thiazides Photosensitivity     Latex allergy: no  Patient has a history of severe nausea post anesthesia    No history of abnormal bleeding or clotting      Current Outpatient Medications:     multivitamin, tx-iron-ca-min (THERA-M w/ IRON) 9 mg iron-400 mcg tab tablet, Take 1 Tablet by mouth daily. , Disp: , Rfl:     aspirin 81 mg cap, Take 1 Tablet by mouth daily. , Disp: , Rfl:     rosuvastatin (CRESTOR) 20 mg tablet, TAKE 1 TABLET BY MOUTH EVERY NIGHT, Disp: 90 Tablet, Rfl: 0    metFORMIN ER (GLUCOPHAGE XR) 500 mg tablet, Take 1 Tablet by mouth two (2) times daily (with meals). (Patient taking differently: Take 500 mg by mouth two (2) times a day.), Disp: 180 Tablet, Rfl: 1    amLODIPine (NORVASC) 5 mg tablet, Take 1 Tablet by mouth every evening., Disp: 90 Tablet, Rfl: 1    losartan (COZAAR) 100 mg tablet, Take 1 Tablet by mouth every evening., Disp: 90 Tablet, Rfl: 1    cholecalciferol (VITAMIN D3) (2,000 UNITS /50 MCG) cap capsule, Take 2,000 Units by mouth every evening., Disp: , Rfl:     acetaminophen/diphenhydramine (TYLENOL PM PO), Take  by mouth nightly as needed. , Disp: , Rfl:     VIT C/E/ZN/COPPR/LUTEIN/ZEAXAN (PRESERVISION AREDS 2 PO), Take 1 Tablet by mouth two (2) times a day., Disp: , Rfl:   Patient Active Problem List   Diagnosis Code    Benign essential hypertension I10    Personal history of colonic polyps Z86.010    Hypercholesteremia E78.00    Obesity (BMI 35.0-39.9 without comorbidity) E66.9    Prediabetes R73.03    H/O bone density study Z92.89    Severe obesity (HCC) E66.01    Carotid stenosis, asymptomatic, right I65.21    Chronic right-sided thoracic back pain M54.6, G89.29    Memory change R41.3    Arthritis of knee M17.10 Past Surgical History:   Procedure Laterality Date    ENDOSCOPY, COLON, DIAGNOSTIC  08/2009    HX CAROTID ENDARTERECTOMY Right 07/01/2019    Dr. Gabriel Tolliver    HX HEENT      T&A (childhood)    HX HERNIA REPAIR      umbilical    HX ORTHOPAEDIC Left 2016    MENISCUS REPAIR    HX ORTHOPAEDIC Right     JOINT SURGERY ON MIDDLE FINGER TO REMOVE A GROWTH-PT DENIES    HX TUBAL LIGATION  1990     Social History     Tobacco Use    Smoking status: Never    Smokeless tobacco: Never   Vaping Use    Vaping Use: Never used   Substance Use Topics    Alcohol use: Yes     Alcohol/week: 1.0 standard drink     Types: 1 Shots of liquor per week     Comment: socially    Drug use: No         Review of Systems   Constitutional:  Positive for weight loss. Negative for chills and fever. 10 lb weight loss   HENT:  Negative for congestion, sinus pain and sore throat. Respiratory:  Negative for cough, shortness of breath and wheezing. Cardiovascular:  Negative for chest pain, palpitations and leg swelling. Gastrointestinal:  Negative for abdominal pain, blood in stool, constipation, diarrhea, melena, nausea and vomiting. Genitourinary:  Negative for dysuria, frequency and urgency. Musculoskeletal:         Left knee pain  Right achilles pain x 2 weeks. Denies injury. Worse with activity. Skin:  Negative for rash. Neurological:  Negative for dizziness, sensory change, focal weakness and headaches. Psychiatric/Behavioral:  Negative for depression. The patient is not nervous/anxious. Physical Exam:  Visit Vitals  /71 (BP 1 Location: Left upper arm, BP Patient Position: Sitting, BP Cuff Size: Large adult)   Pulse 69   Temp 98.2 °F (36.8 °C) (Temporal)   Resp 16   Ht 5' 5\" (1.651 m)   Wt 214 lb (97.1 kg)   SpO2 96%   BMI 35.61 kg/m²     HEENT: normocephalic, extraocular movements intact, conjunctiva clear  Oropharynx: clear.  No erythema, exudate, or drainage  Nose: no drainage  Sinuses: nontender  Neck: supple, without thyromegaly or lymphadenopathy  Neck circumference: 14 inches  Heart[de-identified] normal rate, regular rhythm, normal S1, S2, without murmur  Chest: clear to auscultation, no wheezes, rales or rhonchi,   Abdomen: soft, non tender without palpable mass or hepatosplenomegaly   Extremities: no edema  Tenderness over right Achilles tendon, full range of motion without swelling or ecchymoses, normal flexion and extension of ankle  Neuro: no focal weakness or sensory changes  Psych: normal mood and affect  Labs reviewed: normal BMP, CBC, urinalysis  HgB A1C 5.4 %  EKG: sinus bradycardia, no significant change when compared with 6/27/19 per Dr. Xi Grady    Assessment/Plan:   There are no medical contraindications to knee replacement surgery  Pre Diabetes: stable.  Hold Glucophage day of surgery  HTN: Continue current blood pressure medication    Right Achilles tendonitis: Reviewed stretching exercises, ice, topical Voltaren gel if needed  If symptoms continue follow up with Orthopedics  Hyperlipidemia: stable on Crestor

## 2022-12-23 DIAGNOSIS — I10 BENIGN ESSENTIAL HYPERTENSION: ICD-10-CM

## 2022-12-23 RX ORDER — AMLODIPINE BESYLATE 5 MG/1
5 TABLET ORAL EVERY EVENING
Qty: 90 TABLET | Refills: 1 | Status: SHIPPED | OUTPATIENT
Start: 2022-12-23

## 2022-12-23 RX ORDER — LOSARTAN POTASSIUM 100 MG/1
100 TABLET ORAL EVERY EVENING
Qty: 90 TABLET | Refills: 1 | Status: SHIPPED | OUTPATIENT
Start: 2022-12-23

## 2023-01-04 ENCOUNTER — ANESTHESIA EVENT (OUTPATIENT)
Dept: SURGERY | Age: 78
End: 2023-01-04
Payer: MEDICARE

## 2023-01-05 ENCOUNTER — ANESTHESIA (OUTPATIENT)
Dept: SURGERY | Age: 78
End: 2023-01-05
Payer: MEDICARE

## 2023-01-05 ENCOUNTER — HOSPITAL ENCOUNTER (OUTPATIENT)
Age: 78
Setting detail: OBSERVATION
Discharge: HOME HEALTH | End: 2023-01-06
Attending: ORTHOPAEDIC SURGERY | Admitting: ORTHOPAEDIC SURGERY
Payer: MEDICARE

## 2023-01-05 DIAGNOSIS — M17.12 PRIMARY OSTEOARTHRITIS OF LEFT KNEE: Primary | ICD-10-CM

## 2023-01-05 LAB
ABO + RH BLD: NORMAL
BLOOD GROUP ANTIBODIES SERPL: NORMAL
GLUCOSE BLD STRIP.AUTO-MCNC: 105 MG/DL (ref 65–117)
GLUCOSE BLD STRIP.AUTO-MCNC: 108 MG/DL (ref 65–117)
GLUCOSE BLD STRIP.AUTO-MCNC: 78 MG/DL (ref 65–117)
SERVICE CMNT-IMP: NORMAL
SPECIMEN EXP DATE BLD: NORMAL

## 2023-01-05 PROCEDURE — 76010000171 HC OR TIME 2 TO 2.5 HR INTENSV-TIER 1: Performed by: ORTHOPAEDIC SURGERY

## 2023-01-05 PROCEDURE — 74011250636 HC RX REV CODE- 250/636: Performed by: PHYSICIAN ASSISTANT

## 2023-01-05 PROCEDURE — 74011000250 HC RX REV CODE- 250: Performed by: PHYSICIAN ASSISTANT

## 2023-01-05 PROCEDURE — 77030040750 HC INSTR NAV SYS DISP ORLN -G: Performed by: ORTHOPAEDIC SURGERY

## 2023-01-05 PROCEDURE — 2709999900 HC NON-CHARGEABLE SUPPLY

## 2023-01-05 PROCEDURE — 77030019905 HC CATH URETH INTMIT MDII -A: Performed by: ORTHOPAEDIC SURGERY

## 2023-01-05 PROCEDURE — 2709999900 HC NON-CHARGEABLE SUPPLY: Performed by: ORTHOPAEDIC SURGERY

## 2023-01-05 PROCEDURE — 76060000036 HC ANESTHESIA 2.5 TO 3 HR: Performed by: ORTHOPAEDIC SURGERY

## 2023-01-05 PROCEDURE — 97530 THERAPEUTIC ACTIVITIES: CPT

## 2023-01-05 PROCEDURE — 77030031139 HC SUT VCRL2 J&J -A: Performed by: ORTHOPAEDIC SURGERY

## 2023-01-05 PROCEDURE — 74011250636 HC RX REV CODE- 250/636: Performed by: STUDENT IN AN ORGANIZED HEALTH CARE EDUCATION/TRAINING PROGRAM

## 2023-01-05 PROCEDURE — 77030028907 HC WRP KNEE WO BGS SOLM -B

## 2023-01-05 PROCEDURE — 77030039497 HC CST PAD STERILE CHCS -A: Performed by: ORTHOPAEDIC SURGERY

## 2023-01-05 PROCEDURE — 86900 BLOOD TYPING SEROLOGIC ABO: CPT

## 2023-01-05 PROCEDURE — 77030006835 HC BLD SAW SAG STRY -B: Performed by: ORTHOPAEDIC SURGERY

## 2023-01-05 PROCEDURE — C1776 JOINT DEVICE (IMPLANTABLE): HCPCS | Performed by: ORTHOPAEDIC SURGERY

## 2023-01-05 PROCEDURE — 77030033067 HC SUT PDO STRATFX SPIR J&J -B: Performed by: ORTHOPAEDIC SURGERY

## 2023-01-05 PROCEDURE — G0378 HOSPITAL OBSERVATION PER HR: HCPCS

## 2023-01-05 PROCEDURE — 82962 GLUCOSE BLOOD TEST: CPT

## 2023-01-05 PROCEDURE — 77030010507 HC ADH SKN DERMBND J&J -B: Performed by: ORTHOPAEDIC SURGERY

## 2023-01-05 PROCEDURE — 36415 COLL VENOUS BLD VENIPUNCTURE: CPT

## 2023-01-05 PROCEDURE — 77030002933 HC SUT MCRYL J&J -A: Performed by: ORTHOPAEDIC SURGERY

## 2023-01-05 PROCEDURE — 77030000032 HC CUF TRNQT ZIMM -B: Performed by: ORTHOPAEDIC SURGERY

## 2023-01-05 PROCEDURE — 77030007866 HC KT SPN ANES BBMI -B: Performed by: ANESTHESIOLOGY

## 2023-01-05 PROCEDURE — 74011250637 HC RX REV CODE- 250/637: Performed by: PHYSICIAN ASSISTANT

## 2023-01-05 PROCEDURE — C1713 ANCHOR/SCREW BN/BN,TIS/BN: HCPCS | Performed by: ORTHOPAEDIC SURGERY

## 2023-01-05 PROCEDURE — 97161 PT EVAL LOW COMPLEX 20 MIN: CPT

## 2023-01-05 PROCEDURE — 74011250636 HC RX REV CODE- 250/636: Performed by: ANESTHESIOLOGY

## 2023-01-05 PROCEDURE — 97116 GAIT TRAINING THERAPY: CPT

## 2023-01-05 PROCEDURE — 74011000250 HC RX REV CODE- 250: Performed by: NURSE ANESTHETIST, CERTIFIED REGISTERED

## 2023-01-05 PROCEDURE — 74011000250 HC RX REV CODE- 250: Performed by: ANESTHESIOLOGY

## 2023-01-05 PROCEDURE — 74011250636 HC RX REV CODE- 250/636: Performed by: NURSE ANESTHETIST, CERTIFIED REGISTERED

## 2023-01-05 PROCEDURE — 76210000016 HC OR PH I REC 1 TO 1.5 HR: Performed by: ORTHOPAEDIC SURGERY

## 2023-01-05 DEVICE — IMPL CAPPED KNEE K1 TOTAL/HEMI STD CEMENTED DJO: Type: IMPLANTABLE DEVICE | Status: FUNCTIONAL

## 2023-01-05 DEVICE — EMPOWR 3D KNEETM INS, 8L 13MM, VE
Type: IMPLANTABLE DEVICE | Site: KNEE | Status: FUNCTIONAL
Brand: DJO SURGICAL

## 2023-01-05 DEVICE — CEMENT BNE MED VISC 80 GM GENTAMICIN PALACOS MV+G PRO: Type: IMPLANTABLE DEVICE | Site: KNEE | Status: FUNCTIONAL

## 2023-01-05 DEVICE — EMPOWR 3D KNEETM FEMUR, NP, 8L
Type: IMPLANTABLE DEVICE | Site: KNEE | Status: FUNCTIONAL
Brand: DJO SURGICAL

## 2023-01-05 DEVICE — DOMED TRI-PEG PATELLA, 32X8MM, E-PLUS
Type: IMPLANTABLE DEVICE | Site: KNEE | Status: FUNCTIONAL
Brand: DJO SURGICAL

## 2023-01-05 RX ORDER — SODIUM CHLORIDE 0.9 % (FLUSH) 0.9 %
5-40 SYRINGE (ML) INJECTION AS NEEDED
Status: DISCONTINUED | OUTPATIENT
Start: 2023-01-05 | End: 2023-01-05 | Stop reason: HOSPADM

## 2023-01-05 RX ORDER — MORPHINE SULFATE 2 MG/ML
2 INJECTION, SOLUTION INTRAMUSCULAR; INTRAVENOUS
Status: DISCONTINUED | OUTPATIENT
Start: 2023-01-05 | End: 2023-01-05 | Stop reason: HOSPADM

## 2023-01-05 RX ORDER — ROPIVACAINE HYDROCHLORIDE 5 MG/ML
INJECTION, SOLUTION EPIDURAL; INFILTRATION; PERINEURAL
Status: COMPLETED | OUTPATIENT
Start: 2023-01-05 | End: 2023-01-05

## 2023-01-05 RX ORDER — ASPIRIN 81 MG/1
81 TABLET ORAL 2 TIMES DAILY
Status: DISCONTINUED | OUTPATIENT
Start: 2023-01-05 | End: 2023-01-06 | Stop reason: HOSPADM

## 2023-01-05 RX ORDER — FACIAL-BODY WIPES
10 EACH TOPICAL DAILY PRN
Status: DISCONTINUED | OUTPATIENT
Start: 2023-01-07 | End: 2023-01-06 | Stop reason: HOSPADM

## 2023-01-05 RX ORDER — ACETAMINOPHEN 500 MG
500 TABLET ORAL
Status: DISCONTINUED | OUTPATIENT
Start: 2023-01-05 | End: 2023-01-06 | Stop reason: HOSPADM

## 2023-01-05 RX ORDER — ACETAMINOPHEN 325 MG/1
650 TABLET ORAL ONCE
Status: DISCONTINUED | OUTPATIENT
Start: 2023-01-05 | End: 2023-01-05 | Stop reason: HOSPADM

## 2023-01-05 RX ORDER — OXYCODONE HYDROCHLORIDE 5 MG/1
2.5-5 TABLET ORAL
Qty: 42 TABLET | Refills: 0 | Status: SHIPPED | OUTPATIENT
Start: 2023-01-05 | End: 2023-01-12

## 2023-01-05 RX ORDER — AMOXICILLIN 250 MG
1 CAPSULE ORAL 2 TIMES DAILY
Status: DISCONTINUED | OUTPATIENT
Start: 2023-01-05 | End: 2023-01-06 | Stop reason: HOSPADM

## 2023-01-05 RX ORDER — INSULIN LISPRO 100 [IU]/ML
INJECTION, SOLUTION INTRAVENOUS; SUBCUTANEOUS
Status: DISCONTINUED | OUTPATIENT
Start: 2023-01-05 | End: 2023-01-06 | Stop reason: HOSPADM

## 2023-01-05 RX ORDER — DEXMEDETOMIDINE HYDROCHLORIDE 100 UG/ML
INJECTION, SOLUTION INTRAVENOUS AS NEEDED
Status: DISCONTINUED | OUTPATIENT
Start: 2023-01-05 | End: 2023-01-05 | Stop reason: HOSPADM

## 2023-01-05 RX ORDER — SODIUM CHLORIDE, SODIUM LACTATE, POTASSIUM CHLORIDE, CALCIUM CHLORIDE 600; 310; 30; 20 MG/100ML; MG/100ML; MG/100ML; MG/100ML
INJECTION, SOLUTION INTRAVENOUS
Status: DISCONTINUED | OUTPATIENT
Start: 2023-01-05 | End: 2023-01-05 | Stop reason: HOSPADM

## 2023-01-05 RX ORDER — SODIUM CHLORIDE 9 MG/ML
125 INJECTION, SOLUTION INTRAVENOUS CONTINUOUS
Status: DISPENSED | OUTPATIENT
Start: 2023-01-05 | End: 2023-01-06

## 2023-01-05 RX ORDER — SODIUM CHLORIDE, SODIUM LACTATE, POTASSIUM CHLORIDE, CALCIUM CHLORIDE 600; 310; 30; 20 MG/100ML; MG/100ML; MG/100ML; MG/100ML
1000 INJECTION, SOLUTION INTRAVENOUS CONTINUOUS
Status: DISCONTINUED | OUTPATIENT
Start: 2023-01-05 | End: 2023-01-05 | Stop reason: HOSPADM

## 2023-01-05 RX ORDER — POLYETHYLENE GLYCOL 3350 17 G/17G
17 POWDER, FOR SOLUTION ORAL DAILY
Status: DISCONTINUED | OUTPATIENT
Start: 2023-01-06 | End: 2023-01-06 | Stop reason: HOSPADM

## 2023-01-05 RX ORDER — SODIUM CHLORIDE 0.9 % (FLUSH) 0.9 %
5-40 SYRINGE (ML) INJECTION AS NEEDED
Status: DISCONTINUED | OUTPATIENT
Start: 2023-01-05 | End: 2023-01-06 | Stop reason: HOSPADM

## 2023-01-05 RX ORDER — PROPOFOL 10 MG/ML
INJECTION, EMULSION INTRAVENOUS AS NEEDED
Status: DISCONTINUED | OUTPATIENT
Start: 2023-01-05 | End: 2023-01-05 | Stop reason: HOSPADM

## 2023-01-05 RX ORDER — ROPIVACAINE HYDROCHLORIDE 5 MG/ML
30 INJECTION, SOLUTION EPIDURAL; INFILTRATION; PERINEURAL AS NEEDED
Status: DISCONTINUED | OUTPATIENT
Start: 2023-01-05 | End: 2023-01-05 | Stop reason: HOSPADM

## 2023-01-05 RX ORDER — FENTANYL CITRATE 50 UG/ML
25 INJECTION, SOLUTION INTRAMUSCULAR; INTRAVENOUS
Status: DISCONTINUED | OUTPATIENT
Start: 2023-01-05 | End: 2023-01-05 | Stop reason: HOSPADM

## 2023-01-05 RX ORDER — ONDANSETRON 2 MG/ML
4 INJECTION INTRAMUSCULAR; INTRAVENOUS
Status: DISPENSED | OUTPATIENT
Start: 2023-01-05 | End: 2023-01-06

## 2023-01-05 RX ORDER — AMLODIPINE BESYLATE 5 MG/1
5 TABLET ORAL EVERY EVENING
Status: DISCONTINUED | OUTPATIENT
Start: 2023-01-06 | End: 2023-01-06 | Stop reason: HOSPADM

## 2023-01-05 RX ORDER — PROPOFOL 10 MG/ML
INJECTION, EMULSION INTRAVENOUS
Status: DISCONTINUED | OUTPATIENT
Start: 2023-01-05 | End: 2023-01-05 | Stop reason: HOSPADM

## 2023-01-05 RX ORDER — SODIUM CHLORIDE 0.9 % (FLUSH) 0.9 %
5-40 SYRINGE (ML) INJECTION EVERY 8 HOURS
Status: DISCONTINUED | OUTPATIENT
Start: 2023-01-05 | End: 2023-01-06 | Stop reason: HOSPADM

## 2023-01-05 RX ORDER — ACETAMINOPHEN 500 MG
500 TABLET ORAL EVERY 4 HOURS
Qty: 100 TABLET | Refills: 0 | Status: SHIPPED | OUTPATIENT
Start: 2023-01-05

## 2023-01-05 RX ORDER — SODIUM CHLORIDE 0.9 % (FLUSH) 0.9 %
5-40 SYRINGE (ML) INJECTION EVERY 8 HOURS
Status: DISCONTINUED | OUTPATIENT
Start: 2023-01-05 | End: 2023-01-05 | Stop reason: HOSPADM

## 2023-01-05 RX ORDER — OXYCODONE HYDROCHLORIDE 5 MG/1
5 TABLET ORAL AS NEEDED
Status: DISCONTINUED | OUTPATIENT
Start: 2023-01-05 | End: 2023-01-05 | Stop reason: HOSPADM

## 2023-01-05 RX ORDER — MAGNESIUM SULFATE 100 %
4 CRYSTALS MISCELLANEOUS AS NEEDED
Status: DISCONTINUED | OUTPATIENT
Start: 2023-01-05 | End: 2023-01-06 | Stop reason: HOSPADM

## 2023-01-05 RX ORDER — CELECOXIB 200 MG/1
200 CAPSULE ORAL ONCE
Status: COMPLETED | OUTPATIENT
Start: 2023-01-05 | End: 2023-01-05

## 2023-01-05 RX ORDER — NALOXONE HYDROCHLORIDE 0.4 MG/ML
0.4 INJECTION, SOLUTION INTRAMUSCULAR; INTRAVENOUS; SUBCUTANEOUS AS NEEDED
Status: DISCONTINUED | OUTPATIENT
Start: 2023-01-05 | End: 2023-01-06 | Stop reason: HOSPADM

## 2023-01-05 RX ORDER — ROSUVASTATIN CALCIUM 10 MG/1
20 TABLET, COATED ORAL
Status: DISCONTINUED | OUTPATIENT
Start: 2023-01-05 | End: 2023-01-06 | Stop reason: HOSPADM

## 2023-01-05 RX ORDER — ACETAMINOPHEN 500 MG
1000 TABLET ORAL ONCE
Status: COMPLETED | OUTPATIENT
Start: 2023-01-05 | End: 2023-01-05

## 2023-01-05 RX ORDER — LIDOCAINE HYDROCHLORIDE 10 MG/ML
INJECTION, SOLUTION EPIDURAL; INFILTRATION; INTRACAUDAL; PERINEURAL
Status: COMPLETED | OUTPATIENT
Start: 2023-01-05 | End: 2023-01-05

## 2023-01-05 RX ORDER — PREGABALIN 75 MG/1
75 CAPSULE ORAL ONCE
Status: COMPLETED | OUTPATIENT
Start: 2023-01-05 | End: 2023-01-05

## 2023-01-05 RX ORDER — ONDANSETRON 2 MG/ML
4 INJECTION INTRAMUSCULAR; INTRAVENOUS AS NEEDED
Status: DISCONTINUED | OUTPATIENT
Start: 2023-01-05 | End: 2023-01-05 | Stop reason: HOSPADM

## 2023-01-05 RX ORDER — HYDROMORPHONE HYDROCHLORIDE 1 MG/ML
0.2 INJECTION, SOLUTION INTRAMUSCULAR; INTRAVENOUS; SUBCUTANEOUS
Status: DISCONTINUED | OUTPATIENT
Start: 2023-01-05 | End: 2023-01-05 | Stop reason: HOSPADM

## 2023-01-05 RX ORDER — LOSARTAN POTASSIUM 50 MG/1
100 TABLET ORAL EVERY EVENING
Status: DISCONTINUED | OUTPATIENT
Start: 2023-01-06 | End: 2023-01-06 | Stop reason: HOSPADM

## 2023-01-05 RX ORDER — OXYCODONE HYDROCHLORIDE 5 MG/1
2.5 TABLET ORAL
Status: DISCONTINUED | OUTPATIENT
Start: 2023-01-05 | End: 2023-01-06

## 2023-01-05 RX ORDER — HYDROMORPHONE HYDROCHLORIDE 1 MG/ML
0.5 INJECTION, SOLUTION INTRAMUSCULAR; INTRAVENOUS; SUBCUTANEOUS
Status: ACTIVE | OUTPATIENT
Start: 2023-01-05 | End: 2023-01-06

## 2023-01-05 RX ORDER — HYDROXYZINE HYDROCHLORIDE 10 MG/1
10 TABLET, FILM COATED ORAL
Status: DISCONTINUED | OUTPATIENT
Start: 2023-01-05 | End: 2023-01-06 | Stop reason: HOSPADM

## 2023-01-05 RX ORDER — MIDAZOLAM HYDROCHLORIDE 1 MG/ML
1 INJECTION, SOLUTION INTRAMUSCULAR; INTRAVENOUS AS NEEDED
Status: DISCONTINUED | OUTPATIENT
Start: 2023-01-05 | End: 2023-01-05 | Stop reason: HOSPADM

## 2023-01-05 RX ORDER — OXYCODONE HYDROCHLORIDE 5 MG/1
5 TABLET ORAL
Status: DISCONTINUED | OUTPATIENT
Start: 2023-01-05 | End: 2023-01-06

## 2023-01-05 RX ORDER — KETOROLAC TROMETHAMINE 30 MG/ML
15 INJECTION, SOLUTION INTRAMUSCULAR; INTRAVENOUS EVERY 6 HOURS
Status: COMPLETED | OUTPATIENT
Start: 2023-01-05 | End: 2023-01-06

## 2023-01-05 RX ORDER — LIDOCAINE HYDROCHLORIDE 10 MG/ML
0.1 INJECTION, SOLUTION EPIDURAL; INFILTRATION; INTRACAUDAL; PERINEURAL AS NEEDED
Status: DISCONTINUED | OUTPATIENT
Start: 2023-01-05 | End: 2023-01-05 | Stop reason: HOSPADM

## 2023-01-05 RX ORDER — TRANEXAMIC ACID 100 MG/ML
INJECTION, SOLUTION INTRAVENOUS AS NEEDED
Status: DISCONTINUED | OUTPATIENT
Start: 2023-01-05 | End: 2023-01-05 | Stop reason: HOSPADM

## 2023-01-05 RX ORDER — AMOXICILLIN 250 MG
1 CAPSULE ORAL
Qty: 60 TABLET | Refills: 0 | Status: SHIPPED | OUTPATIENT
Start: 2023-01-05

## 2023-01-05 RX ORDER — FENTANYL CITRATE 50 UG/ML
50 INJECTION, SOLUTION INTRAMUSCULAR; INTRAVENOUS AS NEEDED
Status: DISCONTINUED | OUTPATIENT
Start: 2023-01-05 | End: 2023-01-05 | Stop reason: HOSPADM

## 2023-01-05 RX ORDER — ASPIRIN 81 MG/1
81 TABLET ORAL 2 TIMES DAILY
Qty: 60 TABLET | Refills: 0 | Status: SHIPPED | OUTPATIENT
Start: 2023-01-05

## 2023-01-05 RX ADMIN — ROSUVASTATIN 20 MG: 10 TABLET, FILM COATED ORAL at 21:48

## 2023-01-05 RX ADMIN — PREGABALIN 75 MG: 75 CAPSULE ORAL at 09:16

## 2023-01-05 RX ADMIN — SODIUM CHLORIDE 125 ML/HR: 9 INJECTION, SOLUTION INTRAVENOUS at 21:48

## 2023-01-05 RX ADMIN — FENTANYL CITRATE 100 MCG: 50 INJECTION, SOLUTION INTRAMUSCULAR; INTRAVENOUS at 09:33

## 2023-01-05 RX ADMIN — TRANEXAMIC ACID 1 G: 100 INJECTION, SOLUTION INTRAVENOUS at 10:00

## 2023-01-05 RX ADMIN — ACETAMINOPHEN 500 MG: 500 TABLET, FILM COATED ORAL at 17:54

## 2023-01-05 RX ADMIN — ASPIRIN 81 MG: 81 TABLET, COATED ORAL at 17:54

## 2023-01-05 RX ADMIN — MEPIVACAINE HYDROCHLORIDE 52.5 MG: 15 INJECTION, SOLUTION EPIDURAL; INFILTRATION at 09:57

## 2023-01-05 RX ADMIN — ACETAMINOPHEN 1000 MG: 500 TABLET, FILM COATED ORAL at 09:16

## 2023-01-05 RX ADMIN — CELECOXIB 200 MG: 200 CAPSULE ORAL at 09:16

## 2023-01-05 RX ADMIN — PHENYLEPHRINE HYDROCHLORIDE 40 MCG/MIN: 10 INJECTION INTRAVENOUS at 09:59

## 2023-01-05 RX ADMIN — WATER 2 G: 1 INJECTION INTRAMUSCULAR; INTRAVENOUS; SUBCUTANEOUS at 10:00

## 2023-01-05 RX ADMIN — DEXMEDETOMIDINE HYDROCHLORIDE 10 MCG: 100 INJECTION, SOLUTION, CONCENTRATE INTRAVENOUS at 10:16

## 2023-01-05 RX ADMIN — ONDANSETRON HYDROCHLORIDE 4 MG: 2 SOLUTION INTRAMUSCULAR; INTRAVENOUS at 16:17

## 2023-01-05 RX ADMIN — HYDROMORPHONE HYDROCHLORIDE 0.2 MG: 1 INJECTION, SOLUTION INTRAMUSCULAR; INTRAVENOUS; SUBCUTANEOUS at 13:40

## 2023-01-05 RX ADMIN — SODIUM CHLORIDE, POTASSIUM CHLORIDE, SODIUM LACTATE AND CALCIUM CHLORIDE 1000 ML: 600; 310; 30; 20 INJECTION, SOLUTION INTRAVENOUS at 09:11

## 2023-01-05 RX ADMIN — PROPOFOL 50 MG: 10 INJECTION, EMULSION INTRAVENOUS at 09:59

## 2023-01-05 RX ADMIN — SODIUM CHLORIDE, PRESERVATIVE FREE 10 ML: 5 INJECTION INTRAVENOUS at 17:55

## 2023-01-05 RX ADMIN — MIDAZOLAM 2 MG: 1 INJECTION INTRAMUSCULAR; INTRAVENOUS at 09:33

## 2023-01-05 RX ADMIN — PROPOFOL 50 MCG/KG/MIN: 10 INJECTION, EMULSION INTRAVENOUS at 09:59

## 2023-01-05 RX ADMIN — ONDANSETRON HYDROCHLORIDE 4 MG: 2 SOLUTION INTRAMUSCULAR; INTRAVENOUS at 13:23

## 2023-01-05 RX ADMIN — OXYCODONE HYDROCHLORIDE 5 MG: 5 TABLET ORAL at 21:48

## 2023-01-05 RX ADMIN — WATER 2 G: 1 INJECTION INTRAMUSCULAR; INTRAVENOUS; SUBCUTANEOUS at 17:54

## 2023-01-05 RX ADMIN — DEXMEDETOMIDINE HYDROCHLORIDE 10 MCG: 100 INJECTION, SOLUTION, CONCENTRATE INTRAVENOUS at 09:59

## 2023-01-05 RX ADMIN — SENNOSIDES AND DOCUSATE SODIUM 1 TABLET: 50; 8.6 TABLET ORAL at 17:54

## 2023-01-05 RX ADMIN — SODIUM CHLORIDE 125 ML/HR: 9 INJECTION, SOLUTION INTRAVENOUS at 12:27

## 2023-01-05 RX ADMIN — LIDOCAINE HYDROCHLORIDE 10 MG: 10 INJECTION, SOLUTION EPIDURAL; INFILTRATION; INTRACAUDAL; PERINEURAL at 09:52

## 2023-01-05 RX ADMIN — ACETAMINOPHEN 500 MG: 500 TABLET, FILM COATED ORAL at 21:48

## 2023-01-05 RX ADMIN — SODIUM CHLORIDE, POTASSIUM CHLORIDE, SODIUM LACTATE AND CALCIUM CHLORIDE: 600; 310; 30; 20 INJECTION, SOLUTION INTRAVENOUS at 09:41

## 2023-01-05 RX ADMIN — ROPIVACAINE HYDROCHLORIDE 30 ML: 5 INJECTION, SOLUTION EPIDURAL; INFILTRATION; PERINEURAL at 09:39

## 2023-01-05 RX ADMIN — OXYCODONE HYDROCHLORIDE 2.5 MG: 5 TABLET ORAL at 16:17

## 2023-01-05 RX ADMIN — PROPOFOL 30 MG: 10 INJECTION, EMULSION INTRAVENOUS at 10:16

## 2023-01-05 RX ADMIN — KETOROLAC TROMETHAMINE 15 MG: 30 INJECTION, SOLUTION INTRAMUSCULAR; INTRAVENOUS at 18:01

## 2023-01-05 RX ADMIN — HYDROMORPHONE HYDROCHLORIDE 0.2 MG: 1 INJECTION, SOLUTION INTRAMUSCULAR; INTRAVENOUS; SUBCUTANEOUS at 13:22

## 2023-01-05 NOTE — PROGRESS NOTES
Problem: Mobility Impaired (Adult and Pediatric)  Goal: *Acute Goals and Plan of Care (Insert Text)  Description: FUNCTIONAL STATUS PRIOR TO ADMISSION: Patient was independent and active without use of DME.    HOME SUPPORT PRIOR TO ADMISSION: The patient lived with  (also daughter)  but did not require assist.    Physical Therapy Goals  Initiated 1/5/2023    1. Patient will move from supine to sit and sit to supine  and scoot up and down in bed with modified independence within 4 days. 2. Patient will perform sit to stand with modified independence within 4 days. 3. Patient will ambulate with modified independence for > 150 feet with the least restrictive device within 4 days. 4. Patient will ascend/descend 4 stairs with one handrail(s) with supervision within 4 days. 5. Patient will perform home exercise program per protocol with supervision/set-up within 4 days. 6. Patient will demonstrate AROM 0-90 degrees in operative joint within 4 days. PHYSICAL THERAPY EVALUATION  Patient: Josh Grimes (62 y.o. female)  Date: 1/5/2023  Primary Diagnosis: Primary osteoarthritis of left knee [M17.12]  Degenerative arthritis of left knee [M17.12]  Procedure(s) (LRB):  LEFT TOTAL KNEE ARTHROPLASTY (W/BLOCK W/IV SEDATION) (Left) Day of Surgery   Precautions:   WBAT    ASSESSMENT  Based on the objective data described below, the patient presents POD # 0 left TKR with pain leftt knee, decreased AROM/strength and function left leg, decreased activity tolerance, decline in functional mobility and impaired standing balance/gait with RW. Pt mobilized easily with no c/o's dizziness. Anticipate pt will be able to increase amb distance, advance exercise and perform stairs during am session on 1/5 and be cleared for early discharge from PT standpoint.      Current Level of Function Impacting Discharge (mobility/balance): Standby guard to min assist x 1 for supine to/from sit; CGA for amb with RW    Functional Outcome Measure: The patient scored 50/100 on the Barthel Index outcome measure. Other factors to consider for discharge: will have support from family members at discharge     Patient will benefit from skilled therapy intervention to address the above noted impairments. PLAN :  Recommendations and Planned Interventions: bed mobility training, transfer training, gait training, therapeutic exercises, patient and family training/education, and therapeutic activities      Frequency/Duration: Patient will be followed by physical therapy:  twice daily to address goals. Recommendation for discharge: (in order for the patient to meet his/her long term goals)  Physical therapy at least 2 days/week in the home     This discharge recommendation:  Has been made in collaboration with the attending provider and/or case management    IF patient discharges home will need the following DME: patient owns DME required for discharge         SUBJECTIVE:   Patient stated I want to go home tomorrow.     OBJECTIVE DATA SUMMARY:   HISTORY:    Past Medical History:   Diagnosis Date    Arrhythmia     HEART PALPITATIONS OCCASIONALLY    Arthritis     Chronic pain 2016    LEFT KNEE    Colon polyps     hyperplastic    Hypercholesterolemia     Hypertension     Nausea & vomiting     Psychiatric disorder     ANXIETY AND DEPRESSION     Past Surgical History:   Procedure Laterality Date    ENDOSCOPY, COLON, DIAGNOSTIC  08/2009    HX CAROTID ENDARTERECTOMY Right 07/01/2019    Dr. Sid Trevino    HX HEENT      T&A (childhood)    HX HERNIA REPAIR      umbilical    HX ORTHOPAEDIC Left 2016    MENISCUS REPAIR    HX ORTHOPAEDIC Right     JOINT SURGERY ON MIDDLE FINGER TO REMOVE A GROWTH-PT DENIES    HX TUBAL LIGATION  1990       Personal factors and/or comorbidities impacting plan of care: as above    Home Situation  Home Environment: Private residence  # Steps to Enter: 6  Rails to Enter: Yes  Hand Rails : Right  Wheelchair Ramp: No  One/Two Story Residence: Other (Comment) (3 sotry. live on 1st floor)  Interior Rails: None  Lift Chair Available: No  Living Alone: No  Support Systems: Spouse/Significant Other, Other Family Member(s)  Patient Expects to be Discharged to[de-identified] Home with home health  Current DME Used/Available at Home: Ronnald Real, Grab bars, Raised toilet seat, Cane, straight, Shower chair  Tub or Shower Type: Shower    EXAMINATION/PRESENTATION/DECISION MAKING:   Critical Behavior:  Neurologic State: Alert  Orientation Level: Oriented X4  Cognition: Appropriate decision making, Appropriate safety awareness  Safety/Judgement: Awareness of environment, Good awareness of safety precautions  Hearing: Auditory  Auditory Impairment: None  Hearing Aids/Status: Does not own  Skin:  Left knee covered with ace wrap - no drainage noted    Range Of Motion:  AROM: Within functional limits (except left leg)                       Strength:    Strength: Within functional limits (except left leg)                    Tone & Sensation:   Tone: Normal              Sensation: Intact               Coordination:  Coordination: Within functional limits  Functional Mobility:  Bed Mobility:     Supine to Sit: Stand-by assistance  Sit to Supine: Minimum assistance  Scooting: Stand-by assistance  Transfers:  Sit to Stand: Contact guard assistance; Additional time  Stand to Sit: Contact guard assistance; Additional time  Stand Pivot Transfers: Contact guard assistance; Adaptive equipment; Additional time;Assist x1                    Balance:   Sitting: Intact; Without support  Standing: Impaired; With support  Standing - Static: Good;Constant support  Standing - Dynamic : Fair;Constant support (requires bilateral support of RW)  Ambulation/Gait Training:  Distance (ft): 50 Feet (ft)  Assistive Device: Walker, rolling;Gait belt  Ambulation - Level of Assistance: Contact guard assistance; Adaptive equipment; Additional time;Assist x1        Gait Abnormalities: Decreased step clearance  Right Side Weight Bearing: Full  Left Side Weight Bearing: As tolerated  Base of Support: Center of gravity altered;Shift to right  Stance: Left decreased  Speed/Amairani: Slow  Step Length: Right shortened;Left shortened  Swing Pattern: Left asymmetrical          Therapeutic Exercises:   Pt instructed and performed ankle pumps and demonstrated proper use of incentive spirometer - to be performed x 10 reps once hr when awake. Pt instructed and performed quad sets, hamstring sets and heel slides - to be performed 3 - 5 reps once hr when awake as tolerated. Written instructions provided on pt's communication board. Functional Measure:  Barthel Index:    Bathin  Bladder: 5  Bowels: 10  Groomin  Dressin  Feeding: 10  Mobility: 0  Stairs: 0  Toilet Use: 5  Transfer (Bed to Chair and Back): 10  Total: 50/100       The Barthel ADL Index: Guidelines  1. The index should be used as a record of what a patient does, not as a record of what a patient could do. 2. The main aim is to establish degree of independence from any help, physical or verbal, however minor and for whatever reason. 3. The need for supervision renders the patient not independent. 4. A patient's performance should be established using the best available evidence. Asking the patient, friends/relatives and nurses are the usual sources, but direct observation and common sense are also important. However direct testing is not needed. 5. Usually the patient's performance over the preceding 24-48 hours is important, but occasionally longer periods will be relevant. 6. Middle categories imply that the patient supplies over 50 per cent of the effort. 7. Use of aids to be independent is allowed. Score Interpretation (from [address] [place] [address])    Independent   60-79 Minimally independent   40-59 Partially dependent   20-39 Very dependent   <20 Totally dependent     -Mendoza Draper, Barthel, DChanceW. (1965). Functional evaluation: the Barthel Index.  [facility] ([address] (1997). The Barthel activities of daily living index: self-reporting versus actual performance in the old (> or = 75 years). Journal [address] 45(7), 14 [facility], EMILIANA, Sasha Moss., Jarvis Matthews. (1999). Measuring the change in disability after inpatient rehabilitation; comparison of the responsiveness of the Barthel Index and Functional [facility] Measure. Journal of Neurology, Neurosurgery, and Psychiatry, 66(4), 994-186. Marisela GONZÁLEZ Montelongo, NCIK Arreaga, & August Fernandez M.A. (2004) Assessment of post-stroke quality of life in cost-effectiveness studies: The usefulness of the Barthel Index and the EuroQoL-5D. Quality of Life Research, 15, 579-78           Physical Therapy Evaluation Charge Determination   History Examination Presentation Decision-Making   LOW Complexity : Zero comorbidities / personal factors that will impact the outcome / POC LOW Complexity : 1-2 Standardized tests and measures addressing body structure, function, activity limitation and / or participation in recreation  LOW Complexity : Stable, uncomplicated  LOW Complexity : FOTO score of       Based on the above components, the patient evaluation is determined to be of the following complexity level: LOW     Pain Rating:  Left knee 4/10 - ice applied to left knee    Activity Tolerance:   Good - POD# 0 - mobilized without difficulty     After treatment patient left in no apparent distress:   Supine in bed, Call bell within reach, Caregiver / family present, Side rails x 3    COMMUNICATION/EDUCATION:   The patients plan of care was discussed with: Registered nurse and NP - Miranda Eller . Fall prevention education was provided and the patient/caregiver indicated understanding., Patient/family have participated as able in goal setting and plan of care. , and Patient/family agree to work toward stated goals and plan of care.    Thank you for this referral.  Sai Shin, PT   Time Calculation: 40 mins

## 2023-01-05 NOTE — DISCHARGE SUMMARY
[address] [place] [address] Drive SUMMARY     Name: Emely Desai       MR#: [id number]    : 1945  ADMIT DATE: 2023  DISCHARGE DATE: 2023     ADMISSION DIAGNOSIS: Primary osteoarthritis of left knee [M17.12]  Degenerative arthritis of left knee [M17.12]     DISCHARGE DIAGNOSIS: Primary osteoarthritis of left knee [M17.12]     PROCEDURE PERFORMED: Procedure(s):  LEFT TOTAL KNEE ARTHROPLASTY (W/BLOCK W/IV SEDATION)     CONSULTATIONS:  None. HISTORY OF PRESENT ILLNESS: The patient is a 59-year-old female with progressive left knee pain due to severe osteoarthritis. Symptoms have progressed despite comprehensive conservative treatment and she presents for left total knee replacement. Risks, benefits, alternatives of procedure were reviewed with her in detail and she desires to proceed. HOSPITAL COURSE:  The patient underwent the aforementioned procedure on date of admission under spinal anesthesia with adductor canal block. There were no immediate postoperative complications. She was started on a multimodal pain regimen and DVT prophylaxis. DISPOSITION: The patient made slow, steady progress with physical therapy and was appropriate for discharge to Home in stable condition on postoperative day 1. DISCHARGE MEDICATIONS:  Reinitiate preadmission medications. In addition, the patient will be on ASA for DVT prophylaxis and low dose oxycodone and Tylenol for pain. DISCHARGE INSTRUCTIONS:  Detailed printed instructions were provided to the patient. Follow up with Dr. Ailyn Shane in approximately 3 weeks. The patient will receive home health physical therapy in the meantime.     Signed by: Yehuda Humphrey PA-C  2023

## 2023-01-05 NOTE — ANESTHESIA PREPROCEDURE EVALUATION
Relevant Problems   No relevant active problems       Anesthetic History   No history of anesthetic complications  PONV          Review of Systems / Medical History  Patient summary reviewed, nursing notes reviewed and pertinent labs reviewed    Pulmonary  Within defined limits                 Neuro/Psych   Within defined limits           Cardiovascular  Within defined limits  Hypertension        Dysrhythmias   Hyperlipidemia    Exercise tolerance: >4 METS     GI/Hepatic/Renal  Within defined limits              Endo/Other  Within defined limits      Morbid obesity and arthritis     Other Findings              Physical Exam    Airway  Mallampati: II  TM Distance: > 6 cm  Neck ROM: normal range of motion   Mouth opening: Normal     Cardiovascular  Regular rate and rhythm,  S1 and S2 normal,  no murmur, click, rub, or gallop             Dental  No notable dental hx       Pulmonary  Breath sounds clear to auscultation               Abdominal  GI exam deferred       Other Findings            Anesthetic Plan    ASA: 3  Anesthesia type: spinal      Post-op pain plan if not by surgeon: peripheral nerve block single    Induction: Intravenous  Anesthetic plan and risks discussed with: Patient

## 2023-01-05 NOTE — DISCHARGE INSTRUCTIONS
Post-op Discharge Instructions Following Total Joint Replacement  Nelia Rosales MD  [address]  [phone]  Follow-up Office Visit  See Dr. Sherryle Pollard approximately 3-4 weeks from date of surgery. Call [phone] to make an appointment. Call Korey Conde LPN if you have questions or concerns, [phone]. Activity  Use your walker for ambulation. Weight bearing as tolerated unless instructed otherwise by the physical therapist. Get up every hour you are awake and take a brief walk. Lengthen walking distance daily as your strength improves. Continue using your walker until seen in the office for your first follow up visit. Practice your exercises 3 times daily as instructed by the physical therapist. Sharath Freitas for 20 minutes after exercising. No driving until seen in the office for your first follow up visit. Incision Care  The light brown Aquacel surgical dressing is waterproof and is to remain on your incision for 7 days. On the 7th day, carefully lift the edge of the dressing to break the adhesive seal and gently peel it off. If your Aquacel dressings comes loose or falls off before the 7th day, replace it with a dry sterile gauze dressing and change this dressing daily. Once there is no drainage on the bandage, you mean leave the incision open to air. You may take a shower with the Aquacel dressing in place. After you remove the Aquacel dressing on day 7, you may continue to shower and get your incision wet in the shower. Do not submerge your incision under water in a bathtub, hot tub, swimming pool, etc. until after you have been evaluated at your first office visit. Medications  Blood Clot Prevention: Take medication as prescribed by your physician for 4 weeks postop. Pain Management: Take pain medication as prescribed; wean yourself off of pain medication as your pain lessens. Take with food. You make also take Tylenol every 4-6 hours as needed for pain.   Do not exceed 3 grams (3000mg) per day. Place an ice bag on or around the incision for 20 minutes on / 20 minutes off as needed throughout the day and night, especially after exercising. Stool Softener: You may want to take a stool softener (such as Senokot-S or Colace) to prevent constipation while taking pain medication. If constipation occurs, you may also use a laxative (such as Dulcolax tablets, Miralax, or a suppository). Diet  Resume usual diet at home. Drink plenty of fluids. Eat foods high in fiber and protein. Calcium and Vitamin D supplements recommended. Avoid alcoholic beverages. No smoking. When to call your Orthopaedic Surgeon: If you call after 5pm or on a weekend, the on call physician will return your call  Pain that is not relieved by pain medication, ice, and activity modification  Signs of infection (red incision, continuous drainage from the incision, malodorous drainage, persistent fever greater than 101 degrees Fahrenheit)  Signs of a blood clot in your leg (calf pain, tenderness, redness, and/or swelling of the lower leg)  ?   When to call your Primary Care Physician  Concerns about your medical conditions such as diabetes, high blood pressure, asthma, congestive heart failure  Call if blood sugars are elevated, if you have a persistent headache or dizziness, coughing or congestion, constipation or diarrhea, burning with urination, abnormal heart rate (fast or slow)  When to call 911 and go to the nearest Emergency Room  Acute onset of chest pain, shortness of breath, difficulty breathing

## 2023-01-05 NOTE — ANESTHESIA PROCEDURE NOTES
Peripheral Block    Start time: 1/5/2023 9:28 AM  End time: 1/5/2023 9:39 AM  Performed by: Rogelio Jama MD  Authorized by: Rogelio Jama MD       Pre-procedure: Indications: at surgeon's request and post-op pain management    Preanesthetic Checklist: patient identified, risks and benefits discussed, site marked, timeout performed, anesthesia consent given and patient being monitored      Block Type:   Block Type:   Adductor canal  Monitoring:  Standard ASA monitoring, responsive to questions, oxygen, continuous pulse ox, frequent vital sign checks and heart rate  Injection Technique:  Single shot  Procedures: ultrasound guided    Patient Position: supine  Prep: chlorhexidine    Location:  Mid thigh  Needle Type:  Stimuplex  Needle Gauge:  22 G  Needle Localization:  Ultrasound guidance  Medication Injected:  Ropivacaine (PF) (NAROPIN)(0.5%) 5 mg/mL injection - Peripheral Nerve Block   30 mL - 1/5/2023 9:39:00 AM  Med Admin Time: 1/5/2023 9:39 AM    Assessment:  Number of attempts:  1  Injection Assessment:  Incremental injection every 5 mL, local visualized surrounding nerve on ultrasound, negative aspiration for blood, no intravascular symptoms, no paresthesia and ultrasound image on chart  Patient tolerance:  Patient tolerated the procedure well with no immediate complications

## 2023-01-05 NOTE — ANESTHESIA POSTPROCEDURE EVALUATION
Procedure(s):  LEFT TOTAL KNEE ARTHROPLASTY (W/BLOCK W/IV SEDATION). spinal, MAC    Anesthesia Post Evaluation        Patient location during evaluation: PACU  Note status: Adequate. Level of consciousness: responsive to verbal stimuli and sleepy but conscious  Pain management: satisfactory to patient  Airway patency: patent  Anesthetic complications: no  Cardiovascular status: acceptable  Respiratory status: acceptable  Hydration status: acceptable  Comments: +Post-Anesthesia Evaluation and Assessment    Patient: Sage Toure MRN: [id number]  SSN: [id number]   YOB: 1945  Age: 78 y.o. Sex: female          Cardiovascular Function/Vital Signs    BP (!) 144/65   Pulse 63   Temp 36.3 °C (97.4 °F)   Resp 21   Ht 5' 5\" (1.651 m)   Wt 95.7 kg (210 lb 15.7 oz)   SpO2 96%   BMI 35.11 kg/m²     Patient is status post Procedure(s):  LEFT TOTAL KNEE ARTHROPLASTY (W/BLOCK W/IV SEDATION). Nausea/Vomiting: Controlled. Postoperative hydration reviewed and adequate. Pain:  Pain Scale 1: Numeric (0 - 10) (01/05/23 0839)  Pain Intensity 1: 0 (01/05/23 0839)   Managed. Neurological Status:   Neuro (WDL): Within Defined Limits (01/05/23 0853)   At baseline. Mental Status and Level of Consciousness: Arousable. Pulmonary Status:   O2 Device: CO2 nasal cannula (01/05/23 1218)   Adequate oxygenation and airway patent. Complications related to anesthesia: None    Post-anesthesia assessment completed. No concerns. I have evaluated the patient and the patient is stable and ready to be discharged from PACU . Signed By: Melody Otero MD    1/5/2023        INITIAL Post-op Vital signs:   Vitals Value Taken Time   /59 01/05/23 1235   Temp 36.3 °C (97.4 °F) 01/05/23 1218   Pulse 61 01/05/23 1241   Resp 16 01/05/23 1241   SpO2 95 % 01/05/23 1241   Vitals shown include unvalidated device data.

## 2023-01-05 NOTE — ANESTHESIA PROCEDURE NOTES
Spinal Block    Start time: 1/5/2023 9:50 AM  End time: 1/5/2023 9:57 AM  Performed by: Avelino Velazquez CRNA  Authorized by: Ryne Romeo MD     Pre-procedure:   Indications: primary anesthetic  Preanesthetic Checklist: patient identified, risks and benefits discussed, anesthesia consent, site marked, patient being monitored, timeout performed and fire risk safety assessment completed and verbalized    Timeout Time: 09:50 EST      Spinal Block:   Patient Position:  Seated  Prep Region:  Lumbar  Prep: Betadine      Location:  L2-3    Local: lidocaine (PF) (XYLOCAINE) 10 mg/mL (1 %) IntraDERMAL - IntraDERMal, Back   10 mg - 1/5/2023 9:52:00 AM  mepivacaine-pf (CARBOCAINE-PF) 1.5% PF injection - Other, Spine   52.5 mg - 1/5/2023 9:57:00 AM  Local Dose (mL):  3.5  Med Admin Time: 1/5/2023 9:57 AM    Needle:   Needle Type:  Pencil-tip  Needle Gauge:  24 G  Attempts:  2 (midline, then paramedian)      Events: CSF confirmed, no blood with aspiration and no paresthesia        Assessment:  Insertion:  Uncomplicated  Patient tolerance:  Patient tolerated the procedure well with no immediate complications

## 2023-01-05 NOTE — BRIEF OP NOTE
Brief Postoperative Note    Patient: Monisha Persaud  YOB: 1945  MRN: [id number]    Date of Procedure: 1/5/2023     Pre-Op Diagnosis: Primary osteoarthritis of left knee [M17.12]    Post-Op Diagnosis: Same as preoperative diagnosis. Procedure(s):  LEFT TOTAL KNEE ARTHROPLASTY (W/BLOCK W/IV SEDATION)    Surgeon(s):  Tam Narayan MD    Surgical Assistant: Physician Assistant: Yifan Fraga PA-C  Surg Asst-1: Ahsan Royal    Anesthesia: Spinal     Estimated Blood Loss (mL): 362     Complications: None    Specimens: * No specimens in log *     Implants:   Implant Name Type Inv.  Item Serial No.  Lot No. LRB No. Used Action   CEMENT BNE MED VISC 80 GM GENTAMICIN PALACOS MV+G PRO - SNA  CEMENT BNE MED VISC 80 GM GENTAMICIN PALACOS MV+G PRO NA [employer]_ [id number] Left 1 Implanted   COMPONENT FEM SZ 8 LT KNEE NP EMPOWR 3D - SNA  COMPONENT FEM SZ 8 LT KNEE NP EMPOWR 3D NA [employer] - [employer]_ [id number] Left 1 Implanted   COMPONENT TIB SZ -8 LT KNEE NP STEMMABLE EMPOWR - SNA  COMPONENT TIB SZ -8 LT KNEE NP STEMMABLE EMPOWR NA [employer] - [employer] SURGICAL_ [id number] Left 1 Implanted   COMPONENT PATELLAR 3 PEG 32X8 MM KNEE DOMED POLYETH E-PLUS - SNA  COMPONENT PATELLAR 3 PEG 32X8 MM KNEE DOMED POLYETH E-PLUS NA [employer] - [employer]O SURGICAL_ [id number] Left 1 Implanted   Finned Baseplate, Nonporous, Sz 8, Left, Minus   NA [employer] [id number] Left 1 Implanted       Drains: * No LDAs found *    Findings: oa left knee    Electronically Signed by Osiris Boss PA-C on 1/5/2023 at 11:15 AM

## 2023-01-05 NOTE — PERIOP NOTES
Updated daughter, Mayda Ortega on cell phone. Awaret hat her mother has a roiom assignment but it is being cleaned at present. Will calll her when room is ready.

## 2023-01-05 NOTE — PERIOP NOTES
TRANSFER - OUT REPORT:    Verbal report given to Bella(name) on Alicia Doty  being transferred to [facility](unit) for routine post - op       Report consisted of patients Situation, Background, Assessment and   Recommendations(SBAR). Time Pre op antibiotic given:1000  Anesthesia Stop time: 2286    Information from the following report(s) Procedure Summary and Accordion was reviewed with the receiving nurse. Opportunity for questions and clarification was provided. Is the patient on 02? NO      Is the patient on a monitor? NO    Is the nurse transporting with the patient? NO    Surgical Waiting Area notified of patient's transfer from PACU? Candice Man on phone and told her pt was on the way to 18      The following personal items collected during your admission accompanied patient upon transfer:   Dental Appliance: Dental Appliances: None  Vision: Visual Aid: Glasses (In PACU)  Hearing Aid:    Jewelry:    Clothing: Clothing:  (In PACU)  Other Valuables:  Other Valuables: Cell Phone, Other (comment), Home Medical Equipment (comment) (Cell phone and all other valuables are w/ pt's daughter and walker in PACU)  Valuables sent to safe:  glasses, walker, and bag of clothing returned to pt in pacu

## 2023-01-06 VITALS
BODY MASS INDEX: 35.15 KG/M2 | DIASTOLIC BLOOD PRESSURE: 93 MMHG | WEIGHT: 210.98 LBS | RESPIRATION RATE: 18 BRPM | HEIGHT: 65 IN | TEMPERATURE: 98.4 F | HEART RATE: 63 BPM | SYSTOLIC BLOOD PRESSURE: 137 MMHG | OXYGEN SATURATION: 96 %

## 2023-01-06 LAB
ANION GAP SERPL CALC-SCNC: 6 MMOL/L (ref 5–15)
BUN SERPL-MCNC: 15 MG/DL (ref 6–20)
BUN/CREAT SERPL: 25 (ref 12–20)
CALCIUM SERPL-MCNC: 8.5 MG/DL (ref 8.5–10.1)
CHLORIDE SERPL-SCNC: 113 MMOL/L (ref 97–108)
CO2 SERPL-SCNC: 23 MMOL/L (ref 21–32)
CREAT SERPL-MCNC: 0.61 MG/DL (ref 0.55–1.02)
GLUCOSE BLD STRIP.AUTO-MCNC: 108 MG/DL (ref 65–117)
GLUCOSE BLD STRIP.AUTO-MCNC: 91 MG/DL (ref 65–117)
GLUCOSE SERPL-MCNC: 98 MG/DL (ref 65–100)
HGB BLD-MCNC: 9.7 G/DL (ref 11.5–16)
POTASSIUM SERPL-SCNC: 4 MMOL/L (ref 3.5–5.1)
SERVICE CMNT-IMP: NORMAL
SERVICE CMNT-IMP: NORMAL
SODIUM SERPL-SCNC: 142 MMOL/L (ref 136–145)

## 2023-01-06 PROCEDURE — 82962 GLUCOSE BLOOD TEST: CPT

## 2023-01-06 PROCEDURE — 80048 BASIC METABOLIC PNL TOTAL CA: CPT

## 2023-01-06 PROCEDURE — 85018 HEMOGLOBIN: CPT

## 2023-01-06 PROCEDURE — G0378 HOSPITAL OBSERVATION PER HR: HCPCS

## 2023-01-06 PROCEDURE — 51798 US URINE CAPACITY MEASURE: CPT

## 2023-01-06 PROCEDURE — 74011250637 HC RX REV CODE- 250/637

## 2023-01-06 PROCEDURE — 74011250637 HC RX REV CODE- 250/637: Performed by: PHYSICIAN ASSISTANT

## 2023-01-06 PROCEDURE — 97116 GAIT TRAINING THERAPY: CPT

## 2023-01-06 PROCEDURE — 2709999900 HC NON-CHARGEABLE SUPPLY

## 2023-01-06 PROCEDURE — 36415 COLL VENOUS BLD VENIPUNCTURE: CPT

## 2023-01-06 PROCEDURE — 74011000250 HC RX REV CODE- 250: Performed by: PHYSICIAN ASSISTANT

## 2023-01-06 PROCEDURE — 77030028907 HC WRP KNEE WO BGS SOLM -B

## 2023-01-06 PROCEDURE — 74011250636 HC RX REV CODE- 250/636: Performed by: PHYSICIAN ASSISTANT

## 2023-01-06 PROCEDURE — 97530 THERAPEUTIC ACTIVITIES: CPT

## 2023-01-06 RX ORDER — OXYCODONE HYDROCHLORIDE 5 MG/1
10 TABLET ORAL
Status: DISCONTINUED | OUTPATIENT
Start: 2023-01-06 | End: 2023-01-06 | Stop reason: HOSPADM

## 2023-01-06 RX ORDER — ONDANSETRON 4 MG/1
4 TABLET, ORALLY DISINTEGRATING ORAL
Qty: 20 TABLET | Refills: 0 | Status: SHIPPED | OUTPATIENT
Start: 2023-01-06

## 2023-01-06 RX ORDER — OXYCODONE HYDROCHLORIDE 5 MG/1
5 TABLET ORAL
Status: DISCONTINUED | OUTPATIENT
Start: 2023-01-06 | End: 2023-01-06 | Stop reason: HOSPADM

## 2023-01-06 RX ADMIN — OXYCODONE HYDROCHLORIDE 5 MG: 5 TABLET ORAL at 06:16

## 2023-01-06 RX ADMIN — ACETAMINOPHEN 500 MG: 500 TABLET, FILM COATED ORAL at 09:34

## 2023-01-06 RX ADMIN — KETOROLAC TROMETHAMINE 15 MG: 30 INJECTION, SOLUTION INTRAMUSCULAR; INTRAVENOUS at 13:47

## 2023-01-06 RX ADMIN — OXYCODONE HYDROCHLORIDE 10 MG: 5 TABLET ORAL at 13:49

## 2023-01-06 RX ADMIN — SODIUM CHLORIDE, PRESERVATIVE FREE 10 ML: 5 INJECTION INTRAVENOUS at 06:16

## 2023-01-06 RX ADMIN — SENNOSIDES AND DOCUSATE SODIUM 1 TABLET: 50; 8.6 TABLET ORAL at 09:34

## 2023-01-06 RX ADMIN — KETOROLAC TROMETHAMINE 15 MG: 30 INJECTION, SOLUTION INTRAMUSCULAR; INTRAVENOUS at 02:09

## 2023-01-06 RX ADMIN — ACETAMINOPHEN 500 MG: 500 TABLET, FILM COATED ORAL at 13:47

## 2023-01-06 RX ADMIN — ASPIRIN 81 MG: 81 TABLET, COATED ORAL at 09:33

## 2023-01-06 RX ADMIN — ACETAMINOPHEN 500 MG: 500 TABLET, FILM COATED ORAL at 06:16

## 2023-01-06 RX ADMIN — POLYETHYLENE GLYCOL 3350 17 G: 17 POWDER, FOR SOLUTION ORAL at 09:34

## 2023-01-06 RX ADMIN — WATER 2 G: 1 INJECTION INTRAMUSCULAR; INTRAVENOUS; SUBCUTANEOUS at 02:10

## 2023-01-06 RX ADMIN — KETOROLAC TROMETHAMINE 15 MG: 30 INJECTION, SOLUTION INTRAMUSCULAR; INTRAVENOUS at 06:16

## 2023-01-06 RX ADMIN — OXYCODONE HYDROCHLORIDE 5 MG: 5 TABLET ORAL at 09:33

## 2023-01-06 RX ADMIN — SODIUM CHLORIDE, PRESERVATIVE FREE 10 ML: 5 INJECTION INTRAVENOUS at 14:00

## 2023-01-06 NOTE — PROGRESS NOTES
Physical Therapy 01/06/2023    Pt seen for discharge training and progression to stairs. Pt reporting Left knee 8/10 - pt was overdue for available pain meds - pt did not notify nursing. Advanced HEP to include seated TKA exercise - knee flexion - demonstration only - daughter also instructed. Reviewed and instructed in proper positioning to avoid external rotation and knee flexion in supine or recliner position - using blanket roll to support leg in neutral position. Reviewed importance of not placing pillow under knee to position knee in prolonged flexion. Instructed pt in proper use of ice and elevation to decrease pain and swelling and progressive walking program as tolerated. Pt/daughter able to verbalize understanding of discharge education. CAT Rees discussed pain control with patient - pt despite pain - pt able to perform stairs with left rail and cane and CGA - daughter present and instructed in proper guarding of patient.       Pt cleared for discharge --    Full note to follow -     Amrita Chatman, PT

## 2023-01-06 NOTE — PROGRESS NOTES
Transition Of Care: The patient plans to discharge home with [facility] and daughter, Pasha Anne to transport when stable for discharge. The patient has a rolling walker. RUR: N/A    The Plan for Transition of Care is related to the following treatment goals: Home Health. The patient has no preference. The Patient was provided with a choice of provider and agrees   with the discharge plan. [x] Yes [] No    Freedom of choice list was provided with basic dialogue that supports the patient's individualized plan of care/goals, treatment preferences and shares the quality data associated with the providers.  [x] Yes [] No     Kenn Hermosillo RN/CRM

## 2023-01-06 NOTE — PROGRESS NOTES
Problem: Mobility Impaired (Adult and Pediatric)  Goal: *Acute Goals and Plan of Care (Insert Text)  Description: FUNCTIONAL STATUS PRIOR TO ADMISSION: Patient was independent and active without use of DME.    HOME SUPPORT PRIOR TO ADMISSION: The patient lived with  (also daughter)  but did not require assist.    Physical Therapy Goals  Initiated 1/5/2023    1. Patient will move from supine to sit and sit to supine  and scoot up and down in bed with modified independence within 4 days. 2. Patient will perform sit to stand with modified independence within 4 days. 3. Patient will ambulate with modified independence for > 150 feet with the least restrictive device within 4 days. 4. Patient will ascend/descend 4 stairs with one handrail(s) with supervision within 4 days. 5. Patient will perform home exercise program per protocol with supervision/set-up within 4 days. 6. Patient will demonstrate AROM 0-90 degrees in operative joint within 4 days. Outcome: Progressing Towards Goal   PHYSICAL THERAPY TREATMENT  Patient: Keesha Arrieta (35 y.o. female)  Date: 1/6/2023  Diagnosis: Primary osteoarthritis of left knee [M17.12]  Degenerative arthritis of left knee [M17.12] <principal problem not specified>  Procedure(s) (LRB):  LEFT TOTAL KNEE ARTHROPLASTY (W/BLOCK W/IV SEDATION) (Left) 1 Day Post-Op  Precautions: WBAT  Chart, physical therapy assessment, plan of care and goals were reviewed. ASSESSMENT  Patient continues with skilled PT services and is progressing towards goals. Pt seen for discharge training and progression to stairs. Pt reporting Left knee 8/10 - pt was overdue for available pain meds - pt did not notify nursing. Advanced HEP to include seated TKA exercise - knee flexion - demonstration only - daughter also instructed.   Reviewed and instructed in proper positioning to avoid external rotation and knee flexion in supine or recliner position - using blanket roll to support leg in neutral position. Reviewed importance of not placing pillow under knee to position knee in prolonged flexion. Instructed pt in proper use of ice and elevation to decrease pain and swelling and progressive walking program as tolerated. Pt/daughter able to verbalize understanding of discharge education. CAT Drew discussed pain control with patient - pt despite pain cooperative with attempting stairs  - pt able to perform stairs with left rail and cane and CGA - daughter present and instructed in proper guarding of patient. Pt cleared for discharge. Current Level of Function Impacting Discharge (mobility/balance): standby to occ min assist for supine to/from sit; CGA to min assist for sit to stand (lower surfaces); amb with RW with supervision, CGA for stairs using rail/cane    Other factors to consider for discharge: Daughter present and family will be assisting pt at discharge         PLAN :  Patient continues to benefit from skilled intervention to address the above impairments. Continue treatment per established plan of care. to address goals. Recommendation for discharge: (in order for the patient to meet his/her long term goals)  Physical therapy at least 2 days/week in the home     This discharge recommendation:  Has been made in collaboration with the attending provider and/or case management    IF patient discharges home will need the following DME: patient owns DME required for discharge       SUBJECTIVE:   Patient stated I just need to stay on top of pain - I think I will do ok at home  - I will have help.     OBJECTIVE DATA SUMMARY:   Critical Behavior:  Neurologic State: Alert, Appropriate for age  Orientation Level: Oriented X4  Cognition: Follows commands  Safety/Judgement: Awareness of environment, Good awareness of safety precautions  Functional Mobility Training:  Bed Mobility:     Supine to Sit: Stand-by assistance  Sit to Supine: Minimum assistance; Adaptive equipment; Additional time (using gait belt to assist leg into bed)  Scooting: Stand-by assistance        Transfers:  Sit to Stand: Stand-by assistance (min assist from low surfaces)  Stand to Sit: Stand-by assistance (verbal cues to use arms to slow descent)                             Balance:  Sitting: Intact; Without support  Standing: Impaired; Without support  Standing - Static: Good;Constant support  Standing - Dynamic : Fair;Constant support (limited dynamic d/t requires bilateral support of RW at all times)  Ambulation/Gait Training:  Distance (ft): 70 Feet (ft)  Assistive Device: Walker, rolling;Gait belt  Ambulation - Level of Assistance: Supervision        Gait Abnormalities: Decreased step clearance  Right Side Weight Bearing: Full  Left Side Weight Bearing: As tolerated  Base of Support: Center of gravity altered;Shift to right  Stance: Left decreased  Speed/Amairani: Slow  Step Length: Right shortened;Left shortened  Swing Pattern: Left asymmetrical        Therapeutic Exercises:   Reviewed importance of continuing supine exercise and to begin positioning left leg in flexion vs. Knee extension all the time in supine and sitting. Therapist demonstrated and instructed pt in seated knee flexion. Pain Rating:  Left knee 8/10    Activity Tolerance:   Fair - limited at times secondary to left knee pain - pt understands to keep log of pain meds and to take as needed to prevent \"getting behind pain\". Pt cleared for discharge. After treatment patient left in no apparent distress:   Supine in bed, Call bell within reach, Caregiver / family present, Side rails x 3, and Ice applied to left knee    COMMUNICATION/COLLABORATION:   The patients plan of care was discussed with: Registered nurse and NP - Miranda Eller and PA - Marda Cowden .      Yuri Briceno, PT   Time Calculation: 45 mins

## 2023-01-06 NOTE — PROGRESS NOTES
Medicare Outpatient Observation Notice (MOON)) provided to patient/representative with verbal explanation of the notice. Time allotted for questions regarding the notice. Patient /representative provided a completed copy of the MOON notice. Copy placed on bedside chart.

## 2023-01-06 NOTE — PROGRESS NOTES
Problem: Falls - Risk of  Goal: *Absence of Falls  Description: Document Terri Mooney Fall Risk and appropriate interventions in the flowsheet.   1/6/2023 1245 by Yehuda Teran RN  Outcome: Progressing Towards Goal  Note: Fall Risk Interventions:  Mobility Interventions: Communicate number of staff needed for ambulation/transfer         Medication Interventions: Patient to call before getting OOB    Elimination Interventions: Call light in reach           1/6/2023 1244 by Yehuda Teran RN  Outcome: Progressing Towards Goal  Note: Fall Risk Interventions:                             1/6/2023 1242 by Yehuda Teran RN  Outcome: Progressing Towards Goal  Note: Fall Risk Interventions:                                Problem: Patient Education: Go to Patient Education Activity  Goal: Patient/Family Education  1/6/2023 7697 by Yehuda Teran RN  Outcome: Progressing Towards Goal  Note: Pain management  Fall prevention    1/6/2023 1242 by Yehuda Teran RN  Outcome: Progressing Towards Goal  Note: Pain management  Fall prevention

## 2023-01-06 NOTE — PROGRESS NOTES
Bedside and Verbal shift change report given to Elzbieta Lazar (oncoming nurse) by Javed So (offgoing nurse). Report included the following information SBAR and MAR.

## 2023-01-06 NOTE — PROGRESS NOTES
Ortho NP Note    POD# 1  s/p LEFT TOTAL KNEE ARTHROPLASTY (W/BLOCK W/IV SEDATION)   Pt seen with daughter at bedside    Pt seated in chair. Patient reports pain is tolerable, 3/10 to knee. Aware of pending therapy session this morning and agreeable. Nausea following surgery has been minimal, requesting scop patch to be removed as she feels it is causing ear pain. Ate breakfast, drinking water. No CP, no SOB. No dizziness, lightheadedness. VSS Afebrile. Visit Vitals  BP (!) 137/93 (BP 1 Location: Right upper arm, BP Patient Position: At rest;Sitting)   Pulse 63   Temp 98.4 °F (36.9 °C)   Resp 18   Ht 5' 5\" (1.651 m)   Wt 95.7 kg (210 lb 15.7 oz)   SpO2 96%   BMI 35.11 kg/m²       Voiding status: spontaneous void  Output (mL)  Urine Voided: 125 ml (01/06/23 0908)  Last Bowel Movement Date: 01/05/23 (01/05/23 2147)  Straight Cath  Straight Cath: Nurse performed cath (01/05/23 1212)  Number of Attempts: 1 (01/05/23 1212)  Catheter Size: 16 FR (01/05/23 1212)  Time Catheter Inserted: 6780 (01/05/23 1212)  Time Catheter Removed: 0877 (01/05/23 1212)  Urine: 400 mL (01/05/23 1212)      Labs    Lab Results   Component Value Date/Time    HGB 9.7 (L) 01/06/2023 02:21 AM      Lab Results   Component Value Date/Time    INR 1.0 12/19/2022 11:23 AM      Lab Results   Component Value Date/Time    Sodium 142 01/06/2023 02:21 AM    Potassium 4.0 01/06/2023 02:21 AM    Chloride 113 (H) 01/06/2023 02:21 AM    CO2 23 01/06/2023 02:21 AM    Glucose 98 01/06/2023 02:21 AM    BUN 15 01/06/2023 02:21 AM    Creatinine 0.61 01/06/2023 02:21 AM    Calcium 8.5 01/06/2023 02:21 AM     Recent Glucose Results:   Lab Results   Component Value Date/Time    GLU 98 01/06/2023 02:21 AM    GLUCPOC 91 01/06/2023 06:21 AM    GLUCPOC 78 01/05/2023 09:29 PM    GLUCPOC 108 01/05/2023 12:54 PM           Body mass index is 35.11 kg/m². : A BMI > 30 is classified as obesity and > 40 is classified as morbid obesity.        Aquacel dressing to left knee c.d.i  Cryotherapy in place over incision  Calves soft and supple; No pain with passive stretch  Bilateral LEs warm, dry. 2+ DP pulses. Sensation and motor intact - PF/DF/EHL intact 5/5  Foot Pumps for mechanical DVT proph while in bed     PLAN:  1) PT: BID WBAT  2) Anticoagulation:  Aspirin 81 mg PO BID for DVT Prophylaxis. Encouraged early mobilization, bed exercises, and SCD use. 3) Pain - Multimodal approach including cryotherapy, scheduled Tylenol & Toradol with  PRN oxycodone & IV dilaudid. Increased oxycodone to 5-10 mg q 3 hrs and encouraged use as needed. 4) T2DM: Accuchecks, SS lispro. Carb controlled diet. Resume home regimen at discharge. 5) Post operative anemia: Hgb at PAT: 13.2. on 12/19. EBL: 200 mL. Hgb on POD 1: 9.7. No active bleeding on exam. Expected Acute blood loss post-op anemia, continue to monitor. 6) PONV, improved: Scop patch removed per patient preference; PRN zofran ODT sent for discharge  7) Post op care: Continue bowel regimen, encouraged IS. Follow up in 3-4 weeks with Dr Puja Lambert. Aquacel to remain in place x 7 days unless integrity is lost.   8) Readiness for discharge:     [x] Vital Signs stable    [x] + Voiding    [x] Wound intact, drainage minimal    [x] Tolerating PO intake     [] Cleared by PT (OT if applicable)     [] Stair training completed (if applicable)    [] Independent / Contact Guard Assist (household distance)     [] Bed mobility     [] Car transfers     [] ADLs    [x] Adequate pain control on oral medication alone     Discharge to home with home health and family support pending therapy clearance.       Brendon Platt NP  Available via [employer]

## 2023-01-06 NOTE — PROGRESS NOTES
Problem: Falls - Risk of  Goal: *Absence of Falls  Description: Document Leafy Quintero Fall Risk and appropriate interventions in the flowsheet. Outcome: Progressing Towards Goal  Note: Fall Risk Interventions:     Call light in reach, side rails, up, gripper socks on. Problem: Knee Replacement: Day of Surgery/Unit  Goal: Nutrition/Diet  Outcome: Progressing Towards Goal  Goal: Medications  Outcome: Progressing Towards Goal  Goal: Respiratory  Outcome: Progressing Towards Goal  Note: Patient advised on incentive spirometry usage.   Goal: *Initiate mobility  Outcome: Progressing Towards Goal  Goal: *Optimal pain control at patient's stated goal  Outcome: Progressing Towards Goal

## 2023-01-06 NOTE — PROGRESS NOTES
Problem: Falls - Risk of  Goal: *Absence of Falls  Description: Document Keo Montez Fall Risk and appropriate interventions in the flowsheet.   Outcome: Progressing Towards Goal  Note: Fall Risk Interventions:                                Problem: Knee Replacement: Day of Surgery/Unit  Goal: Activity/Safety  Outcome: Progressing Towards Goal  Goal: Diagnostic Test/Procedures  Outcome: Progressing Towards Goal  Goal: Nutrition/Diet  Outcome: Progressing Towards Goal  Goal: Medications  Outcome: Progressing Towards Goal  Goal: Respiratory  Outcome: Progressing Towards Goal  Goal: Treatments/Interventions/Procedures  Outcome: Progressing Towards Goal  Goal: Psychosocial  Outcome: Progressing Towards Goal  Goal: *Initiate mobility  Outcome: Progressing Towards Goal  Goal: *Optimal pain control at patient's stated goal  Outcome: Progressing Towards Goal  Goal: *Hemodynamically stable  Outcome: Progressing Towards Goal

## 2023-01-06 NOTE — PROGRESS NOTES
Bedside and Verbal shift change report given to CAT Schafer (oncoming nurse) by Sinan Duong RN (offgoing nurse). Report included the following information SBAR, Kardex, Procedure Summary, Intake/Output, MAR, Accordion, and Recent Results.

## 2023-01-06 NOTE — OP NOTES
[address]   OPERATIVE REPORT    Name:  Bob Kendrick  MR#:  [id number]  :  1945  ACCOUNT #:  [de-identified]  DATE OF SERVICE:  2023      PREOPERATIVE DIAGNOSIS:  Osteoarthritis of left knee. POSTOPERATIVE DIAGNOSIS:  Osteoarthritis of left knee. PROCEDURE PERFORMED:  Left total knee arthroplasty. SURGEON:  Belkis Qiu MD    FIRST ASSISTANT:  Nat France PA-C    ANESTHESIA:  Spinal with sedation as well as adductor canal block. COMPLICATIONS:  None. SPECIMENS REMOVED:  None. IMPLANTS:  Components implanted:  [employer] Empowr 3D size 8 femur, size 8- tibial tray with 13-mm polyethylene insert, and 32-mm patella. ESTIMATED BLOOD LOSS:  200 mL. INDICATIONS:  The patient is a 80-year-old female with progressive left knee pain due to severe osteoarthritis. Symptoms have progressed despite comprehensive conservative treatment and she presents for left total knee replacement. Risks, benefits, alternatives of procedure were reviewed with her in detail and she desires to proceed. PROCEDURE:  Anesthesia team placed an adductor canal block in the left thigh before taking the patient to the operating room and they also placed a spinal.  Preoperative IV antibiotics were administered. Padded pneumatic tourniquet was placed around the left upper thigh. The left lower extremity was prepped and draped in the usual sterile fashion. Tourniquet was inflated to 275. Through a midline anterior knee incision, I performed a medial parapatellar arthrotomy. Progressive medial release was performed to facilitate exposure and soft tissue balance throughout the procedure. As soon as the knee was flexed, the tourniquet was released. 10-mm distal femoral resection was performed using OrthAlign to navigate a neutral cut relative to mechanical axis of the femur. PCL was excised. Proximal tibial resection was performed using an extramedullary alignment guide.   Menisci were excised. Progressive medial release was performed until there was a symmetrical extension gap with a 12-mm spacer block. Knee was placed in 90 degrees of flexion and gap  was inserted. Soft tissue tension was applied and block was adjusted to produce a 12-mm flexion space. Femoral rotation was drilled and the femur was sized and prepared for size 8 component utilizing appropriate jig. Remaining posterior osteophytes were removed. Subperiosteal posterior capsular release was performed up to back of the femur. Trials were inserted and with the 12-mm insert in place, the knee had full extension to gravity. There was symmetrical collateral soft tissue laxity medially and laterally throughout arc of motion. With a 14-mm trial insert in place, there was symmetrical soft tissue tightness medially and laterally throughout arc of motion. I elected to implant a 13-mm insert. Patella was prepared for 32-mm component and tracked centrally using no thumbs technique. Tourniquet was re-inflated. Trials were removed and bony surfaces were copiously irrigated by pulse lavage and dried before the real components were cemented in place using antibiotic-impregnated cement. Excess cement was removed. The knee was reduced in full extension with the real insert in place during cement setup. Periarticular soft tissues were injected with solution containing 0.5% ropivacaine with epinephrine as well as clonidine and Toradol. Tourniquet was released. Wound was irrigated. Arthrotomy was closed with a combination of heavy Vicryl sutures and a running #2 Stratafix suture. Skin and subcutaneous layers were closed in layered fashion with Vicryl and a running Monocryl subcuticular stitch. Wound was dressed with Dermabond and Aquacel occlusive dressing as well as sterile compressive dressing.   The patient's bladder was decompressed with straight catheterization before she was transported to postanesthesia care unit in stable condition. All counts were correct at the end of the procedure. The physician assistant was critical throughout the procedure to assist with patient positioning, retraction, and closure.   There were no [employer] residents or fellows available to MD MINNIE [email or web address]  D:  01/05/2023 19:02  T:  01/06/2023 3:51  JOB #:  [id number]  CC:  Misti Souza MD

## 2023-01-06 NOTE — PROGRESS NOTES
Primary Nurse Harvey Rivera RN and Mp Rodriguez RN performed a dual skin assessment on this patient No impairment noted  Gio score is 21

## 2023-01-07 ENCOUNTER — HOSPITAL ENCOUNTER (EMERGENCY)
Age: 78
Discharge: HOME | End: 2023-01-07
Attending: EMERGENCY MEDICINE
Payer: MEDICARE

## 2023-01-07 ENCOUNTER — APPOINTMENT (OUTPATIENT)
Dept: VASCULAR SURGERY | Age: 78
End: 2023-01-07
Attending: EMERGENCY MEDICINE
Payer: MEDICARE

## 2023-01-07 VITALS
DIASTOLIC BLOOD PRESSURE: 84 MMHG | SYSTOLIC BLOOD PRESSURE: 153 MMHG | OXYGEN SATURATION: 98 % | TEMPERATURE: 97.6 F | RESPIRATION RATE: 18 BRPM | HEART RATE: 75 BPM

## 2023-01-07 DIAGNOSIS — M79.89 LEFT LEG SWELLING: ICD-10-CM

## 2023-01-07 DIAGNOSIS — G89.18 POST-OPERATIVE PAIN: Primary | ICD-10-CM

## 2023-01-07 LAB
ANION GAP SERPL CALC-SCNC: 9 MMOL/L (ref 5–15)
BASOPHILS # BLD: 0 K/UL (ref 0–0.1)
BASOPHILS NFR BLD: 0 % (ref 0–1)
BUN SERPL-MCNC: 10 MG/DL (ref 6–20)
BUN/CREAT SERPL: 19 (ref 12–20)
CALCIUM SERPL-MCNC: 9.7 MG/DL (ref 8.5–10.1)
CHLORIDE SERPL-SCNC: 109 MMOL/L (ref 97–108)
CO2 SERPL-SCNC: 22 MMOL/L (ref 21–32)
COMMENT, HOLDF: NORMAL
CREAT SERPL-MCNC: 0.54 MG/DL (ref 0.55–1.02)
DIFFERENTIAL METHOD BLD: ABNORMAL
EOSINOPHIL # BLD: 0.1 K/UL (ref 0–0.4)
EOSINOPHIL NFR BLD: 1 % (ref 0–7)
ERYTHROCYTE [DISTWIDTH] IN BLOOD BY AUTOMATED COUNT: 13.5 % (ref 11.5–14.5)
GLUCOSE SERPL-MCNC: 138 MG/DL (ref 65–100)
HCT VFR BLD AUTO: 30.3 % (ref 35–47)
HGB BLD-MCNC: 9.7 G/DL (ref 11.5–16)
IMM GRANULOCYTES # BLD AUTO: 0 K/UL (ref 0–0.04)
IMM GRANULOCYTES NFR BLD AUTO: 0 % (ref 0–0.5)
LYMPHOCYTES # BLD: 1.2 K/UL (ref 0.8–3.5)
LYMPHOCYTES NFR BLD: 14 % (ref 12–49)
MCH RBC QN AUTO: 30.2 PG (ref 26–34)
MCHC RBC AUTO-ENTMCNC: 32 G/DL (ref 30–36.5)
MCV RBC AUTO: 94.4 FL (ref 80–99)
MONOCYTES # BLD: 0.7 K/UL (ref 0–1)
MONOCYTES NFR BLD: 8 % (ref 5–13)
NEUTS SEG # BLD: 6.9 K/UL (ref 1.8–8)
NEUTS SEG NFR BLD: 77 % (ref 32–75)
NRBC # BLD: 0 K/UL (ref 0–0.01)
NRBC BLD-RTO: 0 PER 100 WBC
PLATELET # BLD AUTO: 188 K/UL (ref 150–400)
PMV BLD AUTO: 11.9 FL (ref 8.9–12.9)
POTASSIUM SERPL-SCNC: 3.7 MMOL/L (ref 3.5–5.1)
RBC # BLD AUTO: 3.21 M/UL (ref 3.8–5.2)
RBC MORPH BLD: ABNORMAL
SAMPLES BEING HELD,HOLD: NORMAL
SODIUM SERPL-SCNC: 140 MMOL/L (ref 136–145)
WBC # BLD AUTO: 8.9 K/UL (ref 3.6–11)

## 2023-01-07 PROCEDURE — 85025 COMPLETE CBC W/AUTO DIFF WBC: CPT

## 2023-01-07 PROCEDURE — 36415 COLL VENOUS BLD VENIPUNCTURE: CPT

## 2023-01-07 PROCEDURE — 93971 EXTREMITY STUDY: CPT

## 2023-01-07 PROCEDURE — 80048 BASIC METABOLIC PNL TOTAL CA: CPT

## 2023-01-07 PROCEDURE — 99283 EMERGENCY DEPT VISIT LOW MDM: CPT

## 2023-01-07 RX ORDER — MORPHINE SULFATE 4 MG/ML
4 INJECTION INTRAVENOUS
Status: DISCONTINUED | OUTPATIENT
Start: 2023-01-07 | End: 2023-01-07 | Stop reason: HOSPADM

## 2023-01-07 RX ORDER — ONDANSETRON 2 MG/ML
4 INJECTION INTRAMUSCULAR; INTRAVENOUS ONCE
Status: DISCONTINUED | OUTPATIENT
Start: 2023-01-07 | End: 2023-01-07 | Stop reason: HOSPADM

## 2023-01-07 NOTE — ED TRIAGE NOTES
Pt arrives to triage w/ complaint of LLE pain. Pt states she had a L knee replacement on 1/5. Pt home health nurse noticed increased swelling/ pain/ warmth on the LLE and sent to ED.

## 2023-01-07 NOTE — ED PROVIDER NOTES
72-year-old female presents from home accompanied by her daughter with complaint of pain and swelling to her left lower extremity. Patient had total knee replacement on January 5 by Dr. Ariana Crocker. She was discharged home yesterday, January 6. Home health came to visit her today and were concerned with the swelling in the left lower extremity. Patient reports pain has been unrelieved with her home pain medication. No fever at home. She denies any history of blood clots. Does not currently take any blood thinning medications. Past medical history significant for hypertension, high cholesterol, nausea vomiting.        Past Medical History:   Diagnosis Date    Arrhythmia     HEART PALPITATIONS OCCASIONALLY    Arthritis     Chronic pain 2016    LEFT KNEE    Colon polyps     hyperplastic    Hypercholesterolemia     Hypertension     Nausea & vomiting     Psychiatric disorder     ANXIETY AND DEPRESSION       Past Surgical History:   Procedure Laterality Date    ENDOSCOPY, COLON, DIAGNOSTIC  08/2009    HX CAROTID ENDARTERECTOMY Right 07/01/2019    Dr. Jackson Spotted    HX HEENT      T&A (childhood)    HX HERNIA REPAIR      umbilical    HX ORTHOPAEDIC Left 2016    MENISCUS REPAIR    HX ORTHOPAEDIC Right     JOINT SURGERY ON MIDDLE FINGER TO REMOVE A GROWTH-PT DENIES    HX TUBAL LIGATION  1990         Family History:   Problem Relation Age of Onset    Macular Degen Mother     OSTEOARTHRITIS Mother     Osteoporosis Mother     Other Mother         tremor    Cancer Mother         Esophageal    Parkinsonism Father     Stroke Father         CVA (42's)    Bipolar Disorder Sister     Alzheimer's Disease Sister     No Known Problems Sister     No Known Problems Daughter     Attention Deficit Hyperactivity Disorder Daughter     No Known Problems Daughter     Anesth Problems Neg Hx        Social History     Socioeconomic History    Marital status:      Spouse name: Not on file    Number of children: Not on file    Years of education: Not on file    Highest education level: Not on file   Occupational History    Not on file   Tobacco Use    Smoking status: Never    Smokeless tobacco: Never   Vaping Use    Vaping Use: Never used   Substance and Sexual Activity    Alcohol use: Yes     Alcohol/week: 1.0 standard drink     Types: 1 Shots of liquor per week     Comment: socially    Drug use: No    Sexual activity: Not Currently     Partners: Male   Other Topics Concern     Service Not Asked    Blood Transfusions Not Asked    Caffeine Concern Not Asked    Occupational Exposure Not Asked    [address] Hazards Not Asked    Sleep Concern Yes    Stress Concern Yes    Weight Concern Yes    Special Diet No    Back Care Not Asked    Exercise Not Asked    Bike Helmet Not Asked    Seat Belt Not Asked    Self-Exams Not Asked   Social History Narrative    Not on file     Social Determinants of Health     Financial Resource Strain: Not on file   Food Insecurity: Not on file   Transportation Needs: Not on file   Physical Activity: Not on file   Stress: Not on file   Social Connections: Not on file   Intimate Partner Violence: Not on file   Housing Stability: Not on file         ALLERGIES: Ace inhibitors and Thiazides    Review of Systems   Constitutional:  Negative for fever. HENT:  Negative for facial swelling. Eyes:  Negative for visual disturbance. Respiratory:  Negative for chest tightness. Cardiovascular:  Negative for chest pain. Gastrointestinal:  Negative for abdominal pain. Genitourinary:  Negative for difficulty urinating and dysuria. Musculoskeletal:  Negative for arthralgias. Skin:  Negative for rash. Neurological:  Negative for headaches. Hematological:  Negative for adenopathy. Psychiatric/Behavioral:  Negative for suicidal ideas. Vitals:    01/07/23 1141   BP: (!) 153/84   Pulse: 75   Resp: 18   Temp: 97.6 °F (36.4 °C)   SpO2: 98%            Physical Exam  Vitals and nursing note reviewed.    Constitutional: General: She is not in acute distress. Appearance: She is well-developed. HENT:      Head: Normocephalic and atraumatic. Eyes:      General: No scleral icterus. Conjunctiva/sclera: Conjunctivae normal.      Pupils: Pupils are equal, round, and reactive to light. Cardiovascular:      Rate and Rhythm: Normal rate. Heart sounds: No murmur heard. Pulmonary:      Effort: Pulmonary effort is normal. No respiratory distress. Abdominal:      General: There is no distension. Musculoskeletal:      Cervical back: Normal range of motion and neck supple. Comments: Left knee with intact, dry bandage over the left knee. Left lower extremity with generalized swelling and warmth. No obvious erythema. Neurovascular intact. Skin:     General: Skin is warm and dry. Findings: No rash. Neurological:      Mental Status: She is alert and oriented to person, place, and time. Medical Decision Making  Assessment: Pain and swelling to the left lower extremity 2 days post knee replacement surgery. No fever. Symptoms could be normal postoperative pain and swelling. We will obtain a lower extremity ultrasound to rule out DVT and also check some basic blood work to evaluate for possible cellulitis. Patient requesting pain and nausea medication which we will treat with morphine and Zofran. Reassess after results of diagnostics. 1:40 PM  Patient's white count was normal.  No fever. Lower extremity DVT study was negative. I suspect the patient's pain is most often likely postoperative pain and swelling. I do think this represents early cellulitis or blood clot. I think she stable for discharge home to continue with pain medication and elevation. I recommend a follow-up with Dr. Bertha Perez office on Monday. Amount and/or Complexity of Data Reviewed  Labs: ordered. ECG/medicine tests: ordered. Risk  Prescription drug management.            Procedures

## 2023-01-09 ENCOUNTER — TELEPHONE (OUTPATIENT)
Dept: INTERNAL MEDICINE CLINIC | Age: 78
End: 2023-01-09

## 2023-01-09 NOTE — TELEPHONE ENCOUNTER
----- Message from Sarah Jinny sent at 1/6/2023 12:05 PM EST -----  Subject: Message to Provider    QUESTIONS  Information for Provider? [facility] would like to know   the last time the patient was seen by the provider. ---------------------------------------------------------------------------  --------------  Jazmin GUERRERO  [phone]; OK to leave message on voicemail  ---------------------------------------------------------------------------  --------------  SCRIPT ANSWERS  Relationship to Patient? Third Party  Third Party Type? Home Health Care? Representative Name?  Vincent Jc

## 2023-01-09 NOTE — PROGRESS NOTES
[facility]  [facility]     FROM:                                TO: At [address]                                                      ([address] [place] [address] or Facility name)  [place] [address]  Dept: [address]: [phone]                                      Room#:  024/86                                                       Nurse Navigator: Rianna Anthony RN         SITUATION      ASAScore: ASA 3 - Patient with moderate systemic disease with functional limitations    Admitted:  1/5/2023  Hospital Day: 2      Attending Provider:  No att. providers found     Consultations:  None    PCP:  Laura Guerrero MD   [phone]     Admitting Dx:  Primary osteoarthritis of left knee [M17.12]  Degenerative arthritis of left knee [M17.12]       Active Problems:    Degenerative arthritis of left knee (1/5/2023)      4 Days Post-Op of   Procedure(s):  LEFT TOTAL KNEE ARTHROPLASTY (W/BLOCK W/IV SEDATION)   BY: Maya Erazo MD             ON: 1/5/2023                  Code Status: Prior             Advance Directive? Yes Not W Pt (Send w/patient)     Isolation:  There are currently no Active Isolations       MDRO: No current active infections    BACKGROUND     Allergies:   Allergies   Allergen Reactions    Ace Inhibitors Cough    Thiazides Photosensitivity       Past Medical History:   Diagnosis Date    Arrhythmia     HEART PALPITATIONS OCCASIONALLY    Arthritis     Chronic pain 2016    LEFT KNEE    Colon polyps     hyperplastic    Hypercholesterolemia     Hypertension     Nausea & vomiting     Psychiatric disorder     ANXIETY AND DEPRESSION       Past Surgical History:   Procedure Laterality Date    ENDOSCOPY, COLON, DIAGNOSTIC  08/2009    HX CAROTID ENDARTERECTOMY Right 07/01/2019    Dr. Quispe Pi    HX HEENT      T&A (childhood)    HX HERNIA REPAIR      umbilical    HX ORTHOPAEDIC Left 2016    MENISCUS REPAIR HX ORTHOPAEDIC Right     JOINT SURGERY ON MIDDLE FINGER TO REMOVE A GROWTH-PT DENIES    HX TUBAL LIGATION  1990       Prior to Admission Medications   Prescriptions Last Dose Informant Patient Reported? Taking? VIT C/E/ZN/COPPR/LUTEIN/ZEAXAN (PRESERVISION AREDS 2 PO) Unknown  Yes No   Sig: Take 1 Tablet by mouth two (2) times a day. acetaminophen/diphenhydramine (TYLENOL PM PO) Unknown  Yes No   Sig: Take  by mouth nightly as needed. amLODIPine (NORVASC) 5 mg tablet Unknown  No No   Sig: TAKE 1 TABLET BY MOUTH EVERY EVENING   aspirin 81 mg cap Unknown  Yes No   Sig: Take 1 Tablet by mouth daily. cholecalciferol (VITAMIN D3) (2,000 UNITS /50 MCG) cap capsule Unknown  Yes No   Sig: Take 2,000 Units by mouth every evening. losartan (COZAAR) 100 mg tablet Unknown  No No   Sig: TAKE 1 TABLET BY MOUTH EVERY EVENING   metFORMIN ER (GLUCOPHAGE XR) 500 mg tablet Unknown  No No   Sig: Take 1 Tablet by mouth two (2) times daily (with meals). Patient taking differently: Take 500 mg by mouth two (2) times a day. multivitamin, tx-iron-ca-min (THERA-M w/ IRON) 9 mg iron-400 mcg tab tablet Unknown  Yes No   Sig: Take 1 Tablet by mouth daily. rosuvastatin (CRESTOR) 20 mg tablet Unknown  No No   Sig: TAKE 1 TABLET BY MOUTH EVERY NIGHT      Facility-Administered Medications: None       Vaccinations:    Immunization History   Administered Date(s) Administered     Influenza, FLUZONE (age 72 y+), High Dose 11/02/2022    COVID-19, MODERNA BLUE border, Primary or Immunocompromised, (age 18y+), IM, 100 mcg/0.5mL 01/29/2021, 02/26/2021, 10/27/2021, 11/02/2022    Influenza High Dose Vaccine PF 10/28/2015, 09/28/2017, 08/31/2018, 09/27/2018, 09/06/2019    Influenza Vaccine 09/01/2013, 12/30/2014    Pneumococcal Conjugate (PCV-13) 06/07/2016    Pneumococcal Polysaccharide (PPSV-23) 10/10/2013    Tdap 05/21/2018    Zoster Recombinant 08/01/2018, 09/27/2018         ASSESSMENT   Age: 78 y.o.              Gender: female Height: Height: 5' 5\" (165.1 cm)                    Weight:Weight: 95.7 kg (210 lb 15.7 oz)     No data found. Active Orders   There are no active orders of the following types: Diet. Orientation: Orientation Level: Oriented X4    Active Lines/Drains:  (Peg Tube / Moss / CL or S/L?):no    Urinary Status: Voiding      Last BM: Last Bowel Movement Date: 01/05/23     Skin Integrity: Incision (comment)             Mobility: Slightly limited   Weight Bearing Status: WBAT (Weight Bearing as Tolerated)      Gait Training  Assistive Device: Walker, rolling, Gait belt  Ambulation - Level of Assistance: Supervision  Distance (ft): 70 Feet (ft)     On Anticoagulation? YES  Aspirin                                         Pain Medications given:  Oxycodone                                   Lab Results   Component Value Date/Time    Glucose 138 (H) 01/07/2023 11:52 AM    Hemoglobin A1c 5.4 12/19/2022 11:23 AM    INR 1.0 12/19/2022 11:23 AM    INR 1.0 06/27/2019 03:57 PM    HGB 9.7 (L) 01/07/2023 11:52 AM    HGB 9.7 (L) 01/06/2023 02:21 AM    HGB 13.2 12/19/2022 11:23 AM    HGB 14.4 06/23/2021 07:16 AM       Readmission Risks:  Score:         RECOMMENDATION     See After Visit Summary (AVS) for:  Discharge instructions  After [facility]   Medication Reconciliation          [facility] Orthopaedic Nurse Navigator  RICARDO Pittman, RN      Office  [phone]  Fax      [phone]  [email or web address]             . [place]

## 2023-01-12 DIAGNOSIS — M17.12 PRIMARY OSTEOARTHRITIS OF LEFT KNEE: ICD-10-CM

## 2023-01-12 RX ORDER — OXYCODONE HYDROCHLORIDE 5 MG/1
2.5-5 TABLET ORAL
Qty: 42 TABLET | Refills: 0 | Status: SHIPPED | OUTPATIENT
Start: 2023-01-12 | End: 2023-01-13 | Stop reason: SDUPTHER

## 2023-01-13 DIAGNOSIS — M17.12 PRIMARY OSTEOARTHRITIS OF LEFT KNEE: ICD-10-CM

## 2023-01-13 DIAGNOSIS — Z96.652 STATUS POST TOTAL KNEE REPLACEMENT, LEFT: Primary | ICD-10-CM

## 2023-01-13 RX ORDER — OXYCODONE HYDROCHLORIDE 5 MG/1
2.5-5 TABLET ORAL
Qty: 42 TABLET | Refills: 0 | Status: SHIPPED | OUTPATIENT
Start: 2023-01-13 | End: 2023-01-20

## 2023-01-26 ENCOUNTER — OFFICE VISIT (OUTPATIENT)
Dept: ORTHOPEDIC SURGERY | Age: 78
End: 2023-01-26
Payer: MEDICARE

## 2023-01-26 VITALS — BODY MASS INDEX: 35.11 KG/M2 | HEIGHT: 65 IN

## 2023-01-26 DIAGNOSIS — Z09 SURGERY FOLLOW-UP: ICD-10-CM

## 2023-01-26 DIAGNOSIS — Z96.652 STATUS POST TOTAL KNEE REPLACEMENT, LEFT: Primary | ICD-10-CM

## 2023-01-26 PROCEDURE — 99024 POSTOP FOLLOW-UP VISIT: CPT | Performed by: PHYSICIAN ASSISTANT

## 2023-01-26 NOTE — LETTER
1/26/2023    Patient: Zoila Heller   YOB: 1945   Date of Visit: 1/26/2023     Alondra Vieyra, [address]    Dear Alondra Vieyra MD,      Thank you for referring Ms. Dev Trimble to [facility] for evaluation. My notes for this consultation are attached. If you have questions, please do not hesitate to call me. I look forward to following your patient along with you.       Sincerely,    Jocelyne Angulo PA-C

## 2023-01-26 NOTE — PROGRESS NOTES
Jessica Angel (: 1945) is a 78 y.o. female, patient, here for evaluation of the following chief complaint(s):  Surgical Follow-up (PO #1: 23/)       SUBJECTIVE/OBJECTIVE:  Jessica Angel presents today for first postop visit status post left total knee arthroplasty on 23. She is doing well, finished with home therapy. Taking Tylenol as needed. Still taking aspirin twice a day as directed for DVT prophylaxis. PHYSICAL EXAM:  Vitals: Ht 5' 5\" (1.651 m)   BMI 35.11 kg/m²   Body mass index is 35.11 kg/m². 78y.o. year old F in no acute distress. Ambulates with a slight limp on the left, cane in the contralateral hand. Neutral alignment of the left knee. Well approximated, well-healing anterior incision. Eschar remains proximally and distally. No marginal erythema, warmth, drainage. Range of motion lacks a few degrees of full extension with flexion to about 85/90, preop 5-1 20. Motor 5/5. No distal edema. IMAGING:  Radiographs: XR Results (most recent):  Results from Appointment encounter on 23    XR KNEE LT 3 V    Narrative  3 views of the left knee today including AP, lateral and sunrise using digital radiography demonstrate satisfactory position and alignment of cemented cruciate substituting components. No evidence of loosening. Patella tracks centrally. ASSESSMENT/PLAN:  1. Status post total knee replacement, left  -     XR KNEE LT 3 V; Future  -     REFERRAL TO PHYSICAL THERAPY  2. Surgery follow-up    First postop visit status post left total knee arthroplasty in 2023. Transition outpatient physical therapy. We discussed at home exercises to work on for terminal extension and increased flexion, discussed range of motion goals. Resume daily aspirin dosing after this weekend. Continue Tylenol as needed, may add over-the-counter anti-inflammatories as well. Follow-up in 4 weeks for clinical recheck, sooner if needed.    Return in about 4 weeks (around 2/23/2023) for POV #2 with Dr. Juan F Esteban. Review Of Systems  ROS    Positive for: Skin, Musculoskeletal  Last edited by Tram Willis on 1/26/2023  3:01 PM.         Patient denies any recent fever, chills, nausea, vomiting, chest pain, or shortness of breath. Allergies   Allergen Reactions    Ace Inhibitors Cough    Thiazides Photosensitivity       Current Outpatient Medications   Medication Sig    ondansetron (ZOFRAN ODT) 4 mg disintegrating tablet Take 1 Tablet by mouth every eight (8) hours as needed for Nausea or Vomiting. aspirin delayed-release 81 mg tablet Take 1 Tablet by mouth two (2) times a day. Indications: blood clot prevention    senna-docusate (PERICOLACE) 8.6-50 mg per tablet Take 1 Tablet by mouth two (2) times daily as needed for Constipation. acetaminophen (TYLENOL) 500 mg tablet Take 1 Tablet by mouth every four (4) hours. losartan (COZAAR) 100 mg tablet TAKE 1 TABLET BY MOUTH EVERY EVENING    amLODIPine (NORVASC) 5 mg tablet TAKE 1 TABLET BY MOUTH EVERY EVENING    multivitamin, tx-iron-ca-min (THERA-M w/ IRON) 9 mg iron-400 mcg tab tablet Take 1 Tablet by mouth daily. aspirin 81 mg cap Take 1 Tablet by mouth daily. rosuvastatin (CRESTOR) 20 mg tablet TAKE 1 TABLET BY MOUTH EVERY NIGHT    metFORMIN ER (GLUCOPHAGE XR) 500 mg tablet Take 1 Tablet by mouth two (2) times daily (with meals). (Patient taking differently: Take 500 mg by mouth two (2) times a day.)    cholecalciferol (VITAMIN D3) (2,000 UNITS /50 MCG) cap capsule Take 2,000 Units by mouth every evening. VIT C/E/ZN/COPPR/LUTEIN/ZEAXAN (PRESERVISION AREDS 2 PO) Take 1 Tablet by mouth two (2) times a day. No current facility-administered medications for this visit.        Past Medical History:   Diagnosis Date    Arrhythmia     HEART PALPITATIONS OCCASIONALLY    Arthritis     Chronic pain 2016    LEFT KNEE    Colon polyps     hyperplastic    Hypercholesterolemia     Hypertension     Nausea & vomiting Psychiatric disorder     ANXIETY AND DEPRESSION        Past Surgical History:   Procedure Laterality Date    ENDOSCOPY, COLON, DIAGNOSTIC  08/2009    HX CAROTID ENDARTERECTOMY Right 07/01/2019    Dr. Patrica Qureshi    HX HEENT      T&A (childhood)    HX HERNIA REPAIR      umbilical    HX KNEE REPLACEMENT Left 01/05/2023    HX ORTHOPAEDIC Left 2016    MENISCUS REPAIR    HX ORTHOPAEDIC Right     JOINT SURGERY ON MIDDLE FINGER TO REMOVE A GROWTH-PT DENIES    HX TUBAL LIGATION  1990       Family History   Problem Relation Age of Onset    Macular Degen Mother     OSTEOARTHRITIS Mother     Osteoporosis Mother     Other Mother         tremor    Cancer Mother         Esophageal    Parkinsonism Father     Stroke Father         CVA (42's)    Bipolar Disorder Sister     Alzheimer's Disease Sister     No Known Problems Sister     No Known Problems Daughter     Attention Deficit Hyperactivity Disorder Daughter     No Known Problems Daughter     Anesth Problems Neg Hx         Social History     Socioeconomic History    Marital status:      Spouse name: Not on file    Number of children: Not on file    Years of education: Not on file    Highest education level: Not on file   Occupational History    Not on file   Tobacco Use    Smoking status: Never    Smokeless tobacco: Never   Vaping Use    Vaping Use: Never used   Substance and Sexual Activity    Alcohol use:  Yes     Alcohol/week: 1.0 standard drink     Types: 1 Shots of liquor per week     Comment: socially    Drug use: No    Sexual activity: Not Currently     Partners: Male   Other Topics Concern     Service Not Asked    Blood Transfusions Not Asked    Caffeine Concern Not Asked    Occupational Exposure Not Asked    [address] Hazards Not Asked    Sleep Concern Yes    Stress Concern Yes    Weight Concern Yes    Special Diet No    Back Care Not Asked    Exercise Not Asked    Bike Helmet Not Asked    Seat Belt Not Asked    Self-Exams Not Asked   Social History Narrative Not on file     Social Determinants of Health     Financial Resource Strain: Not on file   Food Insecurity: Not on file   Transportation Needs: Not on file   Physical Activity: Not on file   Stress: Not on file   Social Connections: Not on file   Intimate Partner Violence: Not on file   Housing Stability: Not on file         Orders Placed This Encounter    XR KNEE LT 3 V     Standing Status:   Future     Number of Occurrences:   1     Standing Expiration Date:   1/27/2024    REFERRAL TO PHYSICAL THERAPY     Referral Priority:   Routine     Referral Type:   PT/OT/ST     Referral Reason:   Specialty Services Required     Number of Visits Requested:   [address] Bernadette Matias M.D. was available for immediate consultation as the supervising physician. An electronic signature was used to authenticate this note.   -- Juan A Richter PA-C

## 2023-01-30 ENCOUNTER — HOSPITAL ENCOUNTER (OUTPATIENT)
Dept: PHYSICAL THERAPY | Age: 78
Discharge: HOME | End: 2023-01-30
Payer: MEDICARE

## 2023-01-30 PROCEDURE — 97161 PT EVAL LOW COMPLEX 20 MIN: CPT | Performed by: PHYSICAL THERAPIST

## 2023-01-30 PROCEDURE — 97110 THERAPEUTIC EXERCISES: CPT | Performed by: PHYSICAL THERAPIST

## 2023-01-30 PROCEDURE — 97140 MANUAL THERAPY 1/> REGIONS: CPT | Performed by: PHYSICAL THERAPIST

## 2023-01-30 NOTE — PROGRESS NOTES
PT INITIAL EVALUATION NOTE - [facility] 2-15    Patient Name: Shavon Barrett  Date:2023  : 1945  [x]  Patient  Verified  Payor: Edel Marie / Plan: [place] MED[place]ARE PART A & B / Product Type: Medicare /    In time: 3:15 pm  Out time: 4:10 pm  Total Treatment Time (min): 55  Total Timed Codes (min): 25  1:1 Treatment Time ( only): 25   Visit #: 1     Treatment Area: Left knee pain [M25.562]    SUBJECTIVE  Pain Level (0-10 scale): 3  Any medication changes, allergies to medications, adverse drug reactions, diagnosis change, or new procedure performed?: [] No    [x] Yes (see summary sheet for update)  Subjective:    Longstanding left knee pain. X-rays taken and diagnosed with OA. Held off having anything done as long as she could. 2023 had elective left TKR. She stayed 1 night in the hospital, received home health PT. She was having some calf pain so had a doppler US done and found negative for DVT. She complains of anterior and medial knee pain, swelling, difficulty bending her knee due to pain and stiffness. States has been working on her extension at home. She is currently not using a cane to walk. Her goals are to be able to walk distances without knee pain or limitation. OBJECTIVE/EXAMINATION  Posture:  Left knee flexed in supine, bruising noted anterior knee. Gait:  Ambulates without assistive device, left knee flexed, decreased stride length and lyn. Effusion: Mid Patella:  +9.5 cm  Supra Patella:  +9 cm    AROM:      Right Knee: 0 to 118 degrees      Left Knee: -12 to 70 degrees (supine)    PROM:  Left knee:  -9 to 60 degrees flexion (seated)           STRENGTH: Not tested. Special Tests:  Bebo's sign:  Tenderness left calf but taking a blood thinner. Palpation:  Tenderness patella, rivera-scar area, calf and shin. Modality rationale: decrease edema, decrease inflammation, and decrease pain to improve the patients ability to bend and walk.    Min Type Additional Details [] Estim: []Att   []Unatt        []TENS instruct                  []IFC  []Premod   []NMES                     []Other:  []w/US   []w/ice   []w/heat  Position:  Location:    []  Traction: [] Cervical       []Lumbar                       [] Prone          []Supine                       []Intermittent   []Continuous Lbs:  [] before manual  [] after manual  []w/heat    []  Ultrasound: []Continuous   [] Pulsed at:                           []1MHz   []3MHz Location:  W/cm2:    [] Paraffin         Location:   []w/heat    []  Ice     []  Heat  []  Ice massage Position:  Location:    []  Laser  []  Other: Position:  Location:   6   [x]  Vasopneumatic Device Pressure:       [x] lo [] med [] hi   Temperature: 34 degrees     [x] Skin assessment post-treatment:  [x]intact []redness- no adverse reaction    []redness - adverse reaction:     10 min Therapeutic Exercise:  [x] See flow sheet :   Rationale: increase ROM and increase strength to improve the patients ability to bend and walk. 15 min Manual Therapy:   Patella mobs  Faye-scar mobs (no cream)  PROM left knee in sitting    Rationale: decrease pain, increase ROM, and increase tissue extensibility to improve the patients ability to bend and walk.           With   [x] TE   [] TA   [] Neuro   [] SC   [] other: Patient Education: [x] Review HEP    [] Progressed/Changed HEP based on:   [] positioning   [] body mechanics   [] transfers   [] heat/ice application    [] other:            Pain Level (0-10 scale) post treatment: 3    ASSESSMENT/Changes in Function:     [x]  See Plan of Blayne Lainez V, PT 1/30/2023

## 2023-01-30 NOTE — PROGRESS NOTES
Physical Therapy at [facility],   a part of [address] [facility]  [address] [facility]  [place], [address]  Phone: [phone]  Fax: [phone]    Plan of Care/Statement of Necessity for Physical Therapy Services  2-15    Patient name: Naomie Jarquin  : 1945  Provider#: [id number]  Referral source: Nika Mock      Medical/Treatment Diagnosis: Left knee pain [M25.562]     Prior Hospitalization: see medical history     Comorbidities: None  Prior Level of Function: Able to bend her knee and walk with mild pain 1 year ago. Medications: Verified on Patient Summary List  Start of Care: 2023     Onset Date: 2023   The Plan of Care and following information is based on the information from the initial evaluation. Assessment/ key information: This patient presents with left knee pain, tenderness, swelling, decreased ROM, decreased strength, deviated gait pattern and impaired function.     Evaluation Complexity History LOW Complexity : Zero comorbidities / personal factors that will impact the outcome / POC; Examination LOW Complexity : 1-2 Standardized tests and measures addressing body structure, function, activity limitation and / or participation in recreation  ;Presentation LOW Complexity : Stable, uncomplicated  ;Clinical Decision Making MEDIUM Complexity : FOTO score of 26-74  Overall Complexity Rating: LOW     Problem List: pain affecting function, decrease ROM, decrease strength, edema affecting function, impaired gait/ balance, decrease ADL/ functional abilitiies, decrease activity tolerance, decrease flexibility/ joint mobility, and decrease transfer abilities   Treatment Plan may include any combination of the following: Therapeutic exercise, Neuromuscular reeducation, Manual therapy, Therapeutic activity, Self care/home management, Electric stim unattended , Vasopneumatic device, Gait training, and Ultrasound  Patient / Family readiness to learn indicated by: asking questions, trying to perform skills, and interest  Persons(s) to be included in education: patient (P)  Barriers to Learning/Limitations: None  Patient Goal (s): Less pain, more motion.   Patient Self Reported Health Status: good  Rehabilitation Potential: good    Short Term Goals: To be accomplished in 4 weeks:  1. Decrease effusion to +3 cm to reduce quad inhibition and increase flexion ROM. 2.  AROM -3 to 90 degrees, PROM -1 to 100 degrees to improve functional bending. Long Term Goals: To be accomplished in 8  weeks:  1. AROM 0 to 110 degrees, PROM 0 to 115 degrees to improve functional bending and ability to walk stairs. 2.  Ambulates with slight gait deviation without assistive device. 3.  Able to bend her knee, walk 1 flight of stairs without difficulty. Frequency / Duration: Patient to be seen 2 times per week for 8 weeks. Patient/ Caregiver education and instruction: exercises    [x]  Plan of care has been reviewed with PTA    Certification Period: 1/30/2023-4/29/2023    Gabbie Ziegler, PT 1/30/2023    ________________________________________________________________________    I certify that the above Therapy Services are being furnished while the patient is under my care. I agree with the treatment plan and certify that this therapy is necessary.     Physician's Signature:____________________  Date:____________Time: _________         Manuel Amos

## 2023-02-02 ENCOUNTER — HOSPITAL ENCOUNTER (OUTPATIENT)
Dept: PHYSICAL THERAPY | Age: 78
Discharge: HOME | End: 2023-02-02
Payer: MEDICARE

## 2023-02-02 PROCEDURE — 97016 VASOPNEUMATIC DEVICE THERAPY: CPT | Performed by: PHYSICAL THERAPIST

## 2023-02-02 PROCEDURE — 97110 THERAPEUTIC EXERCISES: CPT | Performed by: PHYSICAL THERAPIST

## 2023-02-02 PROCEDURE — 97140 MANUAL THERAPY 1/> REGIONS: CPT | Performed by: PHYSICAL THERAPIST

## 2023-02-03 NOTE — PROGRESS NOTES
PT DAILY TREATMENT NOTE - [facility] 2-15    Patient Name: Moira Porter  Date:2023  : 1945  [x]  Patient  Verified  Payor: Gallito Feldman / Plan: [place] MEDIC[place] PART A & B / Product Type: Medicare /    In time: 11:55 am  Out time: 12:45 pm  Total Treatment Time (min): 50  Total Timed Codes (min): 30  1:1 Treatment Time ( only): 30   Visit #: 2    Treatment Area: Left knee pain [M25.562]    SUBJECTIVE  Pain Level (0-10 scale): 3  Any medication changes, allergies to medications, adverse drug reactions, diagnosis change, or new procedure performed?: [x] No    [] Yes (see summary sheet for update)  Subjective functional status/changes:   [] No changes reported  States has been sore but no worse. OBJECTIVE  Effusion:   Mid patella +4 cm   Supra patella +5 cm    PROM: 93 degrees in sitting    Modality rationale: decrease edema, decrease inflammation, and decrease pain to improve the patients ability to bend and walk.    Min Type Additional Details       [] Estim: []Att   []Unatt    []TENS instruct                  []IFC  []Premod   []NMES                     []Other:  []w/US   []w/ice   []w/heat  Position:  Location:       []  Traction: [] Cervical       []Lumbar                       [] Prone          []Supine                       []Intermittent   []Continuous Lbs:  [] before manual  [] after manual  []w/heat    []  Ultrasound: []Continuous   [] Pulsed                       at: []1MHz   []3MHz Location:  W/cm2:    [] Paraffin         Location:   []w/heat    []  Ice     []  Heat  []  Ice massage Position:  Location:    []  Laser  []  Other: Position:  Location:   10   [x]  Vasopneumatic Device Pressure:       [] lo [x] med [] hi   Temperature: 40 degrees     [x] Skin assessment post-treatment:  [x]intact []redness- no adverse reaction    []redness - adverse reaction:     10 min Therapeutic Exercise:  [x] See flow sheet :   Rationale: increase ROM and increase strength to improve the patients ability to bend and walk. 20 min Manual Therapy:   Patella mobs  Faye-scar mobs (no cream)  PROM left knee in sitting     Rationale: decrease pain, increase ROM, and increase tissue extensibility to improve the patients ability to bend and walk. min Gait Training:  ___ feet with ___ device on level surfaces with ___ level of assist   Rationale: improve coordination, improve balance, and increase proprioception  to improve the patients ability to walk with normal pattern. With   [x] TE   [] TA   [] Neuro   [] SC   [] other: Patient Education: [x] Review HEP    [] Progressed/Changed HEP based on:   [] positioning   [] body mechanics   [] transfers   [] heat/ice application    [] other:      Other Objective/Functional Measures:      Pain Level (0-10 scale) post treatment: 2    ASSESSMENT/Changes in Function: Decreased swelling, increased PROM today. Patient will continue to benefit from skilled PT services to modify and progress therapeutic interventions, address functional mobility deficits, address ROM deficits, address strength deficits, analyze and address soft tissue restrictions, analyze and cue movement patterns, analyze and modify body mechanics/ergonomics, and assess and modify postural abnormalities to attain remaining goals.      []  See Plan of Care  []  See progress note/recertification  []  See Discharge Summary         Progress towards goals / Updated goals:    PLAN  [x]  Upgrade activities as tolerated     [x]  Continue plan of care  []  Update interventions per flow sheet       []  Discharge due to:_  []  Other:_      Abbie Gutiérrez, PT 2/2/2023

## 2023-02-07 ENCOUNTER — HOSPITAL ENCOUNTER (OUTPATIENT)
Dept: PHYSICAL THERAPY | Age: 78
Discharge: HOME | End: 2023-02-07
Payer: MEDICARE

## 2023-02-07 PROCEDURE — 97110 THERAPEUTIC EXERCISES: CPT

## 2023-02-07 PROCEDURE — 97140 MANUAL THERAPY 1/> REGIONS: CPT

## 2023-02-07 NOTE — PROGRESS NOTES
PT DAILY TREATMENT NOTE - [facility] 2-15    Patient Name: Raissa Ferrara  Date:2023  : 1945  [x]  Patient  Verified  Payor: [place] MEDICARE / Plan: [place] MEDICARE PART A & B / Product Type: Medicare /    In time: 11:55AM  Out time: 12:55PM  Total Treatment Time (min): 60  Total Timed Codes (min): 45  1:1 Treatment Time ( [address] only): 39   Visit #:  2    Treatment Area: Left knee pain [M25.562]    SUBJECTIVE  Pain Level (0-10 scale): 5  Any medication changes, allergies to medications, adverse drug reactions, diagnosis change, or new procedure performed?: [x] No    [] Yes (see summary sheet for update)  Subjective functional status/changes:   [] No changes reported  Reports that she is having pain in L knee- states that she would never have this surgery again bc of pain and limittations she is currently experiencing    OBJECTIVE    Modality rationale: decrease edema, decrease inflammation, and decrease pain to improve the patients ability to tolerate increase in activity   Min Type Additional Details       [] Estim: []Att   []Unatt    []TENS instruct                  []IFC  []Premod   []NMES                     []Other:  []w/US   []w/ice   []w/heat  Position:  Location:       []  Traction: [] Cervical       []Lumbar                       [] Prone          []Supine                       []Intermittent   []Continuous Lbs:  [] before manual  [] after manual  []w/heat    []  Ultrasound: []Continuous   [] Pulsed                       at: []1MHz   []3MHz Location:  W/cm2:    [] Paraffin         Location:   []w/heat    []  Ice     []  Heat  []  Ice massage Position:  Location:    []  Laser  []  Other: Position:  Location:      [x]  Vasopneumatic Device Pressure:       [] lo [x] med [] hi   Temperature:      [x] Skin assessment post-treatment:  [x]intact []redness- no adverse reaction    []redness - adverse reaction:     30 min Therapeutic Exercise:  [x] See flow sheet :   Rationale: increase ROM, increase strength, improve balance, and increase proprioception to improve the patients ability to increase ADLs      15 min Manual Therapy: scar mobs, JT mobs, PROM/Manual S L knee    Rationale: decrease pain, increase ROM, increase tissue extensibility, and decrease edema  to improve the patients ability to tolertae incraese in ADLs              With   [x] TE   [] TA   [] Neuro   [] SC   [] other: Patient Education: [x] Review HEP    [] Progressed/Changed HEP based on:   [] positioning   [] body mechanics   [] transfers   [] heat/ice application    [] other:      Other Objective/Functional Measures: AROM -10. PROM L kne -5-90     Pain Level (0-10 scale) post treatment: 5  She does not tolerate passive extension with gameready on -     ASSESSMENT/Changes in Function:   Decreased ROM and strength left knee s/p surgery. Extension lag 10 degrees with assisted SLR flexion. We discussed not using support behind knee at home when at rest.  Moderate edema still noted in anterior knee. Incision dry and intact. Patient will continue to benefit from skilled PT services to address functional mobility deficits, address ROM deficits, address strength deficits, and analyze and address soft tissue restrictions to attain remaining goals.      [x]  See Plan of Care  []  See progress note/recertification  []  See Discharge Summary           PLAN  [x]  Upgrade activities as tolerated     []  Continue plan of care  []  Update interventions per flow sheet       []  Discharge due to:_  []  Other:_      Herson Sands, PT 2/7/2023

## 2023-02-09 ENCOUNTER — HOSPITAL ENCOUNTER (OUTPATIENT)
Dept: PHYSICAL THERAPY | Age: 78
Discharge: HOME | End: 2023-02-09
Payer: MEDICARE

## 2023-02-09 PROCEDURE — 97140 MANUAL THERAPY 1/> REGIONS: CPT | Performed by: PHYSICAL THERAPIST

## 2023-02-09 PROCEDURE — 97110 THERAPEUTIC EXERCISES: CPT | Performed by: PHYSICAL THERAPIST

## 2023-02-09 PROCEDURE — 97016 VASOPNEUMATIC DEVICE THERAPY: CPT | Performed by: PHYSICAL THERAPIST

## 2023-02-09 NOTE — PROGRESS NOTES
PT DAILY TREATMENT NOTE - [facility] 2-15    Patient Name: Derek Villareal  Date:2023  : 1945  [x]  Patient  Verified  Payor: [place] MEDICARE / Plan: [place] MEDICARE PART A & B / Product Type: Medicare /    In time: 12:00 PM  Out time: 12:55 PM  Total Treatment Time (min): 55  Total Timed Codes (min): 45  1:1 Treatment Time ( [address] only): 40  Visit #:  3    Treatment Area: Left knee pain [M25.562]    SUBJECTIVE  Pain Level (0-10 scale): 7  Any medication changes, allergies to medications, adverse drug reactions, diagnosis change, or new procedure performed?: [x] No    [] Yes (see summary sheet for update)  Subjective functional status/changes:   [] No changes reported  \"It was rough after last time. I didn't want to come today. \"    OBJECTIVE  Tenderness over pes anserine insertion  PROM 103 degrees flexion    Modality rationale: decrease edema, decrease inflammation, and decrease pain to improve the patients ability to tolerate increase in activity   Min Type Additional Details       [] Estim: []Att   []Unatt    []TENS instruct                  []IFC  []Premod   []NMES                     []Other:  []w/US   []w/ice   []w/heat  Position:  Location:       []  Traction: [] Cervical       []Lumbar                       [] Prone          []Supine                       []Intermittent   []Continuous Lbs:  [] before manual  [] after manual  []w/heat    []  Ultrasound: []Continuous   [] Pulsed                       at: []1MHz   []3MHz Location:  W/cm2:    [] Paraffin         Location:   []w/heat    []  Ice     []  Heat  []  Ice massage Position:  Location:    []  Laser  []  Other: Position:  Location:     10 [x]  Vasopneumatic Device Pressure:       [] lo [x] med [] hi   Temperature:      [x] Skin assessment post-treatment:  [x]intact []redness- no adverse reaction    []redness - adverse reaction:     15 min Therapeutic Exercise:  [x] See flow sheet :   Rationale: increase ROM, increase strength, improve balance, and increase proprioception to improve the patients ability to increase ADLs      25 min Manual Therapy:   scar mobs,   Patella mobs  Seated knee joint anterior and posterior mobs  STM hamstring and gastroc muscles  Manual hamstring stretch  PROM L knee flexion   Rationale: decrease pain, increase ROM, increase tissue extensibility, and decrease edema  to improve the patients ability to tolertae incraese in ADLs          With   [x] TE   [] TA   [] Neuro   [] SC   [] other: Patient Education: [x] Review HEP    [] Progressed/Changed HEP based on:   [] positioning   [] body mechanics   [] transfers   [] heat/ice application    [] other:      Other Objective/Functional Measures:      Pain Level (0-10 scale) post treatment: 2    ASSESSMENT/Changes in Function: Knee effusion/edema, pes anserine irritation causing knee pain. Patient will continue to benefit from skilled PT services to address functional mobility deficits, address ROM deficits, address strength deficits, and analyze and address soft tissue restrictions to attain remaining goals.      [x]  See Plan of Care  []  See progress note/recertification  []  See Discharge Summary    Progress towards goals / Updated goals:         PLAN  [x]  Upgrade activities as tolerated     []  Continue plan of care  []  Update interventions per flow sheet       []  Discharge due to:_  []  Other:_      Nadia Collier, PT 2/9/2023

## 2023-02-14 ENCOUNTER — HOSPITAL ENCOUNTER (OUTPATIENT)
Dept: PHYSICAL THERAPY | Age: 78
Discharge: HOME | End: 2023-02-14
Payer: MEDICARE

## 2023-02-14 PROCEDURE — 97110 THERAPEUTIC EXERCISES: CPT | Performed by: PHYSICAL THERAPIST

## 2023-02-14 PROCEDURE — 97140 MANUAL THERAPY 1/> REGIONS: CPT | Performed by: PHYSICAL THERAPIST

## 2023-02-14 NOTE — PROGRESS NOTES
PT DAILY TREATMENT NOTE - [facility] 2-15    Patient Name: Haley Phoenix  Date:2023  : 1945  [x]  Patient  Verified  Payor: [place] MEDICARE / Plan: [place] MEDICARE PART A & B / Product Type: Medicare /    In time: 12:00 PM  Out time: 1:00 PM  Total Treatment Time (min): 60  Total Timed Codes (min): 45  1:1 Treatment Time ( [address] only): 39  Visit #:  5    Treatment Area: Left knee pain [M25.562]    SUBJECTIVE  Pain Level (0-10 scale): 4  Any medication changes, allergies to medications, adverse drug reactions, diagnosis change, or new procedure performed?: [x] No    [] Yes (see summary sheet for update)  Subjective functional status/changes:   [] No changes reported  \"I feel something in my knee that was different than what I heard prior to surgery. \"  Describes as a non painful clunking sound.     OBJECTIVE  Effusion:   Mid patella +1.5 cm   Supra patella +2.5 cm    AROM -5 degrees extension    PROM 107 degrees flexion    Modality rationale: decrease edema, decrease inflammation, and decrease pain to improve the patients ability to tolerate increase in activity   Min Type Additional Details       [] Estim: []Att   []Unatt    []TENS instruct                  []IFC  []Premod   []NMES                     []Other:  []w/US   []w/ice   []w/heat  Position:  Location:       []  Traction: [] Cervical       []Lumbar                       [] Prone          []Supine                       []Intermittent   []Continuous Lbs:  [] before manual  [] after manual  []w/heat    []  Ultrasound: []Continuous   [] Pulsed                       at: []1MHz   []3MHz Location:  W/cm2:    [] Paraffin         Location:   []w/heat    []  Ice     []  Heat  []  Ice massage Position:  Location:    []  Laser  []  Other: Position:  Location:     15 [x]  Vasopneumatic Device Pressure:       [] lo [x] med [] hi   Temperature:      [x] Skin assessment post-treatment:  [x]intact []redness- no adverse reaction    []redness - adverse reaction:     15 min Therapeutic Exercise:  [x] See flow sheet :   Rationale: increase ROM, increase strength, improve balance, and increase proprioception to improve the patients ability to increase ADLs      25 min Manual Therapy:   scar mobs,   Patella mobs  Seated knee joint anterior and posterior mobs  STM hamstring and gastroc muscles  Manual hamstring stretch  PROM L knee flexion   Rationale: decrease pain, increase ROM, increase tissue extensibility, and decrease edema  to improve the patients ability to tolertae incraese in ADLs          With   [x] TE   [] TA   [] Neuro   [] SC   [] other: Patient Education: [x] Review HEP    [] Progressed/Changed HEP based on:   [] positioning   [] body mechanics   [] transfers   [] heat/ice application    [] other:      Other Objective/Functional Measures:      Pain Level (0-10 scale) post treatment: 2    ASSESSMENT/Changes in Function: Knee components likely causing clunk she describes in her knee. ROM improving. Patient will continue to benefit from skilled PT services to address functional mobility deficits, address ROM deficits, address strength deficits, and analyze and address soft tissue restrictions to attain remaining goals.      [x]  See Plan of Care  []  See progress note/recertification  []  See Discharge Summary    Progress towards goals / Updated goals:         PLAN  [x]  Upgrade activities as tolerated     []  Continue plan of care  []  Update interventions per flow sheet       []  Discharge due to:_  []  Other:_      Lesa Adrian, PT 2/14/2023

## 2023-02-16 ENCOUNTER — HOSPITAL ENCOUNTER (OUTPATIENT)
Dept: PHYSICAL THERAPY | Age: 78
Discharge: HOME | End: 2023-02-16
Payer: MEDICARE

## 2023-02-16 PROCEDURE — 97016 VASOPNEUMATIC DEVICE THERAPY: CPT | Performed by: PHYSICAL THERAPIST

## 2023-02-16 PROCEDURE — 97140 MANUAL THERAPY 1/> REGIONS: CPT | Performed by: PHYSICAL THERAPIST

## 2023-02-16 PROCEDURE — 97110 THERAPEUTIC EXERCISES: CPT | Performed by: PHYSICAL THERAPIST

## 2023-02-17 NOTE — PROGRESS NOTES
PT DAILY TREATMENT NOTE - [facility] 2-15    Patient Name: Kelton Ruffin  Date:2023  : 1945  [x]  Patient  Verified  Payor: [place] MEDICARE / Plan: VA MEDICARE PART A & B / Product Type: Medicare /    In time: 12:00 PM  Out time: 12:35 PM  Total Treatment Time (min): 35  Total Timed Codes (min): 25  1:1 Treatment Time ( [address] only): 25  Visit #:  6    Treatment Area: Left knee pain [M25.562]    SUBJECTIVE  Pain Level (0-10 scale): 3  Any medication changes, allergies to medications, adverse drug reactions, diagnosis change, or new procedure performed?: [x] No    [] Yes (see summary sheet for update)  Subjective functional status/changes:   [] No changes reported  \"I only can give you 30 minutes today. \"  States knee is better but still sore.     OBJECTIVE:  PROM 112 degrees flexion    Modality rationale: decrease edema, decrease inflammation, and decrease pain to improve the patients ability to tolerate increase in activity   Min Type Additional Details       [] Estim: []Att   []Unatt    []TENS instruct                  []IFC  []Premod   []NMES                     []Other:  []w/US   []w/ice   []w/heat  Position:  Location:       []  Traction: [] Cervical       []Lumbar                       [] Prone          []Supine                       []Intermittent   []Continuous Lbs:  [] before manual  [] after manual  []w/heat    []  Ultrasound: []Continuous   [] Pulsed                       at: []1MHz   []3MHz Location:  W/cm2:    [] Paraffin         Location:   []w/heat    []  Ice     []  Heat  []  Ice massage Position:  Location:    []  Laser  []  Other: Position:  Location:     10 [x]  Vasopneumatic Device Pressure:       [] lo [x] med [] hi   Temperature:      [x] Skin assessment post-treatment:  [x]intact []redness- no adverse reaction    []redness - adverse reaction:     15 min Therapeutic Exercise:  [x] See flow sheet :   Rationale: increase ROM, increase strength, improve balance, and increase proprioception to improve the patients ability to increase ADLs      10 min Manual Therapy:   scar mobs  Patella mobs  Seated knee joint anterior and posterior mobs  STM hamstring and gastroc muscles (omit)  Manual hamstring stretch (omit)  PROM L knee flexion   Rationale: decrease pain, increase ROM, increase tissue extensibility, and decrease edema  to improve the patients ability to tolertae incraese in ADLs          With   [x] TE   [] TA   [] Neuro   [] SC   [] other: Patient Education: [x] Review HEP    [] Progressed/Changed HEP based on:   [] positioning   [] body mechanics   [] transfers   [] heat/ice application    [] other:      Other Objective/Functional Measures:      Pain Level (0-10 scale) post treatment: 2    ASSESSMENT/Changes in Function: Increased flexion PROM. Patient will continue to benefit from skilled PT services to address functional mobility deficits, address ROM deficits, address strength deficits, and analyze and address soft tissue restrictions to attain remaining goals.      [x]  See Plan of Care  []  See progress note/recertification  []  See Discharge Summary    Progress towards goals / Updated goals:         PLAN  [x]  Upgrade activities as tolerated     []  Continue plan of care  []  Update interventions per flow sheet       []  Discharge due to:_  []  Other:_      Joel Rowan, PT 2/16/2023

## 2023-02-21 ENCOUNTER — HOSPITAL ENCOUNTER (OUTPATIENT)
Dept: PHYSICAL THERAPY | Age: 78
Discharge: HOME | End: 2023-02-21
Payer: MEDICARE

## 2023-02-21 PROCEDURE — 97140 MANUAL THERAPY 1/> REGIONS: CPT | Performed by: PHYSICAL THERAPIST

## 2023-02-21 PROCEDURE — 97016 VASOPNEUMATIC DEVICE THERAPY: CPT | Performed by: PHYSICAL THERAPIST

## 2023-02-21 PROCEDURE — 97110 THERAPEUTIC EXERCISES: CPT | Performed by: PHYSICAL THERAPIST

## 2023-02-22 NOTE — PROGRESS NOTES
PT DAILY TREATMENT NOTE - [facility] 2-15    Patient Name: Arnoldo Oliva  Date:2023  : 1945  [x]  Patient  Verified  Payor: [place] MED[place] / Plan: [place] MEDICARE PART A & B / Product Type: Medicare /    In time: 12:00 PM  Out time: 1:05 PM  Total Treatment Time (min): 65  Total Timed Codes (min): 45  1:1 Treatment Time ( [address] only): 39  Visit #:  6    Treatment Area: Left knee pain [M25.562]    SUBJECTIVE  Pain Level (0-10 scale): 1-2  Any medication changes, allergies to medications, adverse drug reactions, diagnosis change, or new procedure performed?: [x] No    [] Yes (see summary sheet for update)  Subjective functional status/changes:   [] No changes reported  \"It's a little better. Still feels stiff. \"    OBJECTIVE:  Effusion:  Mid patella +2 cm  Supra patella +2.5 cm    Positive patella tap test for effusion    PROM 112 degrees flexion    Modality rationale: decrease edema, decrease inflammation, and decrease pain to improve the patients ability to tolerate increase in activity   Min Type Additional Details       [] Estim: []Att   []Unatt    []TENS instruct                  []IFC  []Premod   []NMES                     []Other:  []w/US   []w/ice   []w/heat  Position:  Location:       []  Traction: [] Cervical       []Lumbar                       [] Prone          []Supine                       []Intermittent   []Continuous Lbs:  [] before manual  [] after manual  []w/heat    []  Ultrasound: []Continuous   [] Pulsed                       at: []1MHz   []3MHz Location:  W/cm2:    [] Paraffin         Location:   []w/heat    []  Ice     []  Heat  []  Ice massage Position:  Location:    []  Laser  []  Other: Position:  Location:     15 [x]  Vasopneumatic Device Pressure:       [] lo [x] med [] hi   Temperature:      [x] Skin assessment post-treatment:  [x]intact []redness- no adverse reaction    []redness - adverse reaction:     25 min Therapeutic Exercise:  [x] See flow sheet :   Rationale: increase ROM, increase strength, improve balance, and increase proprioception to improve the patients ability to increase ADLs      20 min Manual Therapy:   scar mobs  Patella mobs  Seated knee joint anterior and posterior mobs  STM hamstring and gastroc muscles (omit)  Manual hamstring stretch (omit)  PROM L knee flexion   Rationale: decrease pain, increase ROM, increase tissue extensibility, and decrease edema  to improve the patients ability to tolertae incraese in ADLs          With   [x] TE   [] TA   [] Neuro   [] SC   [] other: Patient Education: [x] Review HEP    [] Progressed/Changed HEP based on:   [] positioning   [] body mechanics   [] transfers   [] heat/ice application    [] other:      Other Objective/Functional Measures:      Pain Level (0-10 scale) post treatment: 1    ASSESSMENT/Changes in Function: Swelling noted. No loss of flexion PROM, extension ROM improving. Patient will continue to benefit from skilled PT services to address functional mobility deficits, address ROM deficits, address strength deficits, and analyze and address soft tissue restrictions to attain remaining goals.      [x]  See Plan of Care  []  See progress note/recertification  []  See Discharge Summary    Progress towards goals / Updated goals:         PLAN  [x]  Upgrade activities as tolerated     []  Continue plan of care  []  Update interventions per flow sheet       []  Discharge due to:_  []  Other:_      Fredy Frias, PT 2/21/2023

## 2023-02-23 ENCOUNTER — OFFICE VISIT (OUTPATIENT)
Dept: ORTHOPEDIC SURGERY | Age: 78
End: 2023-02-23
Payer: MEDICARE

## 2023-02-23 ENCOUNTER — APPOINTMENT (OUTPATIENT)
Dept: PHYSICAL THERAPY | Age: 78
End: 2023-02-23
Payer: MEDICARE

## 2023-02-23 VITALS — HEIGHT: 65 IN | WEIGHT: 194.8 LBS | BODY MASS INDEX: 32.46 KG/M2

## 2023-02-23 DIAGNOSIS — Z96.652 STATUS POST TOTAL KNEE REPLACEMENT, LEFT: Primary | ICD-10-CM

## 2023-02-23 DIAGNOSIS — Z09 SURGERY FOLLOW-UP: ICD-10-CM

## 2023-02-23 PROCEDURE — 99024 POSTOP FOLLOW-UP VISIT: CPT | Performed by: ORTHOPAEDIC SURGERY

## 2023-02-23 NOTE — PROGRESS NOTES
Lazarus Cheeks (: 1945) is a 78 y.o. female, patient, here for evaluation of the following chief complaint(s):  Surgical Follow-up (PO #2: LTK: 22)       SUBJECTIVE/OBJECTIVE:  Lazarus Cheeks presents today for routine follow-up approximately 7 weeks out from left primary total knee replacement. She reports persistent but very slowly improving pain. She does not feel that she is made enough progress yet to say that she is glad she went through with the surgery. Relates significant swelling and subjective tightness. She has been working with outpatient therapy. PHYSICAL EXAM:  Vitals: Ht 5' 5\" (1.651 m)   Wt 194 lb 12.8 oz (88.4 kg)   BMI 32.42 kg/m²   Body mass index is 32.42 kg/m². 78y.o. year old F, no distress. Ambulates with mild limp at start up, then gait normalizes. Left knee is in neutral alignment. Healed midline anterior scar. Mild residual local knee swelling and warmth, appropriate. Knee lacks approximately 5 degrees of active extension, a few degrees passively. Flexion to approximately 105 degrees. Preoperative range of motion 5 to approximately 120 degrees. Mild distal edema. No calf tenderness    IMAGING:  Radiographs: No new images today. ASSESSMENT/PLAN:  1. Status post total knee replacement, left  -     REFERRAL TO PHYSICAL THERAPY  2. Surgery follow-up    She was reassured. Overall she is doing much better than she perceives. We will continue outpatient therapy for another 4 weeks with focus on range of motion and strengthening. We reviewed a few additional exercises to work on both knee flexion and extension at home on her own in between therapy visits. Plan return in 4 weeks for clinical recheck. If she feels that she turns the corner by then, she may cancel that appointment and plan to return for routine 1 year postop x-ray in January. No follow-ups on file.               Review Of Systems  ROS    Positive for: Musculoskeletal  Last edited by Agnieszka Dahl on 2/23/2023  3:01 PM.         Patient denies any recent fever, chills, nausea, vomiting, chest pain, or shortness of breath. Allergies   Allergen Reactions    Ace Inhibitors Cough    Thiazides Photosensitivity       Current Outpatient Medications   Medication Sig    ondansetron (ZOFRAN ODT) 4 mg disintegrating tablet Take 1 Tablet by mouth every eight (8) hours as needed for Nausea or Vomiting. aspirin delayed-release 81 mg tablet Take 1 Tablet by mouth two (2) times a day. Indications: blood clot prevention    senna-docusate (PERICOLACE) 8.6-50 mg per tablet Take 1 Tablet by mouth two (2) times daily as needed for Constipation. acetaminophen (TYLENOL) 500 mg tablet Take 1 Tablet by mouth every four (4) hours. losartan (COZAAR) 100 mg tablet TAKE 1 TABLET BY MOUTH EVERY EVENING    amLODIPine (NORVASC) 5 mg tablet TAKE 1 TABLET BY MOUTH EVERY EVENING    multivitamin, tx-iron-ca-min (THERA-M w/ IRON) 9 mg iron-400 mcg tab tablet Take 1 Tablet by mouth daily. aspirin 81 mg cap Take 1 Tablet by mouth daily. rosuvastatin (CRESTOR) 20 mg tablet TAKE 1 TABLET BY MOUTH EVERY NIGHT    metFORMIN ER (GLUCOPHAGE XR) 500 mg tablet Take 1 Tablet by mouth two (2) times daily (with meals). (Patient taking differently: Take 500 mg by mouth two (2) times a day.)    cholecalciferol (VITAMIN D3) (2,000 UNITS /50 MCG) cap capsule Take 2,000 Units by mouth every evening. VIT C/E/ZN/COPPR/LUTEIN/ZEAXAN (PRESERVISION AREDS 2 PO) Take 1 Tablet by mouth two (2) times a day. No current facility-administered medications for this visit.        Past Medical History:   Diagnosis Date    Arrhythmia     HEART PALPITATIONS OCCASIONALLY    Arthritis     Chronic pain 2016    LEFT KNEE    Colon polyps     hyperplastic    Hypercholesterolemia     Hypertension     Nausea & vomiting     Psychiatric disorder     ANXIETY AND DEPRESSION        Past Surgical History:   Procedure Laterality Date ENDOSCOPY, COLON, DIAGNOSTIC  08/2009    HX CAROTID ENDARTERECTOMY Right 07/01/2019    Dr. Kristie Phillips    HX HEENT      T&A (childhood)    HX HERNIA REPAIR      umbilical    HX KNEE REPLACEMENT Left 01/05/2023    HX ORTHOPAEDIC Left 2016    MENISCUS REPAIR    HX ORTHOPAEDIC Right     JOINT SURGERY ON MIDDLE FINGER TO REMOVE A GROWTH-PT DENIES    HX TUBAL LIGATION  1990       Family History   Problem Relation Age of Onset    Macular Degen Mother     OSTEOARTHRITIS Mother     Osteoporosis Mother     Other Mother         tremor    Cancer Mother         Esophageal    Parkinsonism Father     Stroke Father         CVA (42's)    Bipolar Disorder Sister     Alzheimer's Disease Sister     No Known Problems Sister     No Known Problems Daughter     Attention Deficit Hyperactivity Disorder Daughter     No Known Problems Daughter     Anesth Problems Neg Hx         Social History     Socioeconomic History    Marital status:      Spouse name: Not on file    Number of children: Not on file    Years of education: Not on file    Highest education level: Not on file   Occupational History    Not on file   Tobacco Use    Smoking status: Never    Smokeless tobacco: Never   Vaping Use    Vaping Use: Never used   Substance and Sexual Activity    Alcohol use:  Yes     Alcohol/week: 1.0 standard drink     Types: 1 Shots of liquor per week     Comment: socially    Drug use: No    Sexual activity: Not Currently     Partners: Male   Other Topics Concern     Service Not Asked    Blood Transfusions Not Asked    Caffeine Concern Not Asked    Occupational Exposure Not Asked    Hobby Hazards Not Asked    Sleep Concern Yes    Stress Concern Yes    Weight Concern Yes    Special Diet No    Back Care Not Asked    Exercise Not Asked    Bike Helmet Not Asked    Seat Belt Not Asked    Self-Exams Not Asked   Social History Narrative    Not on file     Social Determinants of Health     Financial Resource Strain: Not on file   Food Insecurity: Not on file   Transportation Needs: Not on file   Physical Activity: Not on file   Stress: Not on file   Social Connections: Not on file   Intimate Partner Violence: Not on file   Housing Stability: Not on file       Orders Placed This Encounter    REFERRAL TO PHYSICAL THERAPY     Referral Priority:   Routine     Referral Type:   PT/OT/ST     Referral Reason:   Specialty Services Required     Number of Visits Requested:   1        An electronic signature was used to authenticate this note.   -- Triston Marie MD

## 2023-02-23 NOTE — LETTER
2/23/2023    Patient: Dolores Jolly   YOB: 1945   Date of Visit: 2/23/2023     Steve Ahn, [address]    Dear Steve Ahn MD,      Thank you for referring Ms. Jo-Ann Valle to [facility] for evaluation. My notes for this consultation are attached. If you have questions, please do not hesitate to call me. I look forward to following your patient along with you.       Sincerely,    Talat Davison MD Occupational Therapy Progress Note      Visit Count: 11/5: OT & PTconfirmed that additional visits were available.   (combined PT/OT for 60 from 6-1-19)                  Insurance Information: [employer]  Precautions: Fall Risk  Cardiac history     SUBJECTIVE   Present and reporting subjective information: Patient transitioned well from ST session and therapist handed off to mother at end of session.      Patient reports liking Frozen and [employer].        Current Pain/Behaviors: Patient did not report or demonstrate any signs of pain during session.     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, history of NEC, history of intestinal strictures   congenital hypothyroidism, gastroesophagel reflux  transplant evaluation 5/2015 at [facility]           OBJECTIVE      GOALS:  Short Term Goals:  Goal Status Established On Duration Comments   As measure of increased visual motor skills Pauline will cut out [place] shape with min a within 1/4-1/2 inch of line in 50 % of opportunities    Progressing 08/19    3 months     As meause of increased self help skills Sandra will don socks independently with set up in 25% of trials      Progressing 05/19 3 months     As measure of increased visual motor skills, Sandra will write short words on lined paper with 75% legibility     Progressing 10/29/19    3 months     As measure of increased visual motor skills, Sandra will write name in without visual model with all letters legible, 75% of opportunities.    Progressing 10/29/19    3 months                    Long Term Goals:  Goal Status Established On Duration Comments   As measure of increased visual motor skills Pauline will cut out [place] shape with min a within 1/4-1/2 inch of line in 75 % of opportunities       Progressing 10/29/19    6 months     As meause of increased self help skills Sandra will don socks independently with set up in 50% of trials    Progressing 10/29/19    6 months     As measure of increased visual motor skills, Sandra will write short words on lined paper with 90% legibility     Progressing 10/29/19    6 months                                   Treatment    Patient benefited from used of visual schedule with patient preferred task as last activity on list. Visual schedule encouraged patient to complete task, increase willingness to participate and re-direct when patient became distracted.    Tracing letters on dry erase board, pre-hand writing skills- object manipulation and cutting with scissors, hand strengthening with theraputty completed on this date        Therapeutic Exercise Grid  Intervention Assist Level Equipment Used Comments   Patient completed 5 minutes of theraputty exercises; \" looking for treasure\". Goal of increasing hand and finger strength to improve FMC and handwriting skills. MIN verbal cueing to \"pull, fold, squeeze\" to facilitate hand strength. Pink theraputty completed    Use of resistive (red) clothespins were used to / \"rescue \" small .5-1 inch rubber animals; 20 reps completed to increase pinch &  strength      completed                                                 Therapeutic Activities Grid  Intervention Assist Level Equipment Used Comments   Grasp and visual perceptual skills  Completing 9-piece cardboard  puzzle to advance visual perceptual skills; able to assemble puzzle with supervision & verbal cues for orientation        Not Completed   Grasp, handwriting, and visual motor  Copy and design task writing name on lined on paper with marker, improved size and spacing with visual boarder letters legible    Mod cueing for letter sizing  White board, dry erase markers completed   Grasp, prehension and bimanual integration  Multi fine motor task coloring,  cutting and glueing able to complete with min verbal cues adn mod a to stabilzie paper        completed   Play, visual motor skills, prehension, social skills  Turn taking game with peer 'Zingo' did well maintaining attention and able to complete turn taking with supervision    Independently engaged in turn taking   Not completed   LB dressing, grasp and prehension  LB dressing activity doffing/donning socks & shoes; able to doff & betsy shoes with supervision; max a to don socks        Not completed   Patient wrote 3 sentences with use of visual guide and MOD verbal cueing and assist to improve and practive accurate letter formation, adjust pencil grasp and attend to visual guide.   Completed   Patient completed copy/ design connect the dots form to assist in shape formation; practice drawing shapes with the presence of visual cues.   Completed   Visual motor, prehension    Identify same-design owls, use ruler to draw straight lines with crayons   Mod A to stabilize ruler    Not completed      Neuro Re-Education Grid  Intervention Assist Level Equipment Used Comments                                                                               ASSESSMENT   12/3:On this date, Patient benefited from use of visual schedule and was able to achieve 3 minutes of preferred time at end of session after completing 4 therapist chosen activities. POC and progress made discussed with mother on this date.    11/18: Patient demonstrated high energy on this date with difficulty attending to and completing seated tasks without MOD verbal and tactile cueing and movement breaks.  Patient was not able to complete preferred activity at end of session.   Letter formation, handwriting worksheets from school, and pre-handwriting skills addressing visual spatial and FM were completed on this date. Patient demonstrates fair-poor legibility and 50% accuracy with letter formation- benefitted from additional reps and visual guide for tracing  and copying.    Hand strengthening to be further addressed at next session.      11/5: Sandra transitioned well from ST to OT. Sandra engaged in multi-player game, 'Zingo', to facilitate socialization and turn taking. Sandra independently engaged in turn taking throughout activity. Sandra benefits from hand strengthening activities to improve FM skills and grasp to increase independence in functional tasks. Pulling and squeezing pink theraputty was noted to be challenging as Sandra demonstrated grimacing and verbalized difficulty. Sandra benefitted from writing on vertical large surface (I.e. white board) when focusing on letter sizing and formation with 3 lines. However, Sandra continues to require min to mod A for letter sizing. Sandra demonstrated min dysregulation throughout the session as noted by difficulty sitting in chair, thus benefitting from engaging in blowing bubbles to utilize deep pressure to improve self-regulation skills.      Sandra is a very sweet 9 year old who has been seen for weekly skilled occupational therapy due to medical diagnosis of congenital heart disease with history of prolonged hospital admissions resulting in significantly decreased strength, delayed fine motor and visual motor skills. Sandra has made significant progress across all domains this duration, specifically with fine motor tasks, improved ability to manipulate writing tools. Sandra continues to need to address hand strength and coordination in order to complete age appropriate FMC tasks. Sandra continues to require assist for dressing activities, donning socks though making progress. Sandra has made signficant pogress with peer interactions with improved initiation of requesting to play with peers and engaging in turn taking games. Sandra uses modified tripod grasp and has displayed improved ability to manipulate in proper position though fatigues quickly. Sandra met two short term goals and two long term goals, goal  areas revised and upgraded to reflect progress. Sandra would benefit from continued therapy to target increased strengthening for improved activity tolerance, increase bilateral coordination to maximize participation in dressing activities and participation in multi-step tasks to increase visual attention. At this time skilled occupational therapy is medically necessary due to medical diagnosis of congenital heart disease resulting in decreased strength, delayed fine motor skills and delayed visual motor skills.       Sandra did well transitioning into OT session. Sandra was told she could play an IPAD game for the last 3 minutes of her session if 4 therapeutic activities were completed; increased motivation and willingness to participate noted. Sandra did well engaging in seated fine motor tasks with MIN A for attention to task and standing/ movement rest breaks after every activity was completed. Patient required MIN A for turning paper to cut out simple shapes and a pumpkin and min a for proper orientation of scissors. Sandra continues to make progress with copy and design tasks writing name letters continue to be inconsistent in size and spacing though legibile. Patient wrote name and name of 5 favorite things about Halloween .  She chose to complete [facility] tasks with pretend baking toys and kitchen set for preferred time for last 6 minutes ofsession.  Sandra did well transitioning between tasks and required min verbal cues to maintain attention on tasks. At this time skilled occupational therapy is medically necessary due to medical diagnosis of congenital heart disease resulting in decreased strength, delayed fine motor skills and delayed visual motor skills.      9/10 Sandra did well transitioning into OT session. Sandra required mod a through obliques to complete transition sit to prone over bolster. Sandra continues to require min a to don socks though independent with donning and doffing shoes. Requires min a  to manipulate over her toes. Sandra is making emerging progress with cutting out simple shapes continues to have difficulty turning paper with LUE. Sandra did well without visual model this session writing short words though difficulty with size and spacing of letters. At this time skilled occupational therapy is medically necessary due to medical diagnosis of congenital heart disease resulting in decreased strength, delayed fine motor skills and delayed visual motor skills.    9/5 Sandra did well transitioning into OT session. Sandra continues to make progress with peer interactions initiating play with peer and did well with turn taking game. Sandra displayed some difficulty with visual closure task identifying incomplete animals required min verbal cues to complete. Sandra continued to make progress with copy andd esign tasks, improved ability to attempt to write on line though inconsistent. Letters with inconsistent formations though emerging legiblility. At this time skilled occupational therapy is medically necessary due to medical diagnosis of congenital heart disease resulting in decreased strength, delayed fine motor skills and delayed visual motor skills.      Home Program:      Result of above outlined education: Verbalizes understanding     PLAN_________________________________________________________________  Suggestions for next session as indicated: progress per plan of care, Ongoing OP OT is strongly recommended to address functional deficits. Increase hand/pinch strengthening activities to improve FM skills and grasp for functional tasks.     THERAPY DAILY BILLING   Insurance: 1. [employer] 2. [place] [employer]        Total Treatment Time: 45 minutes     Electronically signed by Lili Jara OT

## 2023-02-28 ENCOUNTER — HOSPITAL ENCOUNTER (OUTPATIENT)
Dept: PHYSICAL THERAPY | Age: 78
Discharge: HOME | End: 2023-02-28
Payer: MEDICARE

## 2023-02-28 PROCEDURE — 97140 MANUAL THERAPY 1/> REGIONS: CPT | Performed by: PHYSICAL THERAPIST

## 2023-02-28 PROCEDURE — 97016 VASOPNEUMATIC DEVICE THERAPY: CPT | Performed by: PHYSICAL THERAPIST

## 2023-02-28 PROCEDURE — 97110 THERAPEUTIC EXERCISES: CPT | Performed by: PHYSICAL THERAPIST

## 2023-02-28 NOTE — PROGRESS NOTES
PT DAILY TREATMENT NOTE - [facility] 2-15    Patient Name: Bill Ontiveros  Date:2023  : 1945  [x]  Patient  Verified  Payor: Sharlyne Schaumann / Plan: [place] / Product Type: Medicare /    In time: 12:30 P  Out time: 1:30 P   Total Treatment Time (min): 60  Total Timed Codes (min): 50  1/1: 40  Visit #:  6    Treatment Area: Left knee pain [M25.562]    SUBJECTIVE  Pain Level (0-10 scale): <5  Any medication changes, allergies to medications, adverse drug reactions, diagnosis change, or new procedure performed?: [x] No    [] Yes (see summary sheet for update)  Subjective functional status/changes:   [] No changes reported  \"I overdid it this weekend.   I went to the MD and he told me that I need to do a stretch on my stomach to stretch my knee\"    OBJECTIVE:  Modality rationale: decrease edema, decrease inflammation, and decrease pain to improve the patients ability to tolerate increase in activity   Min Type Additional Details     10 [x]  Vasopneumatic Device  + vasopneumatic compression to provide intermittent compression, thus reducing edema   Pressure:       [] lo [x] med [] hi   Temperature:      [x] Skin assessment post-treatment:  [x]intact []redness- no adverse reaction    []redness - adverse reaction:     30 min Therapeutic Exercise:  [x] See flow sheet :   Rationale: increase ROM, increase strength, improve balance, and increase proprioception to improve the patients ability to increase ADLs      20 min Manual Therapy:   scar mobs  Patella mobs  Seated knee joint anterior and posterior mobs  STM hamstring and gastroc muscles (omit)  Manual hamstring stretch (omit)  PROM L knee flexion   Rationale: decrease pain, increase ROM, increase tissue extensibility, and decrease edema  to improve the patients ability to tolertae incraese in ADLs          With   [x] TE   [] TA   [] Neuro   [] SC   [] other: Patient Education: [x] Review HEP    [] Progressed/Changed HEP based on:   [] positioning [] body mechanics   [] transfers   [] heat/ice application    [x] other: reviewed prone hang. Advised to try in clinic, pt wishes to try at home. Advised increasing icing in order to dec pain levels. Other Objective/Functional Measures:      Pain Level (0-10 scale) post treatment: 1    ASSESSMENT/Changes in Function:         Progress towards goals / Updated goals:       Excellent patellar mobility noted. Good scar mobility noted. PROM progressing well. PLAN  [x]  Upgrade activities as tolerated     []  Continue plan of care  []  Update interventions per flow sheet       []  Discharge due to:_  []  Other:_      Josefina Calhoun, PT 2/28/2023

## 2023-03-02 ENCOUNTER — HOSPITAL ENCOUNTER (OUTPATIENT)
Dept: PHYSICAL THERAPY | Age: 78
Discharge: HOME | End: 2023-03-02
Payer: MEDICARE

## 2023-03-02 PROCEDURE — 97110 THERAPEUTIC EXERCISES: CPT | Performed by: PHYSICAL THERAPIST

## 2023-03-02 PROCEDURE — 97016 VASOPNEUMATIC DEVICE THERAPY: CPT | Performed by: PHYSICAL THERAPIST

## 2023-03-02 PROCEDURE — 97140 MANUAL THERAPY 1/> REGIONS: CPT | Performed by: PHYSICAL THERAPIST

## 2023-03-02 NOTE — PROGRESS NOTES
PT PROGRESS NOTE - [facility] 2-15    Patient Name: Boni Prince  Date:3/2/2023  : 1945  [x]  Patient  Verified  Payor: [place] MEDICARE / Plan: [place] MEDICARE PART A & B / Product Type: Medicare /    In time: 12:00 P  Out time: 1:05 P   Total Treatment Time (min): 65  Total Timed Codes (min): 50  1 on 1 time: 50  Visit #:  8    Treatment Area: Left knee pain [M25.562]    SUBJECTIVE  Pain Level (0-10 scale): 3  Any medication changes, allergies to medications, adverse drug reactions, diagnosis change, or new procedure performed?: [x] No    [] Yes (see summary sheet for update)  Subjective functional status/changes:   [] No changes reported  \"She was rough last time. I did a lot of walking yesterday so it's more swollen. \"    OBJECTIVE:  Effusion:  Mid and supra patella +3 cm   AROM:  -4 to 95 degrees  PROM:  -1 to 107 degrees    Modality rationale: decrease edema, decrease inflammation, and decrease pain to improve the patients ability to tolerate increase in activity   Min Type Additional Details     15 [x]  Vasopneumatic Device  + vasopneumatic compression to provide intermittent compression, thus reducing edema   Pressure:       [] lo [x] med [] hi   Temperature:      [x] Skin assessment post-treatment:  [x]intact []redness- no adverse reaction    []redness - adverse reaction:     30 min Therapeutic Exercise:  [x] See flow sheet :   Rationale: increase ROM, increase strength, improve balance, and increase proprioception to improve the patients ability to increase ADLs      20 min Manual Therapy:   scar mobs  Patella mobs  Seated knee joint anterior and posterior mobs  STM hamstring and gastroc muscles (omit)  Manual hamstring stretch (omit)  PROM L knee flexion   Rationale: decrease pain, increase ROM, increase tissue extensibility, and decrease edema  to improve the patients ability to tolertae incraese in ADLs          With   [x] TE   [] TA   [] Neuro   [] SC   [] other: Patient Education: [x] Review HEP [] Progressed/Changed HEP based on:   [] positioning   [] body mechanics   [] transfers   [] heat/ice application    [x] other:      Other Objective/Functional Measures:      Pain Level (0-10 scale) post treatment: 1    ASSESSMENT/Changes in Function:  Patient progressing. ROM improved, swelling noted today. Patient will continue to benefit from skilled PT services to address functional mobility deficits, address ROM deficits, address strength deficits, and analyze and address soft tissue restrictions to attain remaining goals. [x]  See Plan of Care  []  See progress note/recertification  []  See Discharge Summary    Progress towards goals / Updated goals:       Short Term Goals:   1. Decrease effusion to +3 cm to reduce quad inhibition and increase flexion ROM. Met  2. AROM -3 to 90 degrees, PROM -1 to 100 degrees to improve functional bending. PROM and flexion AROM met.       PLAN  [x]  Upgrade activities as tolerated     [x]  Continue plan of care  []  Update interventions per flow sheet       []  Discharge due to:_  []  Other:_      Ruma Ruiz, PT 3/2/2023

## 2023-03-03 DIAGNOSIS — E78.00 HYPERCHOLESTEREMIA: ICD-10-CM

## 2023-03-06 RX ORDER — ROSUVASTATIN CALCIUM 20 MG/1
TABLET, COATED ORAL
Qty: 90 TABLET | Refills: 0 | Status: SHIPPED | OUTPATIENT
Start: 2023-03-06

## 2023-03-13 DIAGNOSIS — R73.03 PREDIABETES: ICD-10-CM

## 2023-03-13 RX ORDER — METFORMIN HYDROCHLORIDE 500 MG/1
TABLET, EXTENDED RELEASE ORAL
Qty: 180 TABLET | Refills: 1 | Status: SHIPPED | OUTPATIENT
Start: 2023-03-13

## 2023-03-14 ENCOUNTER — HOSPITAL ENCOUNTER (OUTPATIENT)
Dept: PHYSICAL THERAPY | Age: 78
Discharge: HOME | End: 2023-03-14
Payer: MEDICARE

## 2023-03-14 PROCEDURE — 97016 VASOPNEUMATIC DEVICE THERAPY: CPT | Performed by: PHYSICAL THERAPIST

## 2023-03-14 PROCEDURE — 97110 THERAPEUTIC EXERCISES: CPT | Performed by: PHYSICAL THERAPIST

## 2023-03-14 PROCEDURE — 97140 MANUAL THERAPY 1/> REGIONS: CPT | Performed by: PHYSICAL THERAPIST

## 2023-03-14 NOTE — PROGRESS NOTES
PT Daily Note - [facility] 2-15    Patient Name: Maryellen Loredo  Date:3/14/2023  : 1945  [x]  Patient  Verified  Payor: Jennifer Matute / Plan: [place] MEDICARE PART A & B / Product Type: Medicare /    In time: 12:00 P  Out time: 1:00 P   Total Treatment Time (min): 60  Total Timed Codes (min): 45  1 on 1 time: 45  Visit #:  8    Treatment Area: Left knee pain [M25.562]    SUBJECTIVE  Pain Level (0-10 scale): 0/10 at rest, increased pain medial knee with certain movements, more so with bending. Any medication changes, allergies to medications, adverse drug reactions, diagnosis change, or new procedure performed?: [x] No    [] Yes (see summary sheet for update)  Subjective functional status/changes:   [] No changes reported  See above    OBJECTIVE:      Modality rationale: decrease edema, decrease inflammation, and decrease pain to improve the patients ability to tolerate increase in activity   Min Type Additional Details     15 [x]  Vasopneumatic Device  + vasopneumatic compression to provide intermittent compression, thus reducing edema   Pressure:       [] lo [x] med [] hi   Temperature:      [x] Skin assessment post-treatment:  [x]intact []redness- no adverse reaction    []redness - adverse reaction:     30 min Therapeutic Exercise:  [x] See flow sheet : added mini squat, added seated hamstring stretch.      Rationale: increase ROM, increase strength, improve balance, and increase proprioception to improve the patients ability to increase ADLs      15 min Manual Therapy:   scar mobs  Patella mobs (in 20 deg flexion and 60 deg knee flexion)   Seated knee joint anterior and posterior mobs (performed in supine today)  STM hamstring and gastroc muscles (omit)  Manual hamstring stretch (omit)  PROM L knee flexion   Rationale: decrease pain, increase ROM, increase tissue extensibility, and decrease edema  to improve the patients ability to tolertae incraese in ADLs          With   [x] TE   [] TA   [] Neuro   [] SC [] other: Patient Education: [x] Review HEP    [] Progressed/Changed HEP based on:   [] positioning   [] body mechanics   [] transfers   [] heat/ice application    [x] other:      Other Objective/Functional Measures:      Pain Level (0-10 scale) post treatment: not captured. ASSESSMENT/Changes in Function:  Pt with increased medial knee pain today (immediately medial to patella), inc. Pain with her SLR in supine and S/L today. Improved tolerance in weight bearing position with mini squats. Patient will continue to benefit from skilled PT services to address functional mobility deficits, address ROM deficits, address strength deficits, and analyze and address soft tissue restrictions to attain remaining goals. [x]  See Plan of Care  []  See progress note/recertification  []  See Discharge Summary    Progress towards goals / Updated goals:       Short Term Goals:   1. Decrease effusion to +3 cm to reduce quad inhibition and increase flexion ROM. Met  2. AROM -3 to 90 degrees, PROM -1 to 100 degrees to improve functional bending. PROM and flexion AROM met.       PLAN  [x]  Upgrade activities as tolerated     [x]  Continue plan of care  []  Update interventions per flow sheet       []  Discharge due to:_  []  Other:_      Apollo Canseco, PT 3/14/2023

## 2023-03-16 ENCOUNTER — HOSPITAL ENCOUNTER (OUTPATIENT)
Dept: PHYSICAL THERAPY | Age: 78
Discharge: HOME | End: 2023-03-16
Payer: MEDICARE

## 2023-03-16 PROCEDURE — 97016 VASOPNEUMATIC DEVICE THERAPY: CPT | Performed by: PHYSICAL THERAPIST

## 2023-03-16 PROCEDURE — 97140 MANUAL THERAPY 1/> REGIONS: CPT | Performed by: PHYSICAL THERAPIST

## 2023-03-16 PROCEDURE — 97110 THERAPEUTIC EXERCISES: CPT | Performed by: PHYSICAL THERAPIST

## 2023-03-16 NOTE — PROGRESS NOTES
PT Daily Note - [facility] 2-15    Patient Name: Arnoldo Oliva  Date:3/16/2023  : 1945  [x]  Patient  Verified  Payor: Brendan Peacock / Plan: [place] MEDICARE PART A & B / Product Type: Medicare /    In time: 12:00 P  Out time: 1:00 P   Total Treatment Time (min): 60  Total Timed Codes (min): 45  1 on 1 time: 30  Visit #:  11    Treatment Area: Left knee pain [M25.562]    SUBJECTIVE  Pain Level (0-10 scale): 3  Any medication changes, allergies to medications, adverse drug reactions, diagnosis change, or new procedure performed?: [x] No    [] Yes (see summary sheet for update)  Subjective functional status/changes:   [] No changes reported  \"It's achy and stiff. \"    OBJECTIVE:    Modality rationale: decrease edema, decrease inflammation, and decrease pain to improve the patients ability to tolerate increase in activity   Min Type Additional Details     15 [x]  Vasopneumatic Device  + vasopneumatic compression to provide intermittent compression, thus reducing edema   Pressure:       [] lo [x] med [] hi   Temperature:      [x] Skin assessment post-treatment:  [x]intact []redness- no adverse reaction    []redness - adverse reaction:     30 min Therapeutic Exercise:  [x] See flow sheet :    Rationale: increase ROM, increase strength, improve balance, and increase proprioception to improve the patients ability to increase ADLs      15 min Manual Therapy:   scar mobs  Patella mobs   Seated knee joint anterior and posterior mobs  STM hamstring and gastroc muscles (omit)  Manual hamstring stretch (omit)  PROM L knee flexion   Rationale: decrease pain, increase ROM, increase tissue extensibility, and decrease edema  to improve the patients ability to tolertae incraese in ADLs          With   [x] TE   [] TA   [] Neuro   [] SC   [] other: Patient Education: [x] Review HEP    [] Progressed/Changed HEP based on:   [] positioning   [] body mechanics   [] transfers   [] heat/ice application    [x] other:      Other Objective/Functional Measures:      Pain Level (0-10 scale) post treatment: 1     ASSESSMENT/Changes in Function:  Patient improving. Swelling noted. Patient will continue to benefit from skilled PT services to address functional mobility deficits, address ROM deficits, address strength deficits, and analyze and address soft tissue restrictions to attain remaining goals. [x]  See Plan of Care  []  See progress note/recertification  []  See Discharge Summary    Progress towards goals / Updated goals:       Short Term Goals:   1. Decrease effusion to +3 cm to reduce quad inhibition and increase flexion ROM. Met  2. AROM -3 to 90 degrees, PROM -1 to 100 degrees to improve functional bending. PROM and flexion AROM met.       PLAN  [x]  Upgrade activities as tolerated     [x]  Continue plan of care  []  Update interventions per flow sheet       []  Discharge due to:_  []  Other:_      Petra Lucero, PT 3/16/2023

## 2023-03-21 ENCOUNTER — HOSPITAL ENCOUNTER (OUTPATIENT)
Dept: PHYSICAL THERAPY | Age: 78
Discharge: HOME | End: 2023-03-21
Payer: MEDICARE

## 2023-03-21 PROCEDURE — 97110 THERAPEUTIC EXERCISES: CPT | Performed by: PHYSICAL THERAPIST

## 2023-03-21 PROCEDURE — 97140 MANUAL THERAPY 1/> REGIONS: CPT | Performed by: PHYSICAL THERAPIST

## 2023-03-21 PROCEDURE — 97016 VASOPNEUMATIC DEVICE THERAPY: CPT | Performed by: PHYSICAL THERAPIST

## 2023-03-21 NOTE — PROGRESS NOTES
PT Daily Note - [facility] 2-15    Patient Name: Raissa Ferrara  Date:3/21/2023  : 1945  [x]  Patient  Verified  Payor: Dale Shankar / Plan: [place] MEDICARE PART A & B / Product Type: Medicare /    In time: 12:05 P  Out time: 1:05 P   Total Treatment Time (min): 60  Total Timed Codes (min): 45  1 on 1 time: 30  Visit #:  12    Treatment Area: Left knee pain [M25.562]    SUBJECTIVE  Pain Level (0-10 scale): 2  Any medication changes, allergies to medications, adverse drug reactions, diagnosis change, or new procedure performed?: [x] No    [] Yes (see summary sheet for update)  Subjective functional status/changes:   [] No changes reported  \"It's ok. \"    OBJECTIVE:  PROM 110 degrees    Modality rationale: decrease edema, decrease inflammation, and decrease pain to improve the patients ability to tolerate increase in activity   Min Type Additional Details     15 [x]  Vasopneumatic Device  + vasopneumatic compression to provide intermittent compression, thus reducing edema   Pressure:       [] lo [x] med [] hi   Temperature:      [x] Skin assessment post-treatment:  [x]intact []redness- no adverse reaction    []redness - adverse reaction:     30 min Therapeutic Exercise:  [x] See flow sheet :    Rationale: increase ROM, increase strength, improve balance, and increase proprioception to improve the patients ability to increase ADLs      15 min Manual Therapy:   scar mobs  Patella mobs   Seated knee joint anterior and posterior mobs  STM hamstring and gastroc muscles (omit)  Manual hamstring stretch (omit)  PROM L knee flexion   Rationale: decrease pain, increase ROM, increase tissue extensibility, and decrease edema  to improve the patients ability to tolertae incraese in ADLs          With   [x] TE   [] TA   [] Neuro   [] SC   [] other: Patient Education: [x] Review HEP    [] Progressed/Changed HEP based on:   [] positioning   [] body mechanics   [] transfers   [] heat/ice application    [x] other:      Other Objective/Functional Measures:      Pain Level (0-10 scale) post treatment: 1     ASSESSMENT/Changes in Function:  Flexion limited by pain and tightness. Patient will continue to benefit from skilled PT services to address functional mobility deficits, address ROM deficits, address strength deficits, and analyze and address soft tissue restrictions to attain remaining goals. [x]  See Plan of Care  []  See progress note/recertification  []  See Discharge Summary    Progress towards goals / Updated goals:       Short Term Goals:   1. Decrease effusion to +3 cm to reduce quad inhibition and increase flexion ROM. Met  2. AROM -3 to 90 degrees, PROM -1 to 100 degrees to improve functional bending. PROM and flexion AROM met.       PLAN  [x]  Upgrade activities as tolerated     [x]  Continue plan of care  []  Update interventions per flow sheet       []  Discharge due to:_  []  Other:_      Griffin Bernstein, PT 3/21/2023

## 2023-03-23 ENCOUNTER — HOSPITAL ENCOUNTER (OUTPATIENT)
Dept: PHYSICAL THERAPY | Age: 78
Discharge: HOME | End: 2023-03-23
Payer: MEDICARE

## 2023-03-23 PROCEDURE — 97140 MANUAL THERAPY 1/> REGIONS: CPT | Performed by: PHYSICAL THERAPIST

## 2023-03-23 PROCEDURE — 97110 THERAPEUTIC EXERCISES: CPT | Performed by: PHYSICAL THERAPIST

## 2023-03-23 NOTE — PROGRESS NOTES
PT Daily Note - [facility] 2-15    Patient Name: Jess Knowles  Date:3/23/2023  : 1945  [x]  Patient  Verified  Payor: [employer] / Plan: [place] MEDICARE PART A & B / Product Type: Medicare /    In time: 12:10 P  Out time: 1:05 P   Total Treatment Time (min): 55  Total Timed Codes (min): 45  1 on 1 time: 30  Visit #:  13    Treatment Area: Left knee pain [M25.562]    SUBJECTIVE  Pain Level (0-10 scale): 2  Any medication changes, allergies to medications, adverse drug reactions, diagnosis change, or new procedure performed?: [x] No    [] Yes (see summary sheet for update)  Subjective functional status/changes:   [] No changes reported  \"It's ok. \"  States not performing her exercises as much as she should. OBJECTIVE:      Modality rationale: decrease edema, decrease inflammation, and decrease pain to improve the patients ability to bend and walk.    Min Type Additional Details       [] Estim: []Att   []Unatt    []TENS instruct                  []IFC  []Premod   []NMES                     []Other:  []w/US   []w/ice   []w/heat  Position:  Location:       []  Traction: [] Cervical       []Lumbar                       [] Prone          []Supine                       []Intermittent   []Continuous Lbs:  [] before manual  [] after manual  []w/heat    []  Ultrasound: []Continuous   [] Pulsed                       at: []1MHz   []3MHz Location:  W/cm2:    [] Paraffin         Location:   []w/heat   10 [x]  Ice     []  Heat  []  Ice massage Position:  Hook lying  Location:  Left knee    []  Laser  []  Other: Position:  Location:      []  Vasopneumatic Device Pressure:       [] lo [] med [] hi   Temperature:      [x] Skin assessment post-treatment:  [x]intact []redness- no adverse reaction    []redness - adverse reaction:       30 min Therapeutic Exercise:  [x] See flow sheet :    Rationale: increase ROM, increase strength, improve balance, and increase proprioception to improve the patients ability to increase ADLs      15 min Manual Therapy:   scar mobs  Patella mobs   Seated knee joint anterior and posterior mobs  STM hamstring and gastroc muscles (omit)  Manual hamstring stretch (omit)  PROM L knee flexion   Rationale: decrease pain, increase ROM, increase tissue extensibility, and decrease edema  to improve the patients ability to tolertae incraese in ADLs          With   [x] TE   [] TA   [] Neuro   [] SC   [] other: Patient Education: [x] Review HEP    [] Progressed/Changed HEP based on:   [] positioning   [] body mechanics   [] transfers   [] heat/ice application    [x] other:      Other Objective/Functional Measures:      Pain Level (0-10 scale) post treatment: 1     ASSESSMENT/Changes in Function:  Patient progressing. Patient will continue to benefit from skilled PT services to address functional mobility deficits, address ROM deficits, address strength deficits, and analyze and address soft tissue restrictions to attain remaining goals. [x]  See Plan of Care  []  See progress note/recertification  []  See Discharge Summary    Progress towards goals / Updated goals:       Short Term Goals:   1. Decrease effusion to +3 cm to reduce quad inhibition and increase flexion ROM. Met  2. AROM -3 to 90 degrees, PROM -1 to 100 degrees to improve functional bending. PROM and flexion AROM met.       PLAN  [x]  Upgrade activities as tolerated     [x]  Continue plan of care  []  Update interventions per flow sheet       []  Discharge due to:_  []  Other:_      Paul Haddad, PT 3/23/2023

## 2023-03-28 ENCOUNTER — HOSPITAL ENCOUNTER (OUTPATIENT)
Dept: PHYSICAL THERAPY | Age: 78
Discharge: HOME | End: 2023-03-28
Payer: MEDICARE

## 2023-03-28 PROCEDURE — 97110 THERAPEUTIC EXERCISES: CPT

## 2023-03-28 PROCEDURE — 97016 VASOPNEUMATIC DEVICE THERAPY: CPT

## 2023-03-28 PROCEDURE — 97140 MANUAL THERAPY 1/> REGIONS: CPT

## 2023-03-28 NOTE — PROGRESS NOTES
PT PROGRESS SUMMARY    Patient Name: Symone Delacruz  Date:3/28/2023  : 1945  Visits: 14    Treatment Area: Left knee pain [M25.562]    SUBJECTIVE  Pain Level (0-10 scale): 3  Patient reports that she continues to have pain on the front/inside of her left knee; states \"getting better\"     OBJECTIVE:    PROM L knee:  -1 to 111 degrees    Quad set L fair to good      SLR L 4/5 w/ ext lag     Tender to palpation R ant/med knee just med to patella, infrapatellar reg     ASSESSMENT/Changes in Function:      Patient making gradual progress w/ L knee ROM, strength and functional mobility however L knee ROM & LE strength are limited and not = R; pt continues to have pain and limitation with functional mobility      Patient will continue to benefit from skilled PT services to address functional mobility deficits, address ROM deficits, address strength deficits, and analyze and address soft tissue restrictions to attain remaining goals    Progress towards goals / Updated goals:       Short Term Goals:   1. Decrease effusion to +3 cm to reduce quad inhibition and increase flexion ROM. Met  2. AROM -3 to 90 degrees, PROM -1 to 100 degrees to improve functional bending. PROM and flexion AROM met.       PLAN  [x]  Upgrade activities as tolerated     [x]  Continue plan of care  []  Update interventions per flow sheet       []  Discharge due to:_  [x]  Other: RTMD this week, recommend cont PT 1-2x/wk x3-4 weeks to work toward discharge and independent program.       Madan Montes De Oca, PT 3/28/2023

## 2023-03-28 NOTE — PROGRESS NOTES
PT Daily Note - [facility] 2-15    Patient Name: Kris Talley  Date:3/28/2023  : 1945  [x]  Patient  Verified  [place]or: [place] MED[place]ARE / Plan: [place] MEDICARE PART A & B / Product Type: Medicare /    In time: 12:00 P  Out time: 105 P   Total Treatment Time (min): 65  Total Timed Codes (min): 50  1 on 1 time: 30  Visit #:  14    Treatment Area: Left knee pain [M25.562]    SUBJECTIVE  Pain Level (0-10 scale): 3/10  Any medication changes, allergies to medications, adverse drug reactions, diagnosis change, or new procedure performed?: [x] No    [] Yes (see summary sheet for update)  Subjective functional status/changes:   [] No changes reported  Patient reports she continues to have pain on the front/inside of her knee; returns to MD tomorrow    Pt reports she had an emotional end of last week and weekend due to finding out her younger sister has lung cancer and that her older sister is going in to care for dementia     OBJECTIVE:      Modality rationale: decrease edema, decrease inflammation, and decrease pain to improve the patients ability to bend and walk.    Min Type Additional Details       [] Estim: []Att   []Unatt    []TENS instruct                  []IFC  []Premod   []NMES                     []Other:  []w/US   []w/ice   []w/heat  Position:  Location:       []  Traction: [] Cervical       []Lumbar                       [] Prone          []Supine                       []Intermittent   []Continuous Lbs:  [] before manual  [] after manual  []w/heat    []  Ultrasound: []Continuous   [] Pulsed                       at: []1MHz   []3MHz Location:  W/cm2:    [] Paraffin         Location:   []w/heat    [x]  Ice     []  Heat  []  Ice massage Position:  Hook lying  Location:  Left knee    []  Laser  []  Other: Position:  Location:   10   [x]  Vasopneumatic Device Pressure:       [] lo [x] med [] hi   Temperature: 34     [x] Skin assessment post-treatment:  [x]intact []redness- no adverse reaction    []redness - adverse reaction:       35 min Therapeutic Exercise:  [x] See flow sheet :    Rationale: increase ROM, increase strength, improve balance, and increase proprioception to improve the patients ability to increase ADLs      15 min Manual Therapy:   scar mobs  Patella mobs   Seated knee joint anterior and posterior mobs  STM hamstring and gastroc muscles (omit)  Manual hamstring stretch (omit)  PROM L knee flexion   Rationale: decrease pain, increase ROM, increase tissue extensibility, and decrease edema  to improve the patients ability to tolertae incraese in ADLs          With   [x] TE   [] TA   [] Neuro   [] SC   [] other: Patient Education: [x] Review HEP    [] Progressed/Changed HEP based on:   [] positioning   [] body mechanics   [] transfers   [] heat/ice application    [x] other: gently discussed w/ pt the potential contribution/effect that mental and/or emotional well being can have on healing potential and pain levels, advised she perhaps discuss counseling w/ her MD       Other Objective/Functional Measures:      L knee PROM 1-110 degrees     Quad set L fair to good     SLR L 4/5 w/ ext lag     Pain Level (0-10 scale) post treatment: 0/10    ASSESSMENT/Changes in Function:      Patient making gradual progress w/ L knee ROM, strength and functional mobility however L knee ROM & LE strength are limited and not = R; pt continues to have pain and limitation with functional mobility     Patient will continue to benefit from skilled PT services to address functional mobility deficits, address ROM deficits, address strength deficits, and analyze and address soft tissue restrictions to attain remaining goals. [x]  See Plan of Care  []  See progress note/recertification  []  See Discharge Summary    Progress towards goals / Updated goals:       Short Term Goals:   1. Decrease effusion to +3 cm to reduce quad inhibition and increase flexion ROM. Met  2.   AROM -3 to 90 degrees, PROM -1 to 100 degrees to improve functional bending. PROM and flexion AROM met.       PLAN  [x]  Upgrade activities as tolerated     [x]  Continue plan of care  []  Update interventions per flow sheet       []  Discharge due to:_  []  Other:_      Hugh Lopez, PT 3/28/2023

## 2023-03-29 ENCOUNTER — OFFICE VISIT (OUTPATIENT)
Dept: INTERNAL MEDICINE CLINIC | Age: 78
End: 2023-03-29
Payer: MEDICARE

## 2023-03-29 ENCOUNTER — OFFICE VISIT (OUTPATIENT)
Dept: ORTHOPEDIC SURGERY | Age: 78
End: 2023-03-29
Payer: MEDICARE

## 2023-03-29 VITALS
HEART RATE: 70 BPM | WEIGHT: 188 LBS | RESPIRATION RATE: 18 BRPM | OXYGEN SATURATION: 98 % | BODY MASS INDEX: 31.32 KG/M2 | SYSTOLIC BLOOD PRESSURE: 119 MMHG | TEMPERATURE: 97.8 F | HEIGHT: 65 IN | DIASTOLIC BLOOD PRESSURE: 85 MMHG

## 2023-03-29 VITALS — WEIGHT: 187.6 LBS | HEIGHT: 65 IN | BODY MASS INDEX: 31.25 KG/M2

## 2023-03-29 DIAGNOSIS — Z09 SURGERY FOLLOW-UP: ICD-10-CM

## 2023-03-29 DIAGNOSIS — I10 BENIGN ESSENTIAL HYPERTENSION: Primary | ICD-10-CM

## 2023-03-29 DIAGNOSIS — E66.09 CLASS 1 OBESITY DUE TO EXCESS CALORIES WITHOUT SERIOUS COMORBIDITY WITH BODY MASS INDEX (BMI) OF 31.0 TO 31.9 IN ADULT: ICD-10-CM

## 2023-03-29 DIAGNOSIS — R73.03 PREDIABETES: ICD-10-CM

## 2023-03-29 DIAGNOSIS — E78.00 HYPERCHOLESTEREMIA: ICD-10-CM

## 2023-03-29 DIAGNOSIS — Z96.652 STATUS POST TOTAL KNEE REPLACEMENT, LEFT: Primary | ICD-10-CM

## 2023-03-29 DIAGNOSIS — M17.10 ARTHRITIS OF KNEE: ICD-10-CM

## 2023-03-29 DIAGNOSIS — R41.3 MEMORY CHANGE: ICD-10-CM

## 2023-03-29 PROBLEM — E66.01 SEVERE OBESITY (HCC): Status: RESOLVED | Noted: 2018-11-19 | Resolved: 2023-03-29

## 2023-03-29 PROBLEM — M17.12 DEGENERATIVE ARTHRITIS OF LEFT KNEE: Status: RESOLVED | Noted: 2023-01-05 | Resolved: 2023-03-29

## 2023-03-29 PROCEDURE — 1101F PT FALLS ASSESS-DOCD LE1/YR: CPT | Performed by: INTERNAL MEDICINE

## 2023-03-29 PROCEDURE — 1090F PRES/ABSN URINE INCON ASSESS: CPT | Performed by: INTERNAL MEDICINE

## 2023-03-29 PROCEDURE — 3017F COLORECTAL CA SCREEN DOC REV: CPT | Performed by: INTERNAL MEDICINE

## 2023-03-29 PROCEDURE — G8399 PT W/DXA RESULTS DOCUMENT: HCPCS | Performed by: INTERNAL MEDICINE

## 2023-03-29 PROCEDURE — 99024 POSTOP FOLLOW-UP VISIT: CPT | Performed by: ORTHOPAEDIC SURGERY

## 2023-03-29 PROCEDURE — G8432 DEP SCR NOT DOC, RNG: HCPCS | Performed by: INTERNAL MEDICINE

## 2023-03-29 PROCEDURE — G8417 CALC BMI ABV UP PARAM F/U: HCPCS | Performed by: INTERNAL MEDICINE

## 2023-03-29 PROCEDURE — G8427 DOCREV CUR MEDS BY ELIG CLIN: HCPCS | Performed by: INTERNAL MEDICINE

## 2023-03-29 PROCEDURE — G8536 NO DOC ELDER MAL SCRN: HCPCS | Performed by: INTERNAL MEDICINE

## 2023-03-29 PROCEDURE — 99214 OFFICE O/P EST MOD 30 MIN: CPT | Performed by: INTERNAL MEDICINE

## 2023-03-29 PROCEDURE — G0463 HOSPITAL OUTPT CLINIC VISIT: HCPCS | Performed by: INTERNAL MEDICINE

## 2023-03-29 NOTE — ASSESSMENT & PLAN NOTE
Controlled with current regimen, plan to continue  Suggested getting home BP to monitor occasionally, also monitor for signs of hypotension if she continues losing weight we may be able to reduce dosing

## 2023-03-29 NOTE — LETTER
3/29/2023    Patient: Bill Vieyra   YOB: 1945   Date of Visit: 3/29/2023     Jeanmarie Castro, [address]    Dear Jeanmarie Castro MD,      Thank you for referring Ms. King Hernandez to [facility] for evaluation. My notes for this consultation are attached. If you have questions, please do not hesitate to call me. I look forward to following your patient along with you.       Sincerely,    Denise Navarrete MD

## 2023-03-29 NOTE — ASSESSMENT & PLAN NOTE
Wt Readings from Last 3 Encounters:   03/29/23 188 lb (85.3 kg)   03/29/23 187 lb 9.6 oz (85.1 kg)   02/23/23 194 lb 12.8 oz (88.4 kg)     Weight downtrending, she is having lower appetite since her surgery earlier in the year  No GI complaints.  Still eating 3 meals per day  Encouraged to gradually increase walking

## 2023-03-29 NOTE — PROGRESS NOTES
Kris Talley (: 1945) is a 78 y.o. female, patient, here for evaluation of the following chief complaint(s):  No chief complaint on file. SUBJECTIVE/OBJECTIVE:  Kris Talley presents today for routine follow-up approximately 11 weeks out from left primary total knee replacement. She is here to check on her clinical progress, as she was not particularly thriving when it saw her about a month ago. She relates some residual discomfort but no significant pain. Overall she feels her situation is much improved compared to a month ago. Relates significant improvement in overall mobility. Slow progress with knee range of motion. PHYSICAL EXAM:  Vitals: Ht 5' 5\" (1.651 m)   Wt 187 lb 9.6 oz (85.1 kg)   BMI 31.22 kg/m²   Body mass index is 31.22 kg/m². 78y.o. year old F, no distress. Antalgic gait on the left at start up, then gait normalizes. Left knee is in neutral alignment. Healed midline anterior scar. Minimal residual local knee swelling. No warmth. Lacks about 2 degrees of active and passive extension, flexion to approximately 110 or 115 degrees. Preoperative motion 5-120, and at her last visit motion was 5 to approximately 105. Knee is stable throughout arc of motion. IMAGING:  Radiographs: No new images today. ASSESSMENT/PLAN:  1. Status post total knee replacement, left  -     REFERRAL TO PHYSICAL THERAPY  2. Surgery follow-up    Slow steady progress. We discussed extending physical therapy to work aggressively on knee flexion, but at this point she prefers to continue on her own. We reviewed a few additional exercises to help her work on that independently. She will return in 1 month routine 1 year postop recheck, sooner if needed. No follow-ups on file. Review Of Systems     Patient denies any recent fever, chills, nausea, vomiting, chest pain, or shortness of breath.     Allergies   Allergen Reactions    Ace Inhibitors Cough Thiazides Photosensitivity       Current Outpatient Medications   Medication Sig    metFORMIN ER (GLUCOPHAGE XR) 500 mg tablet TAKE 1 TABLET BY MOUTH TWICE DAILY WITH MEALS    rosuvastatin (CRESTOR) 20 mg tablet TAKE 1 TABLET BY MOUTH EVERY NIGHT    aspirin delayed-release 81 mg tablet Take 1 Tablet by mouth two (2) times a day. Indications: blood clot prevention    acetaminophen (TYLENOL) 500 mg tablet Take 1 Tablet by mouth every four (4) hours. losartan (COZAAR) 100 mg tablet TAKE 1 TABLET BY MOUTH EVERY EVENING    amLODIPine (NORVASC) 5 mg tablet TAKE 1 TABLET BY MOUTH EVERY EVENING    multivitamin, tx-iron-ca-min (THERA-M w/ IRON) 9 mg iron-400 mcg tab tablet Take 1 Tablet by mouth daily. aspirin 81 mg cap Take 1 Tablet by mouth daily. cholecalciferol (VITAMIN D3) (2,000 UNITS /50 MCG) cap capsule Take 2,000 Units by mouth every evening. VIT C/E/ZN/COPPR/LUTEIN/ZEAXAN (PRESERVISION AREDS 2 PO) Take 1 Tablet by mouth two (2) times a day. No current facility-administered medications for this visit.        Past Medical History:   Diagnosis Date    Arrhythmia     HEART PALPITATIONS OCCASIONALLY    Arthritis     Chronic pain 2016    LEFT KNEE    Colon polyps     hyperplastic    Hypercholesterolemia     Hypertension     Nausea & vomiting     Psychiatric disorder     ANXIETY AND DEPRESSION        Past Surgical History:   Procedure Laterality Date    ENDOSCOPY, COLON, DIAGNOSTIC  08/2009    HX CAROTID ENDARTERECTOMY Right 07/01/2019    Dr. Fabiola Gilliam    HX HEENT      T&A (childhood)    HX HERNIA REPAIR      umbilical    HX KNEE REPLACEMENT Left 01/05/2023    HX ORTHOPAEDIC Left 2016    MENISCUS REPAIR    HX ORTHOPAEDIC Right     JOINT SURGERY ON MIDDLE FINGER TO REMOVE A GROWTH-PT DENIES    HX TUBAL LIGATION  1990       Family History   Problem Relation Age of Onset    Macular Degen Mother     OSTEOARTHRITIS Mother     Osteoporosis Mother     Other Mother         tremor    Cancer Mother Esophageal    Parkinsonism Father     Stroke Father         CVA (43's)    Bipolar Disorder Sister     Alzheimer's Disease Sister     No Known Problems Sister     No Known Problems Daughter     Attention Deficit Hyperactivity Disorder Daughter     No Known Problems Daughter     Anesth Problems Neg Hx         Social History     Socioeconomic History    Marital status:      Spouse name: Not on file    Number of children: Not on file    Years of education: Not on file    Highest education level: Not on file   Occupational History    Not on file   Tobacco Use    Smoking status: Never    Smokeless tobacco: Never   Vaping Use    Vaping Use: Never used   Substance and Sexual Activity    Alcohol use:  Yes     Alcohol/week: 1.0 standard drink     Types: 1 Shots of liquor per week     Comment: socially    Drug use: No    Sexual activity: Not Currently     Partners: Male   Other Topics Concern     Service Not Asked    Blood Transfusions Not Asked    Caffeine Concern Not Asked    Occupational Exposure Not Asked    [address] Hazards Not Asked    Sleep Concern Yes    Stress Concern Yes    Weight Concern Yes    Special Diet No    Back Care Not Asked    Exercise Not Asked    Bike Helmet Not Asked    Seat Belt Not Asked    Self-Exams Not Asked   Social History Narrative    Not on file     Social Determinants of Health     Financial Resource Strain: Low Risk     Difficulty of Paying Living Expenses: Not hard at all   Food Insecurity: No Food Insecurity    Worried About Running Out of Food in the Last Year: Never true    Ran Out of Food in the Last Year: Never true   Transportation Needs: Not on file   Physical Activity: Not on file   Stress: Not on file   Social Connections: Not on file   Intimate Partner Violence: Not on file   Housing Stability: Not on file       Orders Placed This Encounter    REFERRAL TO PHYSICAL THERAPY     Referral Priority:   Routine     Referral Type:   PT/OT/ST     Referral Reason:   Specialty Services Required     Number of Visits Requested:   1        An electronic signature was used to authenticate this note.   -- Emeterio Moon MD

## 2023-03-29 NOTE — PROGRESS NOTES
Chief Complaint   Patient presents with    Follow-up    Hypertension     Blood pressure 119/85, pulse 70, temperature 97.8 °F (36.6 °C), temperature source Temporal, resp. rate 18, height 5' 5\" (1.651 m), weight 188 lb (85.3 kg), SpO2 98 %.

## 2023-03-29 NOTE — ASSESSMENT & PLAN NOTE
A1C downtrended since adding metformin in the fall and she has also lost weight, no significant side effects though if losing too much weight or any signs of low glucose we could reduce dose  Continue monitoring q6mo  Lab Results   Component Value Date/Time    Hemoglobin A1c 5.4 12/19/2022 11:23 AM    Hemoglobin A1c (POC) 5.6 03/14/2017 11:51 AM

## 2023-03-29 NOTE — ASSESSMENT & PLAN NOTE
Lab Results   Component Value Date/Time    LDL, calculated 87 12/02/2022 07:05 AM    LDL, calculated 87 12/26/2019 07:27 AM     Values improved with switch to rosuvastatin, will continue

## 2023-03-29 NOTE — ASSESSMENT & PLAN NOTE
S/p left TKR about 3 months ago  Had a lot of post op pain though surgery went smoothly  Still has some aching and reduced mobility  Continues to work with PT

## 2023-03-29 NOTE — PROGRESS NOTES
3/29/2023    Rahul Ayoub 1945 is a 78y.o. year old female established patient,   here for evaluation of the following chief complaint(s):  Chief Complaint   Patient presents with    Follow-up    Hypertension           ASSESSMENT/PLAN:  Below is the assessment and plan developed based on review of pertinent history, physical exam, labs, studies, and medications. 1. Benign essential hypertension  Assessment & Plan:  Controlled with current regimen, plan to continue  Suggested getting home BP to monitor occasionally, also monitor for signs of hypotension if she continues losing weight we may be able to reduce dosing  Orders:  -     METABOLIC PANEL, COMPREHENSIVE; Future  -     MICROALBUMIN, UR, RAND W/ MICROALB/CREAT RATIO; Future  2. Arthritis of knee  Assessment & Plan:  S/p left TKR about 3 months ago  Had a lot of post op pain though surgery went smoothly  Still has some aching and reduced mobility  Continues to work with PT  3. Class 1 obesity due to excess calories without serious comorbidity with body mass index (BMI) of 31.0 to 31.9 in adult  Assessment & Plan:     Wt Readings from Last 3 Encounters:   03/29/23 188 lb (85.3 kg)   03/29/23 187 lb 9.6 oz (85.1 kg)   02/23/23 194 lb 12.8 oz (88.4 kg)     Weight downtrending, she is having lower appetite since her surgery earlier in the year  No GI complaints. Still eating 3 meals per day  Encouraged to gradually increase walking  4. Hypercholesteremia  Assessment & Plan:  Lab Results   Component Value Date/Time    LDL, calculated 87 12/02/2022 07:05 AM    LDL, calculated 87 12/26/2019 07:27 AM     Values improved with switch to rosuvastatin, will continue  Orders:  -     LIPID PANEL; Future  5.  Prediabetes  Assessment & Plan:  A1C downtrended since adding metformin in the fall and she has also lost weight, no significant side effects though if losing too much weight or any signs of low glucose we could reduce dose  Continue monitoring q6mo  Lab Results   Component Value Date/Time    Hemoglobin A1c 5.4 12/19/2022 11:23 AM    Hemoglobin A1c (POC) 5.6 03/14/2017 11:51 AM       Orders:  -     HEMOGLOBIN A1C WITH EAG; Future  6. Memory change  Assessment & Plan:   MMSE last visit was 28/30  Discussed optino to have more thorough eval with neuropsych  Orders:  -     REFERRAL TO NEUROPSYCHOLOGY         Follow-up and Dispositions    Return in about 5 months (around 9/2/2023) for medicare wellness. SUBJECTIVE/OBJECTIVE:  Hypertension  Patient reports compliance with medication as prescribed  Patient denies any side effects related to current medication. Exercise- not much lately  Denies new chest pain or shortness of breath with activity, headaches, or leg swelling. Has some concerns about memory changes, her older sister has more advanced dementia. She notes issues with word finding and also recall of new information. Review of Systems   Constitutional:  Negative for chills and fever. Respiratory:  Negative for cough and shortness of breath. Cardiovascular:  Negative for chest pain, palpitations and leg swelling. Gastrointestinal:  Negative for abdominal pain. Musculoskeletal:  Positive for joint pain (knees). Skin:  Negative for rash. Neurological:  Negative for dizziness and headaches. Physical Exam  Constitutional:       General: She is not in acute distress. Appearance: Normal appearance. She is not ill-appearing. HENT:      Head: Normocephalic and atraumatic. Eyes:      Conjunctiva/sclera: Conjunctivae normal.   Cardiovascular:      Rate and Rhythm: Normal rate and regular rhythm. Heart sounds: No murmur heard. Pulmonary:      Effort: Pulmonary effort is normal.      Breath sounds: Normal breath sounds. No wheezing. Musculoskeletal:      Right lower leg: No edema. Left lower leg: No edema. Skin:     General: Skin is warm and dry. Neurological:      General: No focal deficit present.       Mental Status: She is alert and oriented to person, place, and time. Mental status is at baseline. Psychiatric:         Mood and Affect: Mood normal.         Thought Content: Thought content normal.         Judgment: Judgment normal.        Vitals:    03/29/23 1607   BP: 119/85   Pulse: 70   Resp: 18   Temp: 97.8 °F (36.6 °C)   TempSrc: Temporal   SpO2: 98%   Weight: 188 lb (85.3 kg)   Height: 5' 5\" (1.651 m)        The following sections were reviewed & updated as appropriate: Problem List, Allergies, PMH, PSH, FH, and SH. Patient Active Problem List   Diagnosis Code    Benign essential hypertension I10    Personal history of colonic polyps Z86.010    Hypercholesteremia E78.00    Class 1 obesity due to excess calories with body mass index (BMI) of 31.0 to 31.9 in adult E66.09, Z68.31    Prediabetes R73.03    Carotid stenosis, asymptomatic, right I65.21    Chronic right-sided thoracic back pain M54.6, G89.29    Memory change R41.3    Arthritis of knee M17.10        Current Outpatient Medications   Medication Sig Dispense Refill    metFORMIN ER (GLUCOPHAGE XR) 500 mg tablet TAKE 1 TABLET BY MOUTH TWICE DAILY WITH MEALS 180 Tablet 1    rosuvastatin (CRESTOR) 20 mg tablet TAKE 1 TABLET BY MOUTH EVERY NIGHT 90 Tablet 0    aspirin delayed-release 81 mg tablet Take 1 Tablet by mouth two (2) times a day. Indications: blood clot prevention 60 Tablet 0    acetaminophen (TYLENOL) 500 mg tablet Take 1 Tablet by mouth every four (4) hours. 100 Tablet 0    losartan (COZAAR) 100 mg tablet TAKE 1 TABLET BY MOUTH EVERY EVENING 90 Tablet 1    amLODIPine (NORVASC) 5 mg tablet TAKE 1 TABLET BY MOUTH EVERY EVENING 90 Tablet 1    multivitamin, tx-iron-ca-min (THERA-M w/ IRON) 9 mg iron-400 mcg tab tablet Take 1 Tablet by mouth daily. aspirin 81 mg cap Take 1 Tablet by mouth daily. cholecalciferol (VITAMIN D3) (2,000 UNITS /50 MCG) cap capsule Take 2,000 Units by mouth every evening.       VIT C/E/ZN/COPPR/LUTEIN/ZEAXAN (PRESERVISION AREDS 2 PO) Take 1 Tablet by mouth two (2) times a day. Ace inhibitors and Thiazides    Social History     Occupational History    Not on file   Tobacco Use    Smoking status: Never    Smokeless tobacco: Never   Vaping Use    Vaping Use: Never used   Substance and Sexual Activity    Alcohol use: Yes     Alcohol/week: 1.0 standard drink     Types: 1 Shots of liquor per week     Comment: socially    Drug use: No    Sexual activity: Not Currently     Partners: Male            Disclaimer:  Aspects of this note may have been generated using Dragon voice recognition software. Despite editing, there may be some syntax errors   We discussed the expected course, resolution and complications of the diagnosis(es) in detail. I have discussed any significant medication side effects and warnings with the patient when indicated. I advised them to contact the office if their condition worsens, changes or fails to improve as anticipated. Patient expressed understanding of the diagnosis and plan. An electronic signature was used to authenticate this note.   -- Wesley Rice MD

## 2023-03-30 ENCOUNTER — APPOINTMENT (OUTPATIENT)
Dept: PHYSICAL THERAPY | Age: 78
End: 2023-03-30
Payer: MEDICARE

## 2023-04-04 LAB
ALBUMIN SERPL-MCNC: 4.6 G/DL (ref 3.7–4.7)
ALBUMIN/CREAT UR: 75 MG/G CREAT (ref 0–29)
ALBUMIN/GLOB SERPL: 1.4 {RATIO} (ref 1.2–2.2)
ALP SERPL-CCNC: 68 IU/L (ref 44–121)
ALT SERPL-CCNC: 16 IU/L (ref 0–32)
AST SERPL-CCNC: 21 IU/L (ref 0–40)
BILIRUB SERPL-MCNC: 0.4 MG/DL (ref 0–1.2)
BUN SERPL-MCNC: 14 MG/DL (ref 8–27)
BUN/CREAT SERPL: 17 (ref 12–28)
CALCIUM SERPL-MCNC: 10.4 MG/DL (ref 8.7–10.3)
CHLORIDE SERPL-SCNC: 105 MMOL/L (ref 96–106)
CHOLEST SERPL-MCNC: 154 MG/DL (ref 100–199)
CO2 SERPL-SCNC: 21 MMOL/L (ref 20–29)
CREAT SERPL-MCNC: 0.81 MG/DL (ref 0.57–1)
CREAT UR-MCNC: 263 MG/DL
EGFRCR SERPLBLD CKD-EPI 2021: 76 ML/MIN/1.73
EST. AVERAGE GLUCOSE BLD GHB EST-MCNC: 117 MG/DL
GLOBULIN SER CALC-MCNC: 3.2 G/DL (ref 1.5–4.5)
GLUCOSE SERPL-MCNC: 133 MG/DL (ref 70–99)
HBA1C MFR BLD: 5.7 % (ref 4.8–5.6)
HDLC SERPL-MCNC: 52 MG/DL
LDLC SERPL CALC-MCNC: 78 MG/DL (ref 0–99)
MICROALBUMIN UR-MCNC: 197.5 UG/ML
POTASSIUM SERPL-SCNC: 4.4 MMOL/L (ref 3.5–5.2)
PROT SERPL-MCNC: 7.8 G/DL (ref 6–8.5)
SODIUM SERPL-SCNC: 144 MMOL/L (ref 134–144)
TRIGL SERPL-MCNC: 139 MG/DL (ref 0–149)
VLDLC SERPL CALC-MCNC: 24 MG/DL (ref 5–40)

## 2023-06-12 RX ORDER — ROSUVASTATIN CALCIUM 20 MG/1
TABLET, COATED ORAL
Qty: 90 TABLET | Refills: 0 | Status: SHIPPED | OUTPATIENT
Start: 2023-06-12

## 2023-06-20 ENCOUNTER — HOSPITAL ENCOUNTER (OUTPATIENT)
Facility: HOSPITAL | Age: 78
Discharge: HOME | End: 2023-06-23
Payer: MEDICARE

## 2023-06-20 DIAGNOSIS — Z12.31 SCREENING MAMMOGRAM FOR HIGH-RISK PATIENT: ICD-10-CM

## 2023-06-20 PROCEDURE — 77067 SCR MAMMO BI INCL CAD: CPT

## 2023-06-28 DIAGNOSIS — I10 ESSENTIAL (PRIMARY) HYPERTENSION: Primary | ICD-10-CM

## 2023-06-28 RX ORDER — AMLODIPINE BESYLATE 5 MG/1
TABLET ORAL
Qty: 90 TABLET | Refills: 1 | Status: SHIPPED | OUTPATIENT
Start: 2023-06-28

## 2023-06-28 RX ORDER — LOSARTAN POTASSIUM 100 MG/1
TABLET ORAL
Qty: 90 TABLET | Refills: 1 | Status: SHIPPED | OUTPATIENT
Start: 2023-06-28

## 2023-08-04 ENCOUNTER — OFFICE VISIT (OUTPATIENT)
Age: 78
End: 2023-08-04
Payer: MEDICARE

## 2023-08-04 VITALS
OXYGEN SATURATION: 98 % | HEIGHT: 65 IN | RESPIRATION RATE: 18 BRPM | WEIGHT: 171.2 LBS | HEART RATE: 54 BPM | SYSTOLIC BLOOD PRESSURE: 124 MMHG | BODY MASS INDEX: 28.52 KG/M2 | DIASTOLIC BLOOD PRESSURE: 68 MMHG | TEMPERATURE: 97.5 F

## 2023-08-04 DIAGNOSIS — E55.9 VITAMIN D DEFICIENCY: ICD-10-CM

## 2023-08-04 DIAGNOSIS — I10 BENIGN ESSENTIAL HYPERTENSION: ICD-10-CM

## 2023-08-04 DIAGNOSIS — E66.3 OVERWEIGHT WITH BODY MASS INDEX (BMI) OF 28 TO 28.9 IN ADULT: ICD-10-CM

## 2023-08-04 DIAGNOSIS — R73.03 PREDIABETES: ICD-10-CM

## 2023-08-04 DIAGNOSIS — R19.09 UMBILICAL MASS: ICD-10-CM

## 2023-08-04 DIAGNOSIS — M17.10 ARTHRITIS OF KNEE: ICD-10-CM

## 2023-08-04 DIAGNOSIS — Z86.010 PERSONAL HISTORY OF COLONIC POLYPS: ICD-10-CM

## 2023-08-04 PROBLEM — R41.3 MEMORY CHANGE: Status: ACTIVE | Noted: 2022-06-20

## 2023-08-04 PROCEDURE — G8419 CALC BMI OUT NRM PARAM NOF/U: HCPCS | Performed by: INTERNAL MEDICINE

## 2023-08-04 PROCEDURE — 99214 OFFICE O/P EST MOD 30 MIN: CPT | Performed by: INTERNAL MEDICINE

## 2023-08-04 PROCEDURE — 1123F ACP DISCUSS/DSCN MKR DOCD: CPT | Performed by: INTERNAL MEDICINE

## 2023-08-04 PROCEDURE — G8427 DOCREV CUR MEDS BY ELIG CLIN: HCPCS | Performed by: INTERNAL MEDICINE

## 2023-08-04 PROCEDURE — 3017F COLORECTAL CA SCREEN DOC REV: CPT | Performed by: INTERNAL MEDICINE

## 2023-08-04 PROCEDURE — 1090F PRES/ABSN URINE INCON ASSESS: CPT | Performed by: INTERNAL MEDICINE

## 2023-08-04 PROCEDURE — 1036F TOBACCO NON-USER: CPT | Performed by: INTERNAL MEDICINE

## 2023-08-04 PROCEDURE — 3074F SYST BP LT 130 MM HG: CPT | Performed by: INTERNAL MEDICINE

## 2023-08-04 PROCEDURE — 3078F DIAST BP <80 MM HG: CPT | Performed by: INTERNAL MEDICINE

## 2023-08-04 PROCEDURE — G8399 PT W/DXA RESULTS DOCUMENT: HCPCS | Performed by: INTERNAL MEDICINE

## 2023-08-04 SDOH — ECONOMIC STABILITY: FOOD INSECURITY: WITHIN THE PAST 12 MONTHS, YOU WORRIED THAT YOUR FOOD WOULD RUN OUT BEFORE YOU GOT MONEY TO BUY MORE.: NEVER TRUE

## 2023-08-04 SDOH — ECONOMIC STABILITY: FOOD INSECURITY: WITHIN THE PAST 12 MONTHS, THE FOOD YOU BOUGHT JUST DIDN'T LAST AND YOU DIDN'T HAVE MONEY TO GET MORE.: NEVER TRUE

## 2023-08-04 SDOH — ECONOMIC STABILITY: HOUSING INSECURITY
IN THE LAST 12 MONTHS, WAS THERE A TIME WHEN YOU DID NOT HAVE A STEADY PLACE TO SLEEP OR SLEPT IN A SHELTER (INCLUDING NOW)?: NO

## 2023-08-04 SDOH — ECONOMIC STABILITY: INCOME INSECURITY: HOW HARD IS IT FOR YOU TO PAY FOR THE VERY BASICS LIKE FOOD, HOUSING, MEDICAL CARE, AND HEATING?: NOT HARD AT ALL

## 2023-08-04 ASSESSMENT — PATIENT HEALTH QUESTIONNAIRE - PHQ9
SUM OF ALL RESPONSES TO PHQ9 QUESTIONS 1 & 2: 0
SUM OF ALL RESPONSES TO PHQ QUESTIONS 1-9: 0
2. FEELING DOWN, DEPRESSED OR HOPELESS: 0
1. LITTLE INTEREST OR PLEASURE IN DOING THINGS: 0

## 2023-08-04 ASSESSMENT — ENCOUNTER SYMPTOMS
COUGH: 0
ABDOMINAL PAIN: 0
SHORTNESS OF BREATH: 0

## 2023-08-04 NOTE — ASSESSMENT & PLAN NOTE
Wt Readings from Last 3 Encounters:   08/04/23 171 lb 3.2 oz (77.7 kg)   03/29/23 188 lb (85.3 kg)   03/29/23 187 lb 9.6 oz (85.1 kg)     Downtrending

## 2023-08-04 NOTE — ASSESSMENT & PLAN NOTE
Hemoglobin A1C   Date Value Ref Range Status   04/03/2023 5.7 (H) 4.8 - 5.6 % Final     Comment:              Prediabetes: 5.7 - 6.4           Diabetes: >6.4           Glycemic control for adults with diabetes: <7.0         No hypoglycemia or other concerning side effect with current regimen, plan to continue  Continue watching carbs in the diet and regular exercise as able  Ongoing q6mo lab monitoring to assess control and guide further management

## 2023-08-05 LAB
25(OH)D3 SERPL-MCNC: 44.7 NG/ML (ref 30–100)
ANION GAP SERPL CALC-SCNC: 6 MMOL/L (ref 5–15)
BUN SERPL-MCNC: 21 MG/DL (ref 6–20)
BUN/CREAT SERPL: 34 (ref 12–20)
CALCIUM SERPL-MCNC: 10.1 MG/DL (ref 8.5–10.1)
CHLORIDE SERPL-SCNC: 110 MMOL/L (ref 97–108)
CO2 SERPL-SCNC: 26 MMOL/L (ref 21–32)
CREAT SERPL-MCNC: 0.61 MG/DL (ref 0.55–1.02)
EST. AVERAGE GLUCOSE BLD GHB EST-MCNC: 100 MG/DL
GLUCOSE SERPL-MCNC: 84 MG/DL (ref 65–100)
HBA1C MFR BLD: 5.1 % (ref 4–5.6)
POTASSIUM SERPL-SCNC: 4.8 MMOL/L (ref 3.5–5.1)
SODIUM SERPL-SCNC: 142 MMOL/L (ref 136–145)

## 2023-08-14 ENCOUNTER — HOSPITAL ENCOUNTER (OUTPATIENT)
Facility: HOSPITAL | Age: 78
Discharge: HOME | End: 2023-08-17
Payer: MEDICARE

## 2023-08-14 DIAGNOSIS — R19.09 UMBILICAL MASS: ICD-10-CM

## 2023-08-14 PROCEDURE — 76705 ECHO EXAM OF ABDOMEN: CPT

## 2023-09-13 ENCOUNTER — OFFICE VISIT (OUTPATIENT)
Age: 78
End: 2023-09-13
Payer: MEDICARE

## 2023-09-13 VITALS
DIASTOLIC BLOOD PRESSURE: 71 MMHG | SYSTOLIC BLOOD PRESSURE: 108 MMHG | TEMPERATURE: 97.8 F | WEIGHT: 165.8 LBS | BODY MASS INDEX: 27.63 KG/M2 | HEART RATE: 63 BPM | RESPIRATION RATE: 16 BRPM | OXYGEN SATURATION: 96 % | HEIGHT: 65 IN

## 2023-09-13 DIAGNOSIS — K43.9 VENTRAL HERNIA WITHOUT OBSTRUCTION OR GANGRENE: ICD-10-CM

## 2023-09-13 DIAGNOSIS — K42.9 UMBILICAL HERNIA WITHOUT OBSTRUCTION AND WITHOUT GANGRENE: Primary | ICD-10-CM

## 2023-09-13 PROCEDURE — 3074F SYST BP LT 130 MM HG: CPT | Performed by: SURGERY

## 2023-09-13 PROCEDURE — 1123F ACP DISCUSS/DSCN MKR DOCD: CPT | Performed by: SURGERY

## 2023-09-13 PROCEDURE — 1090F PRES/ABSN URINE INCON ASSESS: CPT | Performed by: SURGERY

## 2023-09-13 PROCEDURE — 3078F DIAST BP <80 MM HG: CPT | Performed by: SURGERY

## 2023-09-13 PROCEDURE — 99203 OFFICE O/P NEW LOW 30 MIN: CPT | Performed by: SURGERY

## 2023-09-13 PROCEDURE — 3017F COLORECTAL CA SCREEN DOC REV: CPT | Performed by: SURGERY

## 2023-09-13 PROCEDURE — 1036F TOBACCO NON-USER: CPT | Performed by: SURGERY

## 2023-09-13 PROCEDURE — G8399 PT W/DXA RESULTS DOCUMENT: HCPCS | Performed by: SURGERY

## 2023-09-13 PROCEDURE — G8427 DOCREV CUR MEDS BY ELIG CLIN: HCPCS | Performed by: SURGERY

## 2023-09-13 PROCEDURE — G8419 CALC BMI OUT NRM PARAM NOF/U: HCPCS | Performed by: SURGERY

## 2023-09-13 ASSESSMENT — PATIENT HEALTH QUESTIONNAIRE - PHQ9
SUM OF ALL RESPONSES TO PHQ QUESTIONS 1-9: 0
SUM OF ALL RESPONSES TO PHQ QUESTIONS 1-9: 0
SUM OF ALL RESPONSES TO PHQ9 QUESTIONS 1 & 2: 0
SUM OF ALL RESPONSES TO PHQ QUESTIONS 1-9: 0
2. FEELING DOWN, DEPRESSED OR HOPELESS: 0
1. LITTLE INTEREST OR PLEASURE IN DOING THINGS: 0
SUM OF ALL RESPONSES TO PHQ QUESTIONS 1-9: 0

## 2023-09-13 NOTE — PROGRESS NOTES
[facility] at [address]  Surgery History and Physical    History of Present Illness:      David Heath is a 78 y.o. female who appears to have an umbilical hernia. She has a history of a tubal ligation. Otherwise she has not really had any other abdominal surgeries. She has noticed a bulge around her umbilicus. It does cause her some mild discomfort and pain. The pain is a 2 out of 10. She has been having normal bowel movements no nausea or vomiting.     Past Medical History:   Diagnosis Date    Arrhythmia     HEART PALPITATIONS OCCASIONALLY    Arthritis     Chronic pain 2016    LEFT KNEE    Colon polyps     hyperplastic    Hypercholesterolemia     Hypertension     Nausea & vomiting     Psychiatric disorder     ANXIETY AND DEPRESSION       Past Surgical History:   Procedure Laterality Date    CAROTID ENDARTERECTOMY Right 07/01/2019    Dr. Rakesh Bergeron    COLONOSCOPY  08/2009    HEENT      T&A (childhood)    HERNIA REPAIR      umbilical    ORTHOPEDIC SURGERY Right     JOINT SURGERY ON MIDDLE FINGER TO REMOVE A GROWTH-PT DENIES    ORTHOPEDIC SURGERY Left 2016    MENISCUS REPAIR    TOTAL KNEE ARTHROPLASTY Left 01/05/2023    TUBAL LIGATION  1990         Current Outpatient Medications:     Multiple Vitamin (MULTIVITAMIN ADULT PO), Take 1 tablet by mouth daily, Disp: , Rfl:     Multiple Vitamins-Minerals (PRESERVISION AREDS 2 PO), Take 1 tablet by mouth 2 times daily, Disp: , Rfl:     losartan (COZAAR) 100 MG tablet, TAKE 1 TABLET BY MOUTH EVERY EVENING, Disp: 90 tablet, Rfl: 1    amLODIPine (NORVASC) 5 MG tablet, TAKE 1 TABLET BY MOUTH EVERY EVENING, Disp: 90 tablet, Rfl: 1    rosuvastatin (CRESTOR) 20 MG tablet, TAKE 1 TABLET BY MOUTH EVERY NIGHT, Disp: 90 tablet, Rfl: 0    Aspirin (VAZALORE) 81 MG CAPS, Take 1 tablet by mouth daily, Disp: , Rfl:     Cholecalciferol 50 MCG (2000 UT) CAPS, Take by mouth every evening, Disp: , Rfl:     metFORMIN (GLUCOPHAGE-XR) 500 MG extended release

## 2023-09-20 RX ORDER — ROSUVASTATIN CALCIUM 20 MG/1
TABLET, COATED ORAL
Qty: 90 TABLET | Refills: 3 | Status: SHIPPED | OUTPATIENT
Start: 2023-09-20

## 2023-09-24 ENCOUNTER — HOSPITAL ENCOUNTER (EMERGENCY)
Facility: HOSPITAL | Age: 78
Discharge: HOME | End: 2023-09-24
Attending: EMERGENCY MEDICINE
Payer: MEDICARE

## 2023-09-24 ENCOUNTER — APPOINTMENT (OUTPATIENT)
Facility: HOSPITAL | Age: 78
End: 2023-09-24
Payer: MEDICARE

## 2023-09-24 VITALS
TEMPERATURE: 98 F | OXYGEN SATURATION: 99 % | RESPIRATION RATE: 16 BRPM | HEART RATE: 65 BPM | DIASTOLIC BLOOD PRESSURE: 73 MMHG | SYSTOLIC BLOOD PRESSURE: 179 MMHG

## 2023-09-24 DIAGNOSIS — R07.89 CHEST WALL PAIN: Primary | ICD-10-CM

## 2023-09-24 LAB
ALBUMIN SERPL-MCNC: 3.9 G/DL (ref 3.5–5)
ALBUMIN/GLOB SERPL: 1.1 (ref 1.1–2.2)
ALP SERPL-CCNC: 59 U/L (ref 45–117)
ALT SERPL-CCNC: 67 U/L (ref 12–78)
ANION GAP SERPL CALC-SCNC: 5 MMOL/L (ref 5–15)
AST SERPL-CCNC: 124 U/L (ref 15–37)
BASOPHILS # BLD: 0.1 K/UL (ref 0–0.1)
BASOPHILS NFR BLD: 1 % (ref 0–1)
BILIRUB SERPL-MCNC: 0.8 MG/DL (ref 0.2–1)
BUN SERPL-MCNC: 18 MG/DL (ref 6–20)
BUN/CREAT SERPL: 30 (ref 12–20)
CALCIUM SERPL-MCNC: 9.5 MG/DL (ref 8.5–10.1)
CHLORIDE SERPL-SCNC: 108 MMOL/L (ref 97–108)
CO2 SERPL-SCNC: 27 MMOL/L (ref 21–32)
COMMENT:: NORMAL
CREAT SERPL-MCNC: 0.6 MG/DL (ref 0.55–1.02)
DIFFERENTIAL METHOD BLD: NORMAL
EOSINOPHIL # BLD: 0.2 K/UL (ref 0–0.4)
EOSINOPHIL NFR BLD: 3 % (ref 0–7)
ERYTHROCYTE [DISTWIDTH] IN BLOOD BY AUTOMATED COUNT: 13.9 % (ref 11.5–14.5)
GLOBULIN SER CALC-MCNC: 3.7 G/DL (ref 2–4)
GLUCOSE SERPL-MCNC: 160 MG/DL (ref 65–100)
HCT VFR BLD AUTO: 38.7 % (ref 35–47)
HGB BLD-MCNC: 12.6 G/DL (ref 11.5–16)
IMM GRANULOCYTES # BLD AUTO: 0 K/UL (ref 0–0.04)
IMM GRANULOCYTES NFR BLD AUTO: 0 % (ref 0–0.5)
LYMPHOCYTES # BLD: 1.3 K/UL (ref 0.8–3.5)
LYMPHOCYTES NFR BLD: 16 % (ref 12–49)
MCH RBC QN AUTO: 30.7 PG (ref 26–34)
MCHC RBC AUTO-ENTMCNC: 32.6 G/DL (ref 30–36.5)
MCV RBC AUTO: 94.2 FL (ref 80–99)
MONOCYTES # BLD: 0.4 K/UL (ref 0–1)
MONOCYTES NFR BLD: 5 % (ref 5–13)
NEUTS SEG # BLD: 6 K/UL (ref 1.8–8)
NEUTS SEG NFR BLD: 75 % (ref 32–75)
NRBC # BLD: 0 K/UL (ref 0–0.01)
NRBC BLD-RTO: 0 PER 100 WBC
PLATELET # BLD AUTO: 202 K/UL (ref 150–400)
PMV BLD AUTO: 11.3 FL (ref 8.9–12.9)
POTASSIUM SERPL-SCNC: 3.8 MMOL/L (ref 3.5–5.1)
PROT SERPL-MCNC: 7.6 G/DL (ref 6.4–8.2)
RBC # BLD AUTO: 4.11 M/UL (ref 3.8–5.2)
SODIUM SERPL-SCNC: 140 MMOL/L (ref 136–145)
SPECIMEN HOLD: NORMAL
TROPONIN I SERPL HS-MCNC: 5 NG/L (ref 0–51)
WBC # BLD AUTO: 7.9 K/UL (ref 3.6–11)

## 2023-09-24 PROCEDURE — 36415 COLL VENOUS BLD VENIPUNCTURE: CPT

## 2023-09-24 PROCEDURE — 96374 THER/PROPH/DIAG INJ IV PUSH: CPT

## 2023-09-24 PROCEDURE — 84484 ASSAY OF TROPONIN QUANT: CPT

## 2023-09-24 PROCEDURE — 85025 COMPLETE CBC W/AUTO DIFF WBC: CPT

## 2023-09-24 PROCEDURE — 6360000002 HC RX W HCPCS: Performed by: EMERGENCY MEDICINE

## 2023-09-24 PROCEDURE — 71046 X-RAY EXAM CHEST 2 VIEWS: CPT

## 2023-09-24 PROCEDURE — 93005 ELECTROCARDIOGRAM TRACING: CPT | Performed by: EMERGENCY MEDICINE

## 2023-09-24 PROCEDURE — 80053 COMPREHEN METABOLIC PANEL: CPT

## 2023-09-24 PROCEDURE — 99285 EMERGENCY DEPT VISIT HI MDM: CPT

## 2023-09-24 RX ORDER — KETOROLAC TROMETHAMINE 30 MG/ML
15 INJECTION, SOLUTION INTRAMUSCULAR; INTRAVENOUS ONCE
Status: COMPLETED | OUTPATIENT
Start: 2023-09-24 | End: 2023-09-24

## 2023-09-24 RX ADMIN — KETOROLAC TROMETHAMINE 15 MG: 30 INJECTION, SOLUTION INTRAMUSCULAR at 23:13

## 2023-09-24 ASSESSMENT — PAIN - FUNCTIONAL ASSESSMENT: PAIN_FUNCTIONAL_ASSESSMENT: 0-10

## 2023-09-24 ASSESSMENT — PAIN SCALES - GENERAL
PAINLEVEL_OUTOF10: 8
PAINLEVEL_OUTOF10: 8

## 2023-09-24 ASSESSMENT — PAIN DESCRIPTION - LOCATION: LOCATION: BACK;CHEST

## 2023-09-25 LAB
EKG ATRIAL RATE: 56 BPM
EKG DIAGNOSIS: NORMAL
EKG P AXIS: 50 DEGREES
EKG P-R INTERVAL: 150 MS
EKG Q-T INTERVAL: 426 MS
EKG QRS DURATION: 92 MS
EKG QTC CALCULATION (BAZETT): 411 MS
EKG R AXIS: 63 DEGREES
EKG T AXIS: 49 DEGREES
EKG VENTRICULAR RATE: 56 BPM

## 2023-09-25 NOTE — ED NOTES
9:40 PM  I have evaluated the patient as the Provider in Rapid Medical Evaluation (RME). I have reviewed her vital signs and the triage nurse assessment. I have talked with the patient and any available family and advised that I am the provider in triage and have ordered the appropriate study to initiate their work up based on the clinical presentation during my assessment. I have advised that the patient will be accommodated in the Main ED as soon as possible. I have also requested to contact the triage nurse or myself immediately if the patient experiences any changes in their condition during this brief waiting period. 78 y.o. female presents to ED with CP. Patient reports that around 7pm tonight she started with substernal CP radiating to her back. This started as she was resting in bed. She reports that this is worth with deep inspiration. Denies any history of similar symptoms, denies cardiac history. 8/10 pain.      [id number]       [place], [place]  09/24/23 [id number]

## 2023-09-25 NOTE — ED TRIAGE NOTES
She started to have back pain and chest pain as she was preparing to go to bed. She says a deep breath makes the pain worse. She denies any shortness of breath. She says she has never had pain like this before.

## 2023-09-25 NOTE — ED PROVIDER NOTES
[facility] EMERGENCY [facility]      Pt Name: Mariano Shin  MRN: [id number]  [address] [facility] [place] 1945  Date of evaluation: 9/24/2023  Provider: Riley Barksdale       Chief Complaint   Patient presents with    Chest Pain    Back Pain         HISTORY OF PRESENT ILLNESS   (Location/Symptom, Timing/Onset, Context/Setting, Quality, Duration, Modifying Factors, Severity)  Note limiting factors. 72-year-old female comes in with chest discomfort. Patient states that she was getting ready for bed when she took some of her evening medicines. She states that since she swallowed the pills she started having midsternal chest discomfort that radiated 3 straight to her back. She denied any other symptoms with it specifically there is no lightheadedness or dizziness. There is no nausea or vomiting. No shortness of breath fevers or chills. Patient denies other complaints whatsoever infection states that currently she starting to feel better. Review of External Medical Records:     Nursing Notes were reviewed. REVIEW OF SYSTEMS    (2-9 systems for level 4, 10 or more for level 5)     Review of Systems    Except as noted above the remainder of the review of systems was reviewed and negative.        PAST MEDICAL HISTORY     Past Medical History:   Diagnosis Date    Arrhythmia     HEART PALPITATIONS OCCASIONALLY    Arthritis     Chronic pain 2016    LEFT KNEE    Colon polyps     hyperplastic    Hypercholesterolemia     Hypertension     Nausea & vomiting     Psychiatric disorder     ANXIETY AND DEPRESSION         SURGICAL HISTORY       Past Surgical History:   Procedure Laterality Date    CAROTID ENDARTERECTOMY Right 07/01/2019    Dr. Nora Guevara    COLONOSCOPY  08/2009    HEENT      T&A (childhood)    HERNIA REPAIR      umbilical    ORTHOPEDIC SURGERY Right     JOINT SURGERY ON MIDDLE FINGER TO REMOVE A GROWTH-PT DENIES    ORTHOPEDIC SURGERY Left 2016    MENISCUS REPAIR

## 2023-10-02 RX ORDER — METFORMIN HYDROCHLORIDE 500 MG/1
500 TABLET, EXTENDED RELEASE ORAL 2 TIMES DAILY WITH MEALS
Qty: 180 TABLET | Refills: 3 | Status: SHIPPED | OUTPATIENT
Start: 2023-10-02

## 2023-10-11 ENCOUNTER — HOSPITAL ENCOUNTER (OUTPATIENT)
Facility: HOSPITAL | Age: 78
Discharge: HOME | End: 2023-10-14
Attending: SURGERY
Payer: MEDICARE

## 2023-10-11 DIAGNOSIS — K43.9 VENTRAL HERNIA WITHOUT OBSTRUCTION OR GANGRENE: ICD-10-CM

## 2023-10-11 DIAGNOSIS — K42.9 UMBILICAL HERNIA WITHOUT OBSTRUCTION AND WITHOUT GANGRENE: ICD-10-CM

## 2023-10-11 PROCEDURE — 6360000004 HC RX CONTRAST MEDICATION: Performed by: STUDENT IN AN ORGANIZED HEALTH CARE EDUCATION/TRAINING PROGRAM

## 2023-10-11 PROCEDURE — 74177 CT ABD & PELVIS W/CONTRAST: CPT

## 2023-10-11 RX ADMIN — IOPAMIDOL 100 ML: 755 INJECTION, SOLUTION INTRAVENOUS at 16:03

## 2023-10-16 ENCOUNTER — TELEPHONE (OUTPATIENT)
Age: 78
End: 2023-10-16

## 2023-10-16 NOTE — TELEPHONE ENCOUNTER
I called the pt about the her CT scan. Her CT scan shows a 5 cm wide by about 4.5 cm tall hernia at the umbilicus which is more or less like 2 small defects combined and 1. She appears to have fatty tissue going into the hernia defect without any bowel. I called her and at the current moment she wants to hold off on surgery since she has other things going on currently that she needs to attend to. She understands that she will likely need to get this fixed in the future. As long as she is not having increasing pain distention enlargement of the hernia she will hold off on it for the immediate future. She will likely come back and see me in a few months to a year or so to check on it. I would likely do a robotic ETEP retrorectus repair for the defect.       Jovan Alarcon

## 2023-11-27 ENCOUNTER — APPOINTMENT (OUTPATIENT)
Facility: HOSPITAL | Age: 78
DRG: 522 | End: 2023-11-27
Payer: MEDICARE

## 2023-11-27 ENCOUNTER — HOSPITAL ENCOUNTER (INPATIENT)
Facility: HOSPITAL | Age: 78
LOS: 2 days | Discharge: HOME HEALTH | DRG: 522 | End: 2023-11-29
Attending: EMERGENCY MEDICINE | Admitting: HOSPITALIST
Payer: MEDICARE

## 2023-11-27 DIAGNOSIS — S72.001A CLOSED DISPLACED FRACTURE OF RIGHT FEMORAL NECK (HCC): ICD-10-CM

## 2023-11-27 DIAGNOSIS — S72.001A CLOSED FRACTURE OF RIGHT HIP, INITIAL ENCOUNTER (HCC): Primary | ICD-10-CM

## 2023-11-27 LAB
ABO + RH BLD: NORMAL
ALBUMIN SERPL-MCNC: 4 G/DL (ref 3.5–5)
ALBUMIN/GLOB SERPL: 1.2 (ref 1.1–2.2)
ALP SERPL-CCNC: 56 U/L (ref 45–117)
ALT SERPL-CCNC: 23 U/L (ref 12–78)
ANION GAP SERPL CALC-SCNC: 3 MMOL/L (ref 5–15)
AST SERPL-CCNC: 17 U/L (ref 15–37)
BASOPHILS # BLD: 0 K/UL (ref 0–0.1)
BASOPHILS NFR BLD: 1 % (ref 0–1)
BILIRUB SERPL-MCNC: 0.8 MG/DL (ref 0.2–1)
BLOOD GROUP ANTIBODIES SERPL: NORMAL
BUN SERPL-MCNC: 20 MG/DL (ref 6–20)
BUN/CREAT SERPL: 29 (ref 12–20)
CALCIUM SERPL-MCNC: 9.8 MG/DL (ref 8.5–10.1)
CHLORIDE SERPL-SCNC: 110 MMOL/L (ref 97–108)
CO2 SERPL-SCNC: 26 MMOL/L (ref 21–32)
COMMENT:: NORMAL
CREAT SERPL-MCNC: 0.7 MG/DL (ref 0.55–1.02)
DIFFERENTIAL METHOD BLD: ABNORMAL
EKG ATRIAL RATE: 76 BPM
EKG DIAGNOSIS: NORMAL
EKG P AXIS: 45 DEGREES
EKG P-R INTERVAL: 144 MS
EKG Q-T INTERVAL: 404 MS
EKG QRS DURATION: 88 MS
EKG QTC CALCULATION (BAZETT): 454 MS
EKG R AXIS: 55 DEGREES
EKG T AXIS: 29 DEGREES
EKG VENTRICULAR RATE: 76 BPM
EOSINOPHIL # BLD: 0.2 K/UL (ref 0–0.4)
EOSINOPHIL NFR BLD: 3 % (ref 0–7)
ERYTHROCYTE [DISTWIDTH] IN BLOOD BY AUTOMATED COUNT: 13.5 % (ref 11.5–14.5)
GLOBULIN SER CALC-MCNC: 3.4 G/DL (ref 2–4)
GLUCOSE SERPL-MCNC: 138 MG/DL (ref 65–100)
HCT VFR BLD AUTO: 36.2 % (ref 35–47)
HGB BLD-MCNC: 12 G/DL (ref 11.5–16)
IMM GRANULOCYTES # BLD AUTO: 0 K/UL (ref 0–0.04)
IMM GRANULOCYTES NFR BLD AUTO: 1 % (ref 0–0.5)
LYMPHOCYTES # BLD: 2.5 K/UL (ref 0.8–3.5)
LYMPHOCYTES NFR BLD: 39 % (ref 12–49)
MCH RBC QN AUTO: 31.2 PG (ref 26–34)
MCHC RBC AUTO-ENTMCNC: 33.1 G/DL (ref 30–36.5)
MCV RBC AUTO: 94 FL (ref 80–99)
MONOCYTES # BLD: 0.4 K/UL (ref 0–1)
MONOCYTES NFR BLD: 6 % (ref 5–13)
NEUTS SEG # BLD: 3.2 K/UL (ref 1.8–8)
NEUTS SEG NFR BLD: 50 % (ref 32–75)
NRBC # BLD: 0 K/UL (ref 0–0.01)
NRBC BLD-RTO: 0 PER 100 WBC
PLATELET # BLD AUTO: 174 K/UL (ref 150–400)
PMV BLD AUTO: 11.7 FL (ref 8.9–12.9)
POTASSIUM SERPL-SCNC: 3.9 MMOL/L (ref 3.5–5.1)
PROT SERPL-MCNC: 7.4 G/DL (ref 6.4–8.2)
RBC # BLD AUTO: 3.85 M/UL (ref 3.8–5.2)
SODIUM SERPL-SCNC: 139 MMOL/L (ref 136–145)
SPECIMEN EXP DATE BLD: NORMAL
SPECIMEN HOLD: NORMAL
WBC # BLD AUTO: 6.4 K/UL (ref 3.6–11)

## 2023-11-27 PROCEDURE — 70450 CT HEAD/BRAIN W/O DYE: CPT

## 2023-11-27 PROCEDURE — 6360000002 HC RX W HCPCS: Performed by: EMERGENCY MEDICINE

## 2023-11-27 PROCEDURE — 85025 COMPLETE CBC W/AUTO DIFF WBC: CPT

## 2023-11-27 PROCEDURE — 6370000000 HC RX 637 (ALT 250 FOR IP): Performed by: NURSE PRACTITIONER

## 2023-11-27 PROCEDURE — 86900 BLOOD TYPING SEROLOGIC ABO: CPT

## 2023-11-27 PROCEDURE — 80053 COMPREHEN METABOLIC PANEL: CPT

## 2023-11-27 PROCEDURE — 1100000000 HC RM PRIVATE

## 2023-11-27 PROCEDURE — 71045 X-RAY EXAM CHEST 1 VIEW: CPT

## 2023-11-27 PROCEDURE — 93005 ELECTROCARDIOGRAM TRACING: CPT | Performed by: EMERGENCY MEDICINE

## 2023-11-27 PROCEDURE — 6370000000 HC RX 637 (ALT 250 FOR IP): Performed by: PHYSICIAN ASSISTANT

## 2023-11-27 PROCEDURE — 86850 RBC ANTIBODY SCREEN: CPT

## 2023-11-27 PROCEDURE — 96375 TX/PRO/DX INJ NEW DRUG ADDON: CPT

## 2023-11-27 PROCEDURE — 99285 EMERGENCY DEPT VISIT HI MDM: CPT

## 2023-11-27 PROCEDURE — 2580000003 HC RX 258: Performed by: NURSE PRACTITIONER

## 2023-11-27 PROCEDURE — 96374 THER/PROPH/DIAG INJ IV PUSH: CPT

## 2023-11-27 PROCEDURE — 6360000002 HC RX W HCPCS: Performed by: PHYSICIAN ASSISTANT

## 2023-11-27 PROCEDURE — 72170 X-RAY EXAM OF PELVIS: CPT

## 2023-11-27 PROCEDURE — 36415 COLL VENOUS BLD VENIPUNCTURE: CPT

## 2023-11-27 PROCEDURE — 6360000002 HC RX W HCPCS: Performed by: NURSE PRACTITIONER

## 2023-11-27 PROCEDURE — 86901 BLOOD TYPING SEROLOGIC RH(D): CPT

## 2023-11-27 PROCEDURE — 96376 TX/PRO/DX INJ SAME DRUG ADON: CPT

## 2023-11-27 PROCEDURE — 99222 1ST HOSP IP/OBS MODERATE 55: CPT | Performed by: PHYSICIAN ASSISTANT

## 2023-11-27 PROCEDURE — 72192 CT PELVIS W/O DYE: CPT

## 2023-11-27 PROCEDURE — 73502 X-RAY EXAM HIP UNI 2-3 VIEWS: CPT

## 2023-11-27 RX ORDER — AMLODIPINE BESYLATE 5 MG/1
5 TABLET ORAL NIGHTLY
Status: DISCONTINUED | OUTPATIENT
Start: 2023-11-27 | End: 2023-11-29 | Stop reason: HOSPADM

## 2023-11-27 RX ORDER — FENTANYL CITRATE 50 UG/ML
25 INJECTION, SOLUTION INTRAMUSCULAR; INTRAVENOUS
Status: DISCONTINUED | OUTPATIENT
Start: 2023-11-27 | End: 2023-11-27

## 2023-11-27 RX ORDER — SODIUM CHLORIDE 9 MG/ML
INJECTION, SOLUTION INTRAVENOUS PRN
Status: DISCONTINUED | OUTPATIENT
Start: 2023-11-27 | End: 2023-11-28

## 2023-11-27 RX ORDER — LOSARTAN POTASSIUM 50 MG/1
100 TABLET ORAL NIGHTLY
Status: DISCONTINUED | OUTPATIENT
Start: 2023-11-27 | End: 2023-11-29 | Stop reason: HOSPADM

## 2023-11-27 RX ORDER — ONDANSETRON 2 MG/ML
4 INJECTION INTRAMUSCULAR; INTRAVENOUS ONCE
Status: COMPLETED | OUTPATIENT
Start: 2023-11-27 | End: 2023-11-27

## 2023-11-27 RX ORDER — OXYCODONE HYDROCHLORIDE 5 MG/1
10 TABLET ORAL
Status: DISCONTINUED | OUTPATIENT
Start: 2023-11-27 | End: 2023-11-28

## 2023-11-27 RX ORDER — FENTANYL CITRATE 50 UG/ML
50 INJECTION, SOLUTION INTRAMUSCULAR; INTRAVENOUS ONCE
Status: COMPLETED | OUTPATIENT
Start: 2023-11-27 | End: 2023-11-27

## 2023-11-27 RX ORDER — ONDANSETRON 2 MG/ML
4 INJECTION INTRAMUSCULAR; INTRAVENOUS EVERY 6 HOURS PRN
Status: DISCONTINUED | OUTPATIENT
Start: 2023-11-27 | End: 2023-11-29 | Stop reason: HOSPADM

## 2023-11-27 RX ORDER — POLYETHYLENE GLYCOL 3350 17 G/17G
17 POWDER, FOR SOLUTION ORAL DAILY PRN
Status: DISCONTINUED | OUTPATIENT
Start: 2023-11-27 | End: 2023-11-29 | Stop reason: HOSPADM

## 2023-11-27 RX ORDER — ACETAMINOPHEN 325 MG/1
650 TABLET ORAL EVERY 6 HOURS PRN
Status: DISCONTINUED | OUTPATIENT
Start: 2023-11-27 | End: 2023-11-28

## 2023-11-27 RX ORDER — OXYCODONE HYDROCHLORIDE 5 MG/1
5 TABLET ORAL EVERY 4 HOURS PRN
Status: DISCONTINUED | OUTPATIENT
Start: 2023-11-27 | End: 2023-11-27

## 2023-11-27 RX ORDER — HYDROMORPHONE HYDROCHLORIDE 1 MG/ML
1 INJECTION, SOLUTION INTRAMUSCULAR; INTRAVENOUS; SUBCUTANEOUS ONCE
Status: COMPLETED | OUTPATIENT
Start: 2023-11-27 | End: 2023-11-27

## 2023-11-27 RX ORDER — SODIUM CHLORIDE 0.9 % (FLUSH) 0.9 %
5-40 SYRINGE (ML) INJECTION PRN
Status: DISCONTINUED | OUTPATIENT
Start: 2023-11-27 | End: 2023-11-28

## 2023-11-27 RX ORDER — ONDANSETRON 2 MG/ML
4 INJECTION INTRAMUSCULAR; INTRAVENOUS EVERY 6 HOURS PRN
Status: DISCONTINUED | OUTPATIENT
Start: 2023-11-27 | End: 2023-11-27

## 2023-11-27 RX ORDER — SODIUM CHLORIDE 9 MG/ML
INJECTION, SOLUTION INTRAVENOUS CONTINUOUS
Status: DISCONTINUED | OUTPATIENT
Start: 2023-11-27 | End: 2023-11-28

## 2023-11-27 RX ORDER — FENTANYL CITRATE 50 UG/ML
25 INJECTION, SOLUTION INTRAMUSCULAR; INTRAVENOUS
Status: DISCONTINUED | OUTPATIENT
Start: 2023-11-27 | End: 2023-11-28

## 2023-11-27 RX ORDER — ROSUVASTATIN CALCIUM 10 MG/1
20 TABLET, COATED ORAL NIGHTLY
Status: DISCONTINUED | OUTPATIENT
Start: 2023-11-27 | End: 2023-11-29 | Stop reason: HOSPADM

## 2023-11-27 RX ORDER — SODIUM CHLORIDE 0.9 % (FLUSH) 0.9 %
5-40 SYRINGE (ML) INJECTION EVERY 12 HOURS SCHEDULED
Status: DISCONTINUED | OUTPATIENT
Start: 2023-11-27 | End: 2023-11-28

## 2023-11-27 RX ORDER — OXYCODONE HYDROCHLORIDE 5 MG/1
5 TABLET ORAL
Status: DISCONTINUED | OUTPATIENT
Start: 2023-11-27 | End: 2023-11-28

## 2023-11-27 RX ORDER — ACETAMINOPHEN 650 MG/1
650 SUPPOSITORY RECTAL EVERY 6 HOURS PRN
Status: DISCONTINUED | OUTPATIENT
Start: 2023-11-27 | End: 2023-11-28

## 2023-11-27 RX ORDER — ONDANSETRON 4 MG/1
4 TABLET, ORALLY DISINTEGRATING ORAL EVERY 8 HOURS PRN
Status: DISCONTINUED | OUTPATIENT
Start: 2023-11-27 | End: 2023-11-29 | Stop reason: HOSPADM

## 2023-11-27 RX ADMIN — FENTANYL CITRATE 50 MCG: 50 INJECTION, SOLUTION INTRAMUSCULAR; INTRAVENOUS at 16:57

## 2023-11-27 RX ADMIN — HYDROMORPHONE HYDROCHLORIDE 1 MG: 1 INJECTION, SOLUTION INTRAMUSCULAR; INTRAVENOUS; SUBCUTANEOUS at 12:29

## 2023-11-27 RX ADMIN — FENTANYL CITRATE 25 MCG: 50 INJECTION, SOLUTION INTRAMUSCULAR; INTRAVENOUS at 15:02

## 2023-11-27 RX ADMIN — ROSUVASTATIN 20 MG: 10 TABLET, FILM COATED ORAL at 21:16

## 2023-11-27 RX ADMIN — LOSARTAN POTASSIUM 100 MG: 50 TABLET, FILM COATED ORAL at 21:16

## 2023-11-27 RX ADMIN — OXYCODONE HYDROCHLORIDE 10 MG: 5 TABLET ORAL at 20:11

## 2023-11-27 RX ADMIN — AMLODIPINE BESYLATE 5 MG: 5 TABLET ORAL at 21:16

## 2023-11-27 RX ADMIN — ONDANSETRON HYDROCHLORIDE 4 MG: 2 INJECTION, SOLUTION INTRAMUSCULAR; INTRAVENOUS at 12:30

## 2023-11-27 RX ADMIN — HYDROMORPHONE HYDROCHLORIDE 1 MG: 1 INJECTION, SOLUTION INTRAMUSCULAR; INTRAVENOUS; SUBCUTANEOUS at 11:27

## 2023-11-27 RX ADMIN — FENTANYL CITRATE 50 MCG: 50 INJECTION, SOLUTION INTRAMUSCULAR; INTRAVENOUS at 12:57

## 2023-11-27 RX ADMIN — ONDANSETRON 4 MG: 2 INJECTION INTRAMUSCULAR; INTRAVENOUS at 11:27

## 2023-11-27 RX ADMIN — OXYCODONE HYDROCHLORIDE 5 MG: 5 TABLET ORAL at 16:45

## 2023-11-27 RX ADMIN — FENTANYL CITRATE 25 MCG: 50 INJECTION, SOLUTION INTRAMUSCULAR; INTRAVENOUS at 18:20

## 2023-11-27 RX ADMIN — SODIUM CHLORIDE: 9 INJECTION, SOLUTION INTRAVENOUS at 15:00

## 2023-11-27 RX ADMIN — SODIUM CHLORIDE, PRESERVATIVE FREE 10 ML: 5 INJECTION INTRAVENOUS at 21:16

## 2023-11-27 ASSESSMENT — PAIN - FUNCTIONAL ASSESSMENT
PAIN_FUNCTIONAL_ASSESSMENT: INTOLERABLE, UNABLE TO DO ANY ACTIVE OR PASSIVE ACTIVITIES
PAIN_FUNCTIONAL_ASSESSMENT: INTOLERABLE, UNABLE TO DO ANY ACTIVE OR PASSIVE ACTIVITIES
PAIN_FUNCTIONAL_ASSESSMENT: ACTIVITIES ARE NOT PREVENTED
PAIN_FUNCTIONAL_ASSESSMENT: INTOLERABLE, UNABLE TO DO ANY ACTIVE OR PASSIVE ACTIVITIES
PAIN_FUNCTIONAL_ASSESSMENT: INTOLERABLE, UNABLE TO DO ANY ACTIVE OR PASSIVE ACTIVITIES
PAIN_FUNCTIONAL_ASSESSMENT: 0-10
PAIN_FUNCTIONAL_ASSESSMENT: INTOLERABLE, UNABLE TO DO ANY ACTIVE OR PASSIVE ACTIVITIES

## 2023-11-27 ASSESSMENT — PAIN SCALES - GENERAL
PAINLEVEL_OUTOF10: 8
PAINLEVEL_OUTOF10: 10
PAINLEVEL_OUTOF10: 9
PAINLEVEL_OUTOF10: 8
PAINLEVEL_OUTOF10: 10
PAINLEVEL_OUTOF10: 10

## 2023-11-27 ASSESSMENT — PAIN DESCRIPTION - ORIENTATION
ORIENTATION: RIGHT

## 2023-11-27 ASSESSMENT — PAIN DESCRIPTION - DESCRIPTORS
DESCRIPTORS: ACHING
DESCRIPTORS: JABBING
DESCRIPTORS: GNAWING;THROBBING
DESCRIPTORS: ACHING;DISCOMFORT
DESCRIPTORS: ACHING

## 2023-11-27 ASSESSMENT — PAIN DESCRIPTION - LOCATION
LOCATION: HIP

## 2023-11-27 ASSESSMENT — PAIN DESCRIPTION - PAIN TYPE: TYPE: ACUTE PAIN

## 2023-11-27 NOTE — ED NOTES
Received verbal order for 1x dose 50mcg fentanyl for breakthrough pain after adjusting pt in bed.       Karolyn Smith RN  11/27/23 7515

## 2023-11-27 NOTE — H&P
History and Physical    Date of Service:  11/27/2023  Primary Care Provider: Gee Ro MD  Source of information: The patient and Chart review    Chief Complaint: Fall and Hip Pain      History of Presenting Illness:   Julee Klein is a 70 y.o. female who presents with right hip pain after falling on her right hip. She denies hitting her head or any +LOC. Ms. Nadege Claros has a past medical history of hypertension, anxiety/depression, hyperlipidemia, arthritis and Type II DM. CT scan in the ER shows acute segmental right femoral neck fracture with significant angulation and impacted; Acute impacted right symphyseal fracture. The patient  is in severe pain at the time of my exam. She has + n/v. She currently denies any headache, blurry vision, sore throat, trouble swallowing, trouble with speech, chest pain, SOB, cough, fever, abd pain, urinary symptoms, constipation, recent travels, sick contacts, focal or generalized neurological symptoms, falls, injuries, rashes, contact with COVID-19 diagnosed patients, hematemesis, melena, hemoptysis, hematuria, rashes, denies starting any new medications and denies any other concerns or problems besides as mentioned above. REVIEW OF SYSTEMS:  A comprehensive review of systems was negative except for that written in the History of Present Illness.      Past Medical History:   Diagnosis Date    Arrhythmia     HEART PALPITATIONS OCCASIONALLY    Arthritis     Chronic pain 2016    LEFT KNEE    Colon polyps     hyperplastic    Hypercholesterolemia     Hypertension     Nausea & vomiting     Psychiatric disorder     ANXIETY AND DEPRESSION      Past Surgical History:   Procedure Laterality Date    CAROTID ENDARTERECTOMY Right 07/01/2019    Dr. Antoni Alonso    COLONOSCOPY  08/2009    HEENT      T&A (childhood)    HERNIA REPAIR      umbilical    ORTHOPEDIC SURGERY Right     JOINT SURGERY ON MIDDLE FINGER TO REMOVE A GROWTH-PT DENIES    ORTHOPEDIC SURGERY

## 2023-11-27 NOTE — ED NOTES
TRANSFER - OUT REPORT:    Verbal report given to Meri Barnes  being transferred to [facility]  for routine progression of patient care       Report consisted of patient's Situation, Background, Assessment and   Recommendations(SBAR). Information from the following report(s) Nurse Handoff Report, Index, and ED Encounter Summary was reviewed with the receiving nurse. [place] Fall Assessment:    Presents to emergency department  because of falls (Syncope, seizure, or loss of consciousness): Yes  Age > 79: Yes  Altered Mental Status, Intoxication with alcohol or substance confusion (Disorientation, impaired judgment, poor safety awaremess, or inability to follow instructions): No  Impaired Mobility: Ambulates or transfers with assistive devices or assistance; Unable to ambulate or transer.: Yes  Nursing Judgement: Yes          Lines:   Peripheral IV 11/27/23 Left Antecubital (Active)        Opportunity for questions and clarification was provided.       Patient transported with:  O2 @ 2lpm and Registered Nurse           Fausto Brady RN  11/27/23 4552

## 2023-11-27 NOTE — ED PROVIDER NOTES
[facility] ENCOUNTER      Patient Name: Aidee Thompson  MRN: [id number]  [address] [facility] [place] 1945  Date of Evaluation: 11/27/2023  Physician: Becca Copeland MD    CHIEF COMPLAINT       Chief Complaint   Patient presents with    Fall    Hip Pain       HISTORY OF PRESENT ILLNESS   (Location/Symptom, Timing/Onset, Context/Setting, Quality, Duration, Modifying Factors, Severity)   Aidee Thompson, 70 y.o., female     68-year-old female presents with right hip pain after fall. Patient with leaning over to pick something up when she fell and landed on her right hip. She denies hitting her head or loss of conscious. Nursing Notes were reviewed. REVIEW OF SYSTEMS    (Not required)   Review of Systems   Musculoskeletal:  Positive for arthralgias.        PAST MEDICAL HISTORY     Past Medical History:   Diagnosis Date    Arrhythmia     HEART PALPITATIONS OCCASIONALLY    Arthritis     Chronic pain 2016    LEFT KNEE    Colon polyps     hyperplastic    Hypercholesterolemia     Hypertension     Nausea & vomiting     Psychiatric disorder     ANXIETY AND DEPRESSION       SURGICAL HISTORY       Past Surgical History:   Procedure Laterality Date    CAROTID ENDARTERECTOMY Right 07/01/2019    Dr. Elvira Perez    COLONOSCOPY  08/2009    HEENT      T&A (childhood)    HERNIA REPAIR      umbilical    ORTHOPEDIC SURGERY Right     JOINT SURGERY ON MIDDLE FINGER TO REMOVE A GROWTH-PT DENIES    ORTHOPEDIC SURGERY Left 2016    MENISCUS REPAIR    TOTAL KNEE ARTHROPLASTY Left 01/05/2023    TUBAL LIGATION  1990       CURRENT MEDICATIONS       Current Discharge Medication List        CONTINUE these medications which have NOT CHANGED    Details   metFORMIN (GLUCOPHAGE-XR) 500 MG extended release tablet TAKE 1 TABLET BY MOUTH TWICE DAILY WITH MEALS  Qty: 180 tablet, Refills: 3      rosuvastatin (CRESTOR) 20 MG tablet TAKE 1 TABLET BY MOUTH EVERY NIGHT  Qty: 90 tablet, Refills: 3      Multiple Vitamins-Minerals

## 2023-11-28 ENCOUNTER — ANESTHESIA (OUTPATIENT)
Facility: HOSPITAL | Age: 78
DRG: 522 | End: 2023-11-28
Payer: MEDICARE

## 2023-11-28 ENCOUNTER — ANESTHESIA EVENT (OUTPATIENT)
Facility: HOSPITAL | Age: 78
DRG: 522 | End: 2023-11-28
Payer: MEDICARE

## 2023-11-28 ENCOUNTER — APPOINTMENT (OUTPATIENT)
Facility: HOSPITAL | Age: 78
DRG: 522 | End: 2023-11-28
Payer: MEDICARE

## 2023-11-28 LAB
GLUCOSE BLD STRIP.AUTO-MCNC: 102 MG/DL (ref 65–117)
SERVICE CMNT-IMP: NORMAL

## 2023-11-28 PROCEDURE — 6360000002 HC RX W HCPCS: Performed by: NURSE ANESTHETIST, CERTIFIED REGISTERED

## 2023-11-28 PROCEDURE — 3600000015 HC SURGERY LEVEL 5 ADDTL 15MIN: Performed by: ORTHOPAEDIC SURGERY

## 2023-11-28 PROCEDURE — 3700000000 HC ANESTHESIA ATTENDED CARE: Performed by: ORTHOPAEDIC SURGERY

## 2023-11-28 PROCEDURE — 82962 GLUCOSE BLOOD TEST: CPT

## 2023-11-28 PROCEDURE — 0SR90JA REPLACEMENT OF RIGHT HIP JOINT WITH SYNTHETIC SUBSTITUTE, UNCEMENTED, OPEN APPROACH: ICD-10-PCS | Performed by: ORTHOPAEDIC SURGERY

## 2023-11-28 PROCEDURE — 1100000000 HC RM PRIVATE

## 2023-11-28 PROCEDURE — 6370000000 HC RX 637 (ALT 250 FOR IP): Performed by: ORTHOPAEDIC SURGERY

## 2023-11-28 PROCEDURE — 2709999900 HC NON-CHARGEABLE SUPPLY: Performed by: ORTHOPAEDIC SURGERY

## 2023-11-28 PROCEDURE — 6370000000 HC RX 637 (ALT 250 FOR IP): Performed by: NURSE PRACTITIONER

## 2023-11-28 PROCEDURE — 6360000002 HC RX W HCPCS: Performed by: ORTHOPAEDIC SURGERY

## 2023-11-28 PROCEDURE — 6360000002 HC RX W HCPCS: Performed by: PHYSICIAN ASSISTANT

## 2023-11-28 PROCEDURE — 2580000003 HC RX 258: Performed by: NURSE ANESTHETIST, CERTIFIED REGISTERED

## 2023-11-28 PROCEDURE — C1776 JOINT DEVICE (IMPLANTABLE): HCPCS | Performed by: ORTHOPAEDIC SURGERY

## 2023-11-28 PROCEDURE — 7100000000 HC PACU RECOVERY - FIRST 15 MIN: Performed by: ORTHOPAEDIC SURGERY

## 2023-11-28 PROCEDURE — 7100000001 HC PACU RECOVERY - ADDTL 15 MIN: Performed by: ORTHOPAEDIC SURGERY

## 2023-11-28 PROCEDURE — 2500000003 HC RX 250 WO HCPCS: Performed by: NURSE ANESTHETIST, CERTIFIED REGISTERED

## 2023-11-28 PROCEDURE — 2580000003 HC RX 258: Performed by: NURSE PRACTITIONER

## 2023-11-28 PROCEDURE — 2580000003 HC RX 258: Performed by: ORTHOPAEDIC SURGERY

## 2023-11-28 PROCEDURE — 6370000000 HC RX 637 (ALT 250 FOR IP): Performed by: PHYSICIAN ASSISTANT

## 2023-11-28 PROCEDURE — 3600000005 HC SURGERY LEVEL 5 BASE: Performed by: ORTHOPAEDIC SURGERY

## 2023-11-28 PROCEDURE — 2580000003 HC RX 258: Performed by: PHYSICIAN ASSISTANT

## 2023-11-28 PROCEDURE — 3700000001 HC ADD 15 MINUTES (ANESTHESIA): Performed by: ORTHOPAEDIC SURGERY

## 2023-11-28 DEVICE — PINNACLE CANCELLOUS BONE SCREW 6.5MM X 40MM
Type: IMPLANTABLE DEVICE | Site: HIP | Status: FUNCTIONAL
Brand: PINNACLE

## 2023-11-28 DEVICE — HIP H1 TOT STD COCR HD IMPL CAPPED SYNTHES: Type: IMPLANTABLE DEVICE | Site: HIP | Status: FUNCTIONAL

## 2023-11-28 DEVICE — PINNACLE GRIPTION ACETABULAR SHELL SECTOR 54MM OD
Type: IMPLANTABLE DEVICE | Site: HIP | Status: FUNCTIONAL
Brand: PINNACLE GRIPTION

## 2023-11-28 DEVICE — M-SPEC METAL FEMORAL HEAD 12/14 TAPER DIAMETER 36MM -2: Type: IMPLANTABLE DEVICE | Site: HIP | Status: FUNCTIONAL

## 2023-11-28 DEVICE — ACTIS DUOFIX HIP PROSTHESIS (FEMORAL STEM 12/14 TAPER CEMENTLESS SIZE 7 HIGH COLLAR)  CE
Type: IMPLANTABLE DEVICE | Site: HIP | Status: FUNCTIONAL
Brand: ACTIS

## 2023-11-28 DEVICE — PINNACLE HIP SOLUTIONS ALTRX POLYETHYLENE ACETABULAR LINER NEUTRAL 36MM ID 54MM OD
Type: IMPLANTABLE DEVICE | Site: HIP | Status: FUNCTIONAL
Brand: PINNACLE ALTRX

## 2023-11-28 RX ORDER — ONDANSETRON 2 MG/ML
4 INJECTION INTRAMUSCULAR; INTRAVENOUS EVERY 6 HOURS PRN
Status: DISCONTINUED | OUTPATIENT
Start: 2023-11-28 | End: 2023-11-28 | Stop reason: SDUPTHER

## 2023-11-28 RX ORDER — NEOSTIGMINE METHYLSULFATE 1 MG/ML
INJECTION, SOLUTION INTRAVENOUS PRN
Status: DISCONTINUED | OUTPATIENT
Start: 2023-11-28 | End: 2023-11-28 | Stop reason: SDUPTHER

## 2023-11-28 RX ORDER — KETOROLAC TROMETHAMINE 30 MG/ML
15 INJECTION, SOLUTION INTRAMUSCULAR; INTRAVENOUS EVERY 6 HOURS
Status: COMPLETED | OUTPATIENT
Start: 2023-11-28 | End: 2023-11-29

## 2023-11-28 RX ORDER — SODIUM CHLORIDE 0.9 % (FLUSH) 0.9 %
5-40 SYRINGE (ML) INJECTION PRN
Status: DISCONTINUED | OUTPATIENT
Start: 2023-11-28 | End: 2023-11-28 | Stop reason: HOSPADM

## 2023-11-28 RX ORDER — OXYCODONE HYDROCHLORIDE 5 MG/1
5 TABLET ORAL
Status: DISCONTINUED | OUTPATIENT
Start: 2023-11-28 | End: 2023-11-29 | Stop reason: HOSPADM

## 2023-11-28 RX ORDER — LIDOCAINE HYDROCHLORIDE 20 MG/ML
INJECTION, SOLUTION EPIDURAL; INFILTRATION; INTRACAUDAL; PERINEURAL PRN
Status: DISCONTINUED | OUTPATIENT
Start: 2023-11-28 | End: 2023-11-28 | Stop reason: SDUPTHER

## 2023-11-28 RX ORDER — GLYCOPYRROLATE 0.2 MG/ML
INJECTION, SOLUTION INTRAMUSCULAR; INTRAVENOUS PRN
Status: DISCONTINUED | OUTPATIENT
Start: 2023-11-28 | End: 2023-11-28 | Stop reason: SDUPTHER

## 2023-11-28 RX ORDER — SUCCINYLCHOLINE CHLORIDE 20 MG/ML
INJECTION INTRAMUSCULAR; INTRAVENOUS PRN
Status: DISCONTINUED | OUTPATIENT
Start: 2023-11-28 | End: 2023-11-28 | Stop reason: SDUPTHER

## 2023-11-28 RX ORDER — ACETAMINOPHEN 500 MG
500 TABLET ORAL
Status: DISCONTINUED | OUTPATIENT
Start: 2023-11-28 | End: 2023-11-29 | Stop reason: HOSPADM

## 2023-11-28 RX ORDER — POLYETHYLENE GLYCOL 3350 17 G/17G
17 POWDER, FOR SOLUTION ORAL DAILY PRN
Status: DISCONTINUED | OUTPATIENT
Start: 2023-11-28 | End: 2023-11-29 | Stop reason: HOSPADM

## 2023-11-28 RX ORDER — SODIUM CHLORIDE 0.9 % (FLUSH) 0.9 %
5-40 SYRINGE (ML) INJECTION EVERY 12 HOURS SCHEDULED
Status: DISCONTINUED | OUTPATIENT
Start: 2023-11-28 | End: 2023-11-28 | Stop reason: HOSPADM

## 2023-11-28 RX ORDER — HYDROMORPHONE HYDROCHLORIDE 1 MG/ML
0.5 INJECTION, SOLUTION INTRAMUSCULAR; INTRAVENOUS; SUBCUTANEOUS EVERY 4 HOURS PRN
Status: DISCONTINUED | OUTPATIENT
Start: 2023-11-28 | End: 2023-11-29 | Stop reason: HOSPADM

## 2023-11-28 RX ORDER — SENNA AND DOCUSATE SODIUM 50; 8.6 MG/1; MG/1
1 TABLET, FILM COATED ORAL 2 TIMES DAILY
Status: DISCONTINUED | OUTPATIENT
Start: 2023-11-28 | End: 2023-11-29 | Stop reason: HOSPADM

## 2023-11-28 RX ORDER — SODIUM CHLORIDE 0.9 % (FLUSH) 0.9 %
5-40 SYRINGE (ML) INJECTION PRN
Status: DISCONTINUED | OUTPATIENT
Start: 2023-11-28 | End: 2023-11-29 | Stop reason: HOSPADM

## 2023-11-28 RX ORDER — FENTANYL CITRATE 50 UG/ML
25 INJECTION, SOLUTION INTRAMUSCULAR; INTRAVENOUS EVERY 5 MIN PRN
Status: DISCONTINUED | OUTPATIENT
Start: 2023-11-28 | End: 2023-11-28 | Stop reason: HOSPADM

## 2023-11-28 RX ORDER — SODIUM CHLORIDE 9 MG/ML
INJECTION, SOLUTION INTRAVENOUS CONTINUOUS
Status: DISCONTINUED | OUTPATIENT
Start: 2023-11-28 | End: 2023-11-29 | Stop reason: HOSPADM

## 2023-11-28 RX ORDER — HYDROMORPHONE HYDROCHLORIDE 1 MG/ML
0.5 INJECTION, SOLUTION INTRAMUSCULAR; INTRAVENOUS; SUBCUTANEOUS EVERY 5 MIN PRN
Status: DISCONTINUED | OUTPATIENT
Start: 2023-11-28 | End: 2023-11-28 | Stop reason: HOSPADM

## 2023-11-28 RX ORDER — OXYCODONE HYDROCHLORIDE 5 MG/1
2.5 TABLET ORAL
Status: DISCONTINUED | OUTPATIENT
Start: 2023-11-28 | End: 2023-11-29 | Stop reason: HOSPADM

## 2023-11-28 RX ORDER — PROCHLORPERAZINE EDISYLATE 5 MG/ML
5 INJECTION INTRAMUSCULAR; INTRAVENOUS
Status: DISCONTINUED | OUTPATIENT
Start: 2023-11-28 | End: 2023-11-28 | Stop reason: HOSPADM

## 2023-11-28 RX ORDER — ONDANSETRON 2 MG/ML
4 INJECTION INTRAMUSCULAR; INTRAVENOUS
Status: DISCONTINUED | OUTPATIENT
Start: 2023-11-28 | End: 2023-11-28 | Stop reason: HOSPADM

## 2023-11-28 RX ORDER — DEXAMETHASONE SODIUM PHOSPHATE 4 MG/ML
INJECTION, SOLUTION INTRA-ARTICULAR; INTRALESIONAL; INTRAMUSCULAR; INTRAVENOUS; SOFT TISSUE PRN
Status: DISCONTINUED | OUTPATIENT
Start: 2023-11-28 | End: 2023-11-28 | Stop reason: SDUPTHER

## 2023-11-28 RX ORDER — MIDAZOLAM HYDROCHLORIDE 1 MG/ML
INJECTION INTRAMUSCULAR; INTRAVENOUS PRN
Status: DISCONTINUED | OUTPATIENT
Start: 2023-11-28 | End: 2023-11-28 | Stop reason: SDUPTHER

## 2023-11-28 RX ORDER — SODIUM CHLORIDE 9 MG/ML
INJECTION, SOLUTION INTRAVENOUS PRN
Status: DISCONTINUED | OUTPATIENT
Start: 2023-11-28 | End: 2023-11-29 | Stop reason: HOSPADM

## 2023-11-28 RX ORDER — BISACODYL 10 MG
10 SUPPOSITORY, RECTAL RECTAL DAILY PRN
Status: DISCONTINUED | OUTPATIENT
Start: 2023-11-28 | End: 2023-11-29 | Stop reason: HOSPADM

## 2023-11-28 RX ORDER — SODIUM CHLORIDE, SODIUM LACTATE, POTASSIUM CHLORIDE, CALCIUM CHLORIDE 600; 310; 30; 20 MG/100ML; MG/100ML; MG/100ML; MG/100ML
INJECTION, SOLUTION INTRAVENOUS CONTINUOUS PRN
Status: DISCONTINUED | OUTPATIENT
Start: 2023-11-28 | End: 2023-11-28 | Stop reason: SDUPTHER

## 2023-11-28 RX ORDER — ROCURONIUM BROMIDE 10 MG/ML
INJECTION, SOLUTION INTRAVENOUS PRN
Status: DISCONTINUED | OUTPATIENT
Start: 2023-11-28 | End: 2023-11-28 | Stop reason: SDUPTHER

## 2023-11-28 RX ORDER — 0.9 % SODIUM CHLORIDE 0.9 %
500 INTRAVENOUS SOLUTION INTRAVENOUS PRN
Status: DISCONTINUED | OUTPATIENT
Start: 2023-11-28 | End: 2023-11-29 | Stop reason: HOSPADM

## 2023-11-28 RX ORDER — EPHEDRINE SULFATE 50 MG/ML
INJECTION INTRAVENOUS PRN
Status: DISCONTINUED | OUTPATIENT
Start: 2023-11-28 | End: 2023-11-28 | Stop reason: SDUPTHER

## 2023-11-28 RX ORDER — HYDROXYZINE HYDROCHLORIDE 10 MG/1
10 TABLET, FILM COATED ORAL EVERY 8 HOURS PRN
Status: DISCONTINUED | OUTPATIENT
Start: 2023-11-28 | End: 2023-11-29 | Stop reason: HOSPADM

## 2023-11-28 RX ORDER — FENTANYL CITRATE 50 UG/ML
INJECTION, SOLUTION INTRAMUSCULAR; INTRAVENOUS PRN
Status: DISCONTINUED | OUTPATIENT
Start: 2023-11-28 | End: 2023-11-28 | Stop reason: SDUPTHER

## 2023-11-28 RX ORDER — OXYCODONE HYDROCHLORIDE 5 MG/1
5 TABLET ORAL
Status: DISCONTINUED | OUTPATIENT
Start: 2023-11-28 | End: 2023-11-28 | Stop reason: HOSPADM

## 2023-11-28 RX ORDER — KETAMINE HCL IN NACL, ISO-OSM 100MG/10ML
SYRINGE (ML) INJECTION PRN
Status: DISCONTINUED | OUTPATIENT
Start: 2023-11-28 | End: 2023-11-28 | Stop reason: SDUPTHER

## 2023-11-28 RX ORDER — HYDRALAZINE HYDROCHLORIDE 20 MG/ML
10 INJECTION INTRAMUSCULAR; INTRAVENOUS
Status: DISCONTINUED | OUTPATIENT
Start: 2023-11-28 | End: 2023-11-28 | Stop reason: HOSPADM

## 2023-11-28 RX ORDER — ASPIRIN 81 MG/1
81 TABLET ORAL 2 TIMES DAILY
Status: DISCONTINUED | OUTPATIENT
Start: 2023-11-28 | End: 2023-11-29 | Stop reason: HOSPADM

## 2023-11-28 RX ORDER — ONDANSETRON 4 MG/1
4 TABLET, ORALLY DISINTEGRATING ORAL EVERY 8 HOURS PRN
Status: DISCONTINUED | OUTPATIENT
Start: 2023-11-28 | End: 2023-11-28 | Stop reason: SDUPTHER

## 2023-11-28 RX ORDER — ONDANSETRON 2 MG/ML
INJECTION INTRAMUSCULAR; INTRAVENOUS PRN
Status: DISCONTINUED | OUTPATIENT
Start: 2023-11-28 | End: 2023-11-28 | Stop reason: SDUPTHER

## 2023-11-28 RX ORDER — SODIUM CHLORIDE 9 MG/ML
INJECTION, SOLUTION INTRAVENOUS PRN
Status: DISCONTINUED | OUTPATIENT
Start: 2023-11-28 | End: 2023-11-28 | Stop reason: HOSPADM

## 2023-11-28 RX ORDER — SODIUM CHLORIDE 0.9 % (FLUSH) 0.9 %
5-40 SYRINGE (ML) INJECTION EVERY 12 HOURS SCHEDULED
Status: DISCONTINUED | OUTPATIENT
Start: 2023-11-28 | End: 2023-11-29 | Stop reason: HOSPADM

## 2023-11-28 RX ADMIN — ACETAMINOPHEN 500 MG: 500 TABLET ORAL at 21:19

## 2023-11-28 RX ADMIN — Medication 25 MG: at 14:03

## 2023-11-28 RX ADMIN — SODIUM CHLORIDE: 9 INJECTION, SOLUTION INTRAVENOUS at 21:22

## 2023-11-28 RX ADMIN — GLYCOPYRROLATE 0.4 MG: 0.2 INJECTION, SOLUTION INTRAMUSCULAR; INTRAVENOUS at 15:10

## 2023-11-28 RX ADMIN — ROSUVASTATIN 20 MG: 10 TABLET, FILM COATED ORAL at 21:19

## 2023-11-28 RX ADMIN — SODIUM CHLORIDE: 9 INJECTION, SOLUTION INTRAVENOUS at 18:21

## 2023-11-28 RX ADMIN — CEFAZOLIN 2000 MG: 1 INJECTION, POWDER, FOR SOLUTION INTRAMUSCULAR; INTRAVENOUS at 19:09

## 2023-11-28 RX ADMIN — ROCURONIUM BROMIDE 40 MG: 10 SOLUTION INTRAVENOUS at 13:23

## 2023-11-28 RX ADMIN — NEOSTIGMINE METHYLSULFATE 1 MG: 1 INJECTION, SOLUTION INTRAVENOUS at 15:14

## 2023-11-28 RX ADMIN — ONDANSETRON 4 MG: 2 INJECTION INTRAMUSCULAR; INTRAVENOUS at 15:06

## 2023-11-28 RX ADMIN — SENNOSIDES AND DOCUSATE SODIUM 1 TABLET: 8.6; 5 TABLET ORAL at 21:19

## 2023-11-28 RX ADMIN — WATER 2000 MG: 1 INJECTION INTRAMUSCULAR; INTRAVENOUS; SUBCUTANEOUS at 13:16

## 2023-11-28 RX ADMIN — EPHEDRINE SULFATE 10 MG: 50 INJECTION INTRAVENOUS at 13:27

## 2023-11-28 RX ADMIN — TRANEXAMIC ACID 1 G: 100 INJECTION, SOLUTION INTRAVENOUS at 13:36

## 2023-11-28 RX ADMIN — Medication 25 MG: at 13:52

## 2023-11-28 RX ADMIN — ROCURONIUM BROMIDE 10 MG: 10 SOLUTION INTRAVENOUS at 13:11

## 2023-11-28 RX ADMIN — ASPIRIN 81 MG: 81 TABLET, COATED ORAL at 21:19

## 2023-11-28 RX ADMIN — EPHEDRINE SULFATE 10 MG: 50 INJECTION INTRAVENOUS at 14:56

## 2023-11-28 RX ADMIN — LIDOCAINE HYDROCHLORIDE 100 MG: 20 INJECTION, SOLUTION EPIDURAL; INFILTRATION; INTRACAUDAL; PERINEURAL at 13:09

## 2023-11-28 RX ADMIN — SODIUM CHLORIDE, PRESERVATIVE FREE 10 ML: 5 INJECTION INTRAVENOUS at 10:27

## 2023-11-28 RX ADMIN — PROPOFOL 150 MG: 10 INJECTION, EMULSION INTRAVENOUS at 13:11

## 2023-11-28 RX ADMIN — DEXAMETHASONE SODIUM PHOSPHATE 4 MG: 4 INJECTION, SOLUTION INTRAMUSCULAR; INTRAVENOUS at 13:28

## 2023-11-28 RX ADMIN — MIDAZOLAM 2 MG: 1 INJECTION INTRAMUSCULAR; INTRAVENOUS at 13:01

## 2023-11-28 RX ADMIN — SODIUM CHLORIDE, POTASSIUM CHLORIDE, SODIUM LACTATE AND CALCIUM CHLORIDE: 600; 310; 30; 20 INJECTION, SOLUTION INTRAVENOUS at 14:58

## 2023-11-28 RX ADMIN — PHENYLEPHRINE HYDROCHLORIDE 40 MCG/MIN: 10 INJECTION INTRAVENOUS at 13:30

## 2023-11-28 RX ADMIN — ACETAMINOPHEN 650 MG: 325 TABLET ORAL at 11:31

## 2023-11-28 RX ADMIN — NEOSTIGMINE METHYLSULFATE 1 MG: 1 INJECTION, SOLUTION INTRAVENOUS at 15:17

## 2023-11-28 RX ADMIN — SODIUM CHLORIDE, POTASSIUM CHLORIDE, SODIUM LACTATE AND CALCIUM CHLORIDE: 600; 310; 30; 20 INJECTION, SOLUTION INTRAVENOUS at 08:43

## 2023-11-28 RX ADMIN — NEOSTIGMINE METHYLSULFATE 1 MG: 1 INJECTION, SOLUTION INTRAVENOUS at 15:16

## 2023-11-28 RX ADMIN — HYDROMORPHONE HYDROCHLORIDE 1 MG: 1 INJECTION, SOLUTION INTRAMUSCULAR; INTRAVENOUS; SUBCUTANEOUS at 13:46

## 2023-11-28 RX ADMIN — ONDANSETRON 4 MG: 2 INJECTION INTRAMUSCULAR; INTRAVENOUS at 18:15

## 2023-11-28 RX ADMIN — SODIUM CHLORIDE: 9 INJECTION, SOLUTION INTRAVENOUS at 02:44

## 2023-11-28 RX ADMIN — SUCCINYLCHOLINE CHLORIDE 180 MG: 20 INJECTION, SOLUTION INTRAMUSCULAR; INTRAVENOUS at 13:11

## 2023-11-28 RX ADMIN — KETOROLAC TROMETHAMINE 15 MG: 30 INJECTION, SOLUTION INTRAMUSCULAR; INTRAVENOUS at 18:17

## 2023-11-28 RX ADMIN — FENTANYL CITRATE 50 MCG: 50 INJECTION, SOLUTION INTRAMUSCULAR; INTRAVENOUS at 13:06

## 2023-11-28 RX ADMIN — FENTANYL CITRATE 50 MCG: 50 INJECTION, SOLUTION INTRAMUSCULAR; INTRAVENOUS at 12:44

## 2023-11-28 RX ADMIN — HYDROMORPHONE HYDROCHLORIDE 1 MG: 1 INJECTION, SOLUTION INTRAMUSCULAR; INTRAVENOUS; SUBCUTANEOUS at 14:50

## 2023-11-28 ASSESSMENT — PAIN DESCRIPTION - ORIENTATION
ORIENTATION: RIGHT
ORIENTATION: LOWER;POSTERIOR

## 2023-11-28 ASSESSMENT — PAIN DESCRIPTION - DESCRIPTORS
DESCRIPTORS: ACHING
DESCRIPTORS: ACHING
DESCRIPTORS: STABBING;SHARP

## 2023-11-28 ASSESSMENT — PAIN SCALES - GENERAL
PAINLEVEL_OUTOF10: 8
PAINLEVEL_OUTOF10: 0
PAINLEVEL_OUTOF10: 0
PAINLEVEL_OUTOF10: 2
PAINLEVEL_OUTOF10: 0

## 2023-11-28 ASSESSMENT — PAIN - FUNCTIONAL ASSESSMENT
PAIN_FUNCTIONAL_ASSESSMENT: ACTIVITIES ARE NOT PREVENTED
PAIN_FUNCTIONAL_ASSESSMENT: PREVENTS OR INTERFERES SOME ACTIVE ACTIVITIES AND ADLS
PAIN_FUNCTIONAL_ASSESSMENT: 0-10

## 2023-11-28 ASSESSMENT — PAIN DESCRIPTION - LOCATION
LOCATION: HEAD
LOCATION: HIP

## 2023-11-28 NOTE — DISCHARGE INSTRUCTIONS
visit. Incision Care  The light brown Aquacel surgical dressing is waterproof and is to remain on your incision for 7 days. On the 7th day, carefully lift the edge of the dressing to break the adhesive seal and gently peel it off. If your Aquacel dressings comes loose or falls off before the 7th day, replace it with a dry sterile gauze dressing and change this dressing daily. Once there is no drainage on the bandage, you mean leave the incision open to air. You may take a shower with the Aquacel dressing in place. After you remove the Aquacel dressing on day 7, you may continue to shower and get your incision wet in the shower. Do not submerge your incision under water in a bathtub, hot tub, swimming pool, etc. until after you have been evaluated at your first office visit. Medications  Blood Clot Prevention: Take medication as prescribed by your physician for 4 weeks postop. Pain Management: Take pain medication as prescribed; wean yourself off of pain medication as your pain lessens. Take with food. You make also take Tylenol every 4-6 hours as needed for pain. Do not exceed 3 grams (3000mg) per day. Place an ice bag on or around the incision for 20 minutes on / 20 minutes off as needed throughout the day and night, especially after exercising. Stool Softener: You may want to take a stool softener (such as Senokot-S or Colace) to prevent constipation while taking pain medication. If constipation occurs, you may also use a laxative (such as Dulcolax tablets, Miralax, or a suppository). Diet  Resume usual diet at home. Drink plenty of fluids. Eat foods high in fiber and protein. Calcium and Vitamin D supplements recommended. Avoid alcoholic beverages. No smoking. When to call your Orthopaedic Surgeon:  If you call after 5pm or on a weekend, the on call physician will return your call  Pain that is not relieved by pain medication, ice, and activity modification  Signs of infection (red incision,

## 2023-11-28 NOTE — FLOWSHEET NOTE
11/28/23 1355   Family Communication    Relationship to Patient Spouse    Phone Number [phone]/CHARAN BAGLEY   Family/Significant Other Update Called   Delivery Origin Nurse   Update Given Yes   Family Communication   Family Update Message Procedure started

## 2023-11-28 NOTE — CONSULTS
ORTHOPAEDIC CONSULT NOTE     Subjective:      Date of Consultation:  November 27, 2023  Referring Physician:  Thanh/Chani Huff Mamadou is a 70 y.o. female with past medical history significant for hypertension, hypercholesterolemia as well as orthopedic history significant for left total knee arthroplasty approximately 10 months ago by me is seen for right hip pain and inability to bear weight following a fall yesterday. Patient states she was carrying a box down some stairs and went backwards on the last step and subsequently missed a step falling onto her right side. She states she has pain on the right lateral side of her thigh radiating into her groin and slightly down into her thigh. She denies knee or ankle pain. She denies any low back pain. She denies any new onset tingling or numbness. She denies any open injuries. She states she lives in a multilevel home and is normally an independent ambulator. She lives with her . She states that she is normally able to walk approximately as much as she would like. She was brought to the emergency department found to have a right femoral neck fracture for which we have been asked see the patient.     Past Medical History:   Diagnosis Date    Arrhythmia     HEART PALPITATIONS OCCASIONALLY    Arthritis     Chronic pain 2016    LEFT KNEE    Colon polyps     hyperplastic    Hypercholesterolemia     Hypertension     Nausea & vomiting     Psychiatric disorder     ANXIETY AND DEPRESSION     Past Surgical History:   Procedure Laterality Date    CAROTID ENDARTERECTOMY Right 07/01/2019    Dr. David Quigley    COLONOSCOPY  08/2009    HEENT      T&A (childhood)    HERNIA REPAIR      umbilical    ORTHOPEDIC SURGERY Right     JOINT SURGERY ON MIDDLE FINGER TO REMOVE A GROWTH-PT DENIES    ORTHOPEDIC SURGERY Left 2016    MENISCUS REPAIR    TOTAL KNEE ARTHROPLASTY Left 01/05/2023    TUBAL LIGATION  1990     Allergies   Allergen Reactions    Ace Inhibitors Cough
femoral neck ([address])  Resolved Problems:    * No resolved hospital problems. *      Plan:   Acute right femoral neck fracture    Arneta Nam of injury discussed in detail with patient and  today. Fracture is indicated for operative fixation with either hemiarthroplasty or total hip arthroplasty. Considering patient's level of baseline function which is reasonably high patient likely indicated for total hip arthroplasty as her age does not preclude this. Risks and benefits of operative versus nonoperative fixation discussed with patient and  who are in agreement with proceeding operatively.   They are pleased to hear the Dr. Raúl Walker has agreed to help manage her fracture  Pain control has been an issue so far been doing better on fentanyl which may continue or transition to oral oxycodone  Remain nonweightbearing through right lower extremity  N.p.o. after midnight  Consent for left total hip arthroplasty  Posted for OR on 11/28  Admitting to medicine for preoperative evaluation medical management  Hold chemical DVT prophylaxis for now but okay for SCDs    Dr Raúl Walker aware patient in agreement with current plan of care      Adiel Curry PA-C  Orthopedic Trauma Service  [address] [facility]

## 2023-11-28 NOTE — ANESTHESIA POSTPROCEDURE EVALUATION
Post-Anesthesia Evaluation and Assessment    Patient: Stacey Rahman MRN: [id number]  SSN: [id number]    YOB: 1945  Age: 70 y.o. Sex: female      I have evaluated the patient and they are stable and ready for discharge from the PACU. Cardiovascular Function/Vital Signs stable    Patient is status post General anesthesia for Procedure(s):  RIGHT TOTAL HIP/ DEPUY AWARE/ ;anterior APPROACH. Nausea/Vomiting: None    Postoperative hydration reviewed and adequate. Pain:  Managed    Neurological Status: At baseline    Mental Status, Level of Consciousness: Alert and  oriented to person, place, and time    Pulmonary Status:   Adequate oxygenation and airway patent    Complications related to anesthesia: None    Post-anesthesia assessment completed.  No concerns    Signed By: Lisa Daly MD     November 28, 2023

## 2023-11-28 NOTE — BRIEF OP NOTE
Brief Postoperative Note      Patient: Ean Becker  YOB: 1945  MRN: [id number]    Date of Procedure: 11/28/2023    Pre-Op Diagnosis Codes:     * Closed fracture of right hip, initial encounter ([address] [facility]) [S72.001A]    Post-Op Diagnosis: Same       Procedure(s):  RIGHT TOTAL HIP/ DEPUY AWARE/ ;anterior APPROACH    Surgeon(s):  Damir Foreman MD    Assistant:  Surgical Assistant: Puneet Vasquez    Anesthesia: General    Estimated Blood Loss (mL): 232     Complications: None, Hypovolemia    Specimens:   * No specimens in log *    Implants:  Implant Name Type Inv.  Item Serial No.  Lot No. LRB No. Used Action   CUP ACET [id number] HIP GRIPTION REYMUNDO CEMENTLESS FIX SECT SER - SNA  CUP ACET [id number] HIP GRIPTION REYMUNDO CEMENTLESS FIX SECT SER NA [facility] [employer] [facility] [id number] Right 1 Implanted   SCREW BNE L40MM DIA6.5MM CANC HIP DOME PINN - SNA  SCREW BNE L40MM DIA6.5MM CANC HIP DOME PINN NA [facility] [employer] [facility] [id number] Right 1 Implanted   LINER ACET OD54MM ID36MM HIP ALTRX PINN - SNA  LINER ACET OD54MM ID36MM HIP ALTRX PINN NA [facility] [employer] [facility] [id number] Right 1 Implanted   STEM FEM SZ 7 HIP HI OFFSET CLLRD CEMENTLESS 12/14 TAPR - SNA  STEM FEM SZ 7 HIP HI OFFSET CLLRD CEMENTLESS 12/14 TAPR NA [facility] [id number] Right 1 Implanted   HEAD FEM [id number] -2MM OFFSET 12/14 TAPR ARTC EZ HIP MTL - SN/A  HEAD FEM [id number] -2MM OFFSET 12/14 TAPR ARTC EZ HIP MTL N/A [facility] [id number] Right 1 Implanted         Drains: * No LDAs found *    Findings: displaced femoral neck fracture      Electronically signed by Damir Foreman MD on 11/28/2023 at 3:32 PM

## 2023-11-29 VITALS
DIASTOLIC BLOOD PRESSURE: 38 MMHG | BODY MASS INDEX: 24.25 KG/M2 | WEIGHT: 160 LBS | RESPIRATION RATE: 17 BRPM | TEMPERATURE: 98.6 F | SYSTOLIC BLOOD PRESSURE: 114 MMHG | OXYGEN SATURATION: 98 % | HEIGHT: 68 IN | HEART RATE: 63 BPM

## 2023-11-29 PROBLEM — M16.11 PRIMARY OSTEOARTHRITIS OF RIGHT HIP: Status: ACTIVE | Noted: 2023-11-29

## 2023-11-29 PROBLEM — S72.001A CLOSED DISPLACED FRACTURE OF RIGHT FEMORAL NECK (HCC): Status: RESOLVED | Noted: 2023-11-27 | Resolved: 2023-11-29

## 2023-11-29 LAB
ANION GAP SERPL CALC-SCNC: 7 MMOL/L (ref 5–15)
BASOPHILS # BLD: 0 K/UL (ref 0–0.1)
BASOPHILS NFR BLD: 0 % (ref 0–1)
BUN SERPL-MCNC: 15 MG/DL (ref 6–20)
BUN/CREAT SERPL: 21 (ref 12–20)
CALCIUM SERPL-MCNC: 8.2 MG/DL (ref 8.5–10.1)
CHLORIDE SERPL-SCNC: 112 MMOL/L (ref 97–108)
CO2 SERPL-SCNC: 20 MMOL/L (ref 21–32)
CREAT SERPL-MCNC: 0.72 MG/DL (ref 0.55–1.02)
DIFFERENTIAL METHOD BLD: ABNORMAL
EOSINOPHIL # BLD: 0 K/UL (ref 0–0.4)
EOSINOPHIL NFR BLD: 0 % (ref 0–7)
ERYTHROCYTE [DISTWIDTH] IN BLOOD BY AUTOMATED COUNT: 13.5 % (ref 11.5–14.5)
GLUCOSE SERPL-MCNC: 119 MG/DL (ref 65–100)
HCT VFR BLD AUTO: 29 % (ref 35–47)
HGB BLD-MCNC: 9.4 G/DL (ref 11.5–16)
IMM GRANULOCYTES # BLD AUTO: 0 K/UL (ref 0–0.04)
IMM GRANULOCYTES NFR BLD AUTO: 0 % (ref 0–0.5)
LYMPHOCYTES # BLD: 0.9 K/UL (ref 0.8–3.5)
LYMPHOCYTES NFR BLD: 11 % (ref 12–49)
MCH RBC QN AUTO: 31.2 PG (ref 26–34)
MCHC RBC AUTO-ENTMCNC: 32.4 G/DL (ref 30–36.5)
MCV RBC AUTO: 96.3 FL (ref 80–99)
MONOCYTES # BLD: 0.6 K/UL (ref 0–1)
MONOCYTES NFR BLD: 7 % (ref 5–13)
NEUTS SEG # BLD: 7 K/UL (ref 1.8–8)
NEUTS SEG NFR BLD: 82 % (ref 32–75)
NRBC # BLD: 0 K/UL (ref 0–0.01)
NRBC BLD-RTO: 0 PER 100 WBC
PLATELET # BLD AUTO: 133 K/UL (ref 150–400)
PMV BLD AUTO: 11.2 FL (ref 8.9–12.9)
POTASSIUM SERPL-SCNC: 3.8 MMOL/L (ref 3.5–5.1)
RBC # BLD AUTO: 3.01 M/UL (ref 3.8–5.2)
SODIUM SERPL-SCNC: 139 MMOL/L (ref 136–145)
WBC # BLD AUTO: 8.6 K/UL (ref 3.6–11)

## 2023-11-29 PROCEDURE — 6370000000 HC RX 637 (ALT 250 FOR IP): Performed by: PHYSICIAN ASSISTANT

## 2023-11-29 PROCEDURE — 97165 OT EVAL LOW COMPLEX 30 MIN: CPT

## 2023-11-29 PROCEDURE — 97535 SELF CARE MNGMENT TRAINING: CPT

## 2023-11-29 PROCEDURE — 6360000002 HC RX W HCPCS: Performed by: PHYSICIAN ASSISTANT

## 2023-11-29 PROCEDURE — 80048 BASIC METABOLIC PNL TOTAL CA: CPT

## 2023-11-29 PROCEDURE — 97530 THERAPEUTIC ACTIVITIES: CPT

## 2023-11-29 PROCEDURE — 97161 PT EVAL LOW COMPLEX 20 MIN: CPT

## 2023-11-29 PROCEDURE — 2580000003 HC RX 258: Performed by: PHYSICIAN ASSISTANT

## 2023-11-29 PROCEDURE — 85025 COMPLETE CBC W/AUTO DIFF WBC: CPT

## 2023-11-29 PROCEDURE — 36415 COLL VENOUS BLD VENIPUNCTURE: CPT

## 2023-11-29 PROCEDURE — 97116 GAIT TRAINING THERAPY: CPT

## 2023-11-29 RX ORDER — SENNA AND DOCUSATE SODIUM 50; 8.6 MG/1; MG/1
1 TABLET, FILM COATED ORAL 2 TIMES DAILY
Qty: 60 TABLET | Refills: 0 | Status: SHIPPED | OUTPATIENT
Start: 2023-11-29 | End: 2023-12-29

## 2023-11-29 RX ORDER — ASPIRIN 81 MG/1
81 TABLET ORAL 2 TIMES DAILY
Qty: 60 TABLET | Refills: 0 | Status: SHIPPED | OUTPATIENT
Start: 2023-11-29 | End: 2023-12-29

## 2023-11-29 RX ORDER — OXYCODONE HYDROCHLORIDE 5 MG/1
5 TABLET ORAL EVERY 4 HOURS PRN
Qty: 12 TABLET | Refills: 0 | Status: SHIPPED | OUTPATIENT
Start: 2023-11-29 | End: 2023-12-02

## 2023-11-29 RX ADMIN — ACETAMINOPHEN 500 MG: 500 TABLET ORAL at 06:34

## 2023-11-29 RX ADMIN — ACETAMINOPHEN 500 MG: 500 TABLET ORAL at 08:47

## 2023-11-29 RX ADMIN — CEFAZOLIN 2000 MG: 1 INJECTION, POWDER, FOR SOLUTION INTRAMUSCULAR; INTRAVENOUS at 03:02

## 2023-11-29 RX ADMIN — KETOROLAC TROMETHAMINE 15 MG: 30 INJECTION, SOLUTION INTRAMUSCULAR; INTRAVENOUS at 11:48

## 2023-11-29 RX ADMIN — KETOROLAC TROMETHAMINE 15 MG: 30 INJECTION, SOLUTION INTRAMUSCULAR; INTRAVENOUS at 06:34

## 2023-11-29 RX ADMIN — SENNOSIDES AND DOCUSATE SODIUM 1 TABLET: 8.6; 5 TABLET ORAL at 08:47

## 2023-11-29 RX ADMIN — KETOROLAC TROMETHAMINE 15 MG: 30 INJECTION, SOLUTION INTRAMUSCULAR; INTRAVENOUS at 00:12

## 2023-11-29 RX ADMIN — ASPIRIN 81 MG: 81 TABLET, COATED ORAL at 08:47

## 2023-11-29 ASSESSMENT — PAIN SCALES - GENERAL
PAINLEVEL_OUTOF10: 0
PAINLEVEL_OUTOF10: 0

## 2023-11-29 NOTE — CARE COORDINATION
Care Management Initial Assessment       RUR:15%  Readmission? No  1st IM letter given? Yes     Transition of Care: Plan for discharge home with spouse and HH. AT [address] [place] [address] Lucina Roberto has accepted patient. Patient owns rolling walker and raised toilet seat. Family will transport patient home at discharge. Main contact, spouse Anisa Stephenson #[phone]           11/29/23 1148   Service Assessment   Patient Orientation Alert and Oriented   Cognition Alert   History Provided By Patient   Accompanied By/Relationship spouse   Support Systems Spouse/Significant Other   Can patient return to prior living arrangement Yes   Ability to make needs known: Good   Family able to assist with home care needs: Yes   Social/Functional History   Type of [address] to Live on Main level with bedroom/bathroom;Multi-level   Home Access Stairs to enter with rails   Entrance Stairs - Number of Steps [address], [facility] Discharge   Transition of Care Consult (CM Consult) Home Health   Confirm Follow Up Transport Family   Condition of Participation: Discharge Planning   The Plan for Transition of Care is related to the following treatment goals: home health   The Patient and/or Patient Representative was provided with a Choice of Provider? Patient   The Patient and/Or Patient Representative agree with the Discharge Plan? Yes   Freedom of Choice list was provided with basic dialogue that supports the patient's individualized plan of care/goals, treatment preferences, and shares the quality data associated with the providers?   Yes

## 2023-11-29 NOTE — OP NOTE
The patient was awakened, extubated, and transported to the postanesthesia care unit in stable condition. All counts were correct at the end of the procedure.       Tony Piedra MD      CC:  Violeta Zhang MD    [id number]  D:  11/28/2023 21:47  T:  11/28/2023 22:52  JOB #:  [id number]

## 2023-11-29 NOTE — PLAN OF CARE
2000 Received patient from [address].    6983 Bedside shift change report given to Spenser Lima RN (oncoming nurse) by Sara Kaiser RN. Report included the following information SBAR, Kardex, MAR, Recent Results, and Cardiac Rhythm NSR.      Problem: Discharge Planning  Goal: Discharge to home or other facility with appropriate resources  Outcome: Progressing     Problem: Pain  Goal: Verbalizes/displays adequate comfort level or baseline comfort level  Outcome: Progressing
Problem: Discharge Planning  Goal: Discharge to home or other facility with appropriate resources  11/28/2023 1842 by Kwabena Soriano LPN  Outcome: Progressing  11/28/2023 0514 by Bryanna King RN  Outcome: Progressing     Problem: Pain  Goal: Verbalizes/displays adequate comfort level or baseline comfort level  11/28/2023 1842 by Kwabena Soriano LPN  Outcome: Progressing  11/28/2023 0514 by Bryanna King RN  Outcome: Progressing     Problem: Safety - Adult  Goal: Free from fall injury  Outcome: Progressing     Problem: ABCDS Injury Assessment  Goal: Absence of physical injury  Outcome: Progressing     Problem: Skin/Tissue Integrity  Goal: Absence of new skin breakdown  Description: 1. Monitor for areas of redness and/or skin breakdown  2. Assess vascular access sites hourly  3. Every 4-6 hours minimum:  Change oxygen saturation probe site  4. Every 4-6 hours:  If on nasal continuous positive airway pressure, respiratory therapy assess nares and determine need for appliance change or resting period.   Outcome: Progressing
Problem: Discharge Planning  Goal: Discharge to home or other facility with appropriate resources  11/29/2023 0002 by Belkis Rodas RN  Outcome: Progressing  Flowsheets (Taken 11/28/2023 2030)  Discharge to home or other facility with appropriate resources: Identify barriers to discharge with patient and caregiver  11/28/2023 1842 by Mukesh Mancilla LPN  Outcome: Progressing     Problem: Pain  Goal: Verbalizes/displays adequate comfort level or baseline comfort level  11/29/2023 0002 by Belkis Rodas RN  Outcome: Progressing  Flowsheets (Taken 11/28/2023 2115)  Verbalizes/displays adequate comfort level or baseline comfort level: Assess pain using appropriate pain scale  11/28/2023 1842 by Mukesh Mancilla LPN  Outcome: Progressing     Problem: Safety - Adult  Goal: Free from fall injury  11/28/2023 1842 by Mukesh Mancilla LPN  Outcome: Progressing     Problem: ABCDS Injury Assessment  Goal: Absence of physical injury  11/29/2023 0002 by Belkis Rodas RN  Outcome: Progressing  11/28/2023 1842 by Mukesh Mancilla LPN  Outcome: Progressing
Problem: Physical Therapy - Adult  Goal: By Discharge: Performs mobility at highest level of function for planned discharge setting. See evaluation for individualized goals. Description: FUNCTIONAL STATUS PRIOR TO ADMISSION: Patient was independent and active without use of DME.    HOME SUPPORT PRIOR TO ADMISSION: The patient lived with  but did not require assistance. Physical Therapy Goals  Initiated 11/29/2023  1. Patient will move from supine to sit and sit to supine and roll side to side in bed with modified independence within 7 day(s). 2.  Patient will perform sit to stand with modified independence within 7 day(s). 3.  Patient will transfer from bed to chair and chair to bed with modified independence using the least restrictive device within 7 day(s). 4.  Patient will ambulate with modified independence for 150 feet with the least restrictive device within 7 day(s). 5.  Patient will ascend/descend 6 stairs with 1 handrail(s) with modified independence within 7 day(s). 11/29/2023 1315 by Sharyn Chen PT  Outcome: Progressing   PHYSICAL THERAPY TREATMENT    Patient: Heriberto Mckinley (88 y.o. female)  Date: 11/29/2023  Diagnosis: Closed fracture of right hip, initial encounter ([address]) [S72.001A]  Closed right hip fracture, initial encounter ([address]) [S72.001A] Closed displaced fracture of right femoral neck ([address])  Procedure(s) (LRB):  RIGHT TOTAL HIP/ DEPUY AWARE/ ;anterior APPROACH (Right) 1 Day Post-Op  Precautions:                      ASSESSMENT:  Patient continues to benefit from skilled PT services and is progressing towards goals. She was able to manage her bed mobility and ambulate with RW with supervision only for safety, tolerating weight on the RLE with minimal discomfort. She asc/desc 4 steps with railing and cane without difficulty, as well.     Reviewed activity recommendations and restrictions for home and patient will have her  and daughter at home available to
2021 Apr 4;101(4):jxlw124. doi: 10.1093/ptj/bzte655. PMID: 50478753. [address] [facility],[address] Floor, Dionicio BABB, Nellie Zamora, Licha BULLARD, Jaorn CHANEL. Activity Measure for Post-Acute Care \"6-Clicks\" Basic Mobility Scores Predict Discharge Destination After Acute Care Hospitalization in Select Patient Groups: A Retrospective, Observational Study. Arch Rehabil Res Clin Transl. 2022 Jul 16;4(3):652006. doi: 10.1016/j.arrct. 9081.388730. PMID: 04503606; PMCID: [id number]. 4. Hernan Rivera, Coster W, Aminta P. AM-PAC Short Forms Manual 4.0. Revised 2/2020.                                                                                                                                                                                                                                Pain Rating:  3/10   Pain Intervention(s):   ice, rest, and repositioning    Activity Tolerance:   Good    After treatment:   Patient left in no apparent distress in bed, Call bell within reach, Caregiver / family present, and Side rails x3    COMMUNICATION/EDUCATION:   The patient's plan of care was discussed with: occupational therapist, registered nurse, and          Thank you for this referral.  Rajesh Rosen, MAXWELL  Minutes: 40

## 2023-11-29 NOTE — DISCHARGE SUMMARY
Discharge Summary       PATIENT ID: Anisa Azul  MRN: [id number]   YOB: 1945    DATE OF ADMISSION: 11/27/2023 11:04 AM    DATE OF DISCHARGE: 11/29/2023  PRIMARY CARE PROVIDER: Gorge Osler, MD     ATTENDING PHYSICIAN: Dr. Candance Ellis  DISCHARGING PROVIDER: TAYLOR Cotton - NP    To contact this individual call [phone] and ask the  to page. If unavailable ask to be transferred the Adult Hospitalist Department. CONSULTATIONS: IP CONSULT TO ORTHOPEDIC SURGERY  IP CONSULT TO CASE MANAGEMENT  IP CONSULT TO CASE MANAGEMENT    PROCEDURES/SURGERIES: Procedure(s):  RIGHT TOTAL HIP/ DEPUY AWARE/ ;anterior APPROACH    ADMITTING [facility] [address] COURSE:   Anisa Azul is a 70 y.o. female who presents with right hip pain after falling on her right hip. She denies hitting her head or any +LOC. Ms. Ronn Andrew has a past medical history of hypertension, anxiety/depression, hyperlipidemia, arthritis and Type II DM. CT scan in the ER shows acute segmental right femoral neck fracture with significant angulation and impacted; Acute impacted right symphyseal fracture. 11/29/2023  VSS  Hgb 9.4  POD#1 right total hip  Cleared by therapy for home health. Accepted by at home care. Patient ready has a rolling walker and raised toilet seat. Spouse to transport.         DISCHARGE DIAGNOSES / PLAN:      Right Hip Fracture   - patient will go to OR today for hip repair   - IV fluids   - orthosurgery consult   - pain control with IV dilaudid + Fentanyl      Hx of Hypertension   - holding norvasc and losartan , can resume when appropriate  - monitor BP, low pos operatively      Hx of Type II DM  - hold metformin now with recent contrast dye   - Accu checks and SSI      Hx of Hyperlipidemia   - continue crestor            Code status: Full        PENDING TEST RESULTS:   At the time of discharge the following test results are still pending: none    FOLLOW UP APPOINTMENTS:    Dr. Valeriy Jones

## 2023-12-08 ENCOUNTER — OFFICE VISIT (OUTPATIENT)
Age: 78
End: 2023-12-08

## 2023-12-08 VITALS
BODY MASS INDEX: 24.25 KG/M2 | DIASTOLIC BLOOD PRESSURE: 66 MMHG | RESPIRATION RATE: 16 BRPM | HEART RATE: 60 BPM | SYSTOLIC BLOOD PRESSURE: 148 MMHG | OXYGEN SATURATION: 97 % | HEIGHT: 68 IN | WEIGHT: 160 LBS | TEMPERATURE: 97.1 F

## 2023-12-08 DIAGNOSIS — S72.001A CLOSED FRACTURE OF RIGHT HIP, INITIAL ENCOUNTER (HCC): ICD-10-CM

## 2023-12-08 DIAGNOSIS — Z09 HOSPITAL DISCHARGE FOLLOW-UP: ICD-10-CM

## 2023-12-08 DIAGNOSIS — I10 BENIGN ESSENTIAL HYPERTENSION: Primary | ICD-10-CM

## 2023-12-08 RX ORDER — AMLODIPINE BESYLATE 5 MG/1
5 TABLET ORAL NIGHTLY
Qty: 90 TABLET | Refills: 1 | Status: SHIPPED | OUTPATIENT
Start: 2023-12-08

## 2023-12-08 ASSESSMENT — ENCOUNTER SYMPTOMS
CONSTIPATION: 0
ABDOMINAL PAIN: 0
VOMITING: 0
COUGH: 0
NAUSEA: 0
SHORTNESS OF BREATH: 0
BLOOD IN STOOL: 0
DIARRHEA: 0

## 2023-12-08 ASSESSMENT — PATIENT HEALTH QUESTIONNAIRE - PHQ9
SUM OF ALL RESPONSES TO PHQ QUESTIONS 1-9: 0
2. FEELING DOWN, DEPRESSED OR HOPELESS: 0
SUM OF ALL RESPONSES TO PHQ QUESTIONS 1-9: 0
SUM OF ALL RESPONSES TO PHQ QUESTIONS 1-9: 0
1. LITTLE INTEREST OR PLEASURE IN DOING THINGS: 0
SUM OF ALL RESPONSES TO PHQ9 QUESTIONS 1 & 2: 0
SUM OF ALL RESPONSES TO PHQ QUESTIONS 1-9: 0

## 2023-12-08 NOTE — PATIENT INSTRUCTIONS
Restart your amlodipine 5mg    Continue to monitor your blood pressure daily at home over the next one to two weeks, and call the office if your blood pressure is higher than 140 on top or 90 on the bottom.

## 2023-12-08 NOTE — ASSESSMENT & PLAN NOTE
BP uptrending since discharge. Will restart her amlodipine today. She will continue to check her blood pressure at home and let us know if her blood pressure remains elevated over the next couple of weeks. If so then would resume her losartan. Otherwise she will discuss when she sees Dr. Major Adames again in March.

## 2024-01-02 ENCOUNTER — HOSPITAL ENCOUNTER (OUTPATIENT)
Facility: HOSPITAL | Age: 79
Setting detail: RECURRING SERIES
Discharge: HOME | End: 2024-01-05
Payer: MEDICARE

## 2024-01-02 PROCEDURE — 97140 MANUAL THERAPY 1/> REGIONS: CPT

## 2024-01-02 PROCEDURE — 97110 THERAPEUTIC EXERCISES: CPT

## 2024-01-02 PROCEDURE — 97161 PT EVAL LOW COMPLEX 20 MIN: CPT

## 2024-01-02 NOTE — THERAPY EVALUATION
functional goals.  The manual therapy interventions were performed at a separate and distinct time from the therapeutic activities interventions . (see flow sheet as applicable)    Details if applicable:    PROM right hip flexion and abduction  Manual stretch R adductors  Scar mobs  TPR R gluteus medius and piriformis muscles                  28 28    Total Total         Modalities Rationale:     decrease inflammation and decrease pain to improve patient's ability to progress to PLOF and address remaining functional goals.       min [] Estim Unattended,             type/location:       []  w/ice    []  w/heat          min [] Estim Attended,             type/location:       []  w/ice   []  w/heat         []  w/US   []  TENS insruct                min []  Mechanical Traction,        type/lbs:        []  pro      []  sup           []  int       []  cont            []  before manual           []  after manual     min []  Ultrasound,         settings/location:     Home min  unbilled []  Ice     []  Heat            location/position:             min []  Vasopneumatic Device,      press/temp:   pre-treatment girth :    post-treatment girth :    measured at (landmark       location) :   If using vaso (only need to measure limb vaso being performed on)        min []  Other:        Skin assessment post-treatment (if applicable):    [x]  intact    []  redness- no adverse reaction                 []redness - adverse reaction:          [x]  Patient Education billed concurrently with other procedures   [x] Review HEP    [] Progressed/Changed HEP, detail:    [] Other detail:         Pain Level at end of session (0-10 scale): 0    Plan of Care / Statement of Necessity for Physical Therapy Services     Assessment / key information:  Patient presents status post right THR.  Has decreased ROM, decreased strength, altered gait pattern and impaired function.  Should benefit from physical therapy to improve all of these

## 2024-01-04 ENCOUNTER — HOSPITAL ENCOUNTER (OUTPATIENT)
Facility: HOSPITAL | Age: 79
Setting detail: RECURRING SERIES
Discharge: HOME | End: 2024-01-07
Payer: MEDICARE

## 2024-01-04 PROCEDURE — 97140 MANUAL THERAPY 1/> REGIONS: CPT

## 2024-01-04 PROCEDURE — 97110 THERAPEUTIC EXERCISES: CPT

## 2024-01-04 NOTE — PROGRESS NOTES
PHYSICAL THERAPY - MEDICARE DAILY TREATMENT NOTE (updated 3/23)      Date: 2024          Patient Name:  Destinee Angulo :  1945   Medical   Diagnosis:  No admission diagnoses are documented for this encounter. Treatment Diagnosis:  M25.551  RIGHT HIP PAIN    Referral Source:  Yanelis Corinna L,* Insurance:   Payor: MEDICARE / Plan: MEDICARE PART A AND B / Product Type: *No Product type* /                     Patient  verified yes     Visit #   Current  / Total 2 24   Time   In / Out 10:00 am 10:45 am   Total Treatment Time 45   Total Timed Codes 40   1:1 Treatment Time 40      [facility] Totals Reminder:  bill using total billable   min of TIMED therapeutic procedures and modalities.   8-22 min = 1 unit; 23-37 min = 2 units; 38-52 min = 3 units; 53-67 min = 4 units; 68-82 min = 5 units            SUBJECTIVE    Pain Level (0-10 scale): 2    Any medication changes, allergies to medications, adverse drug reactions, diagnosis change, or new procedure performed?: [x] No    [] Yes (see summary sheet for update)  Medications: Verified on Patient Summary List    Subjective functional status/changes:     \"It's sore.\"    OBJECTIVE      Therapeutic Procedures:  Tx Min Billable or 1:1 Min (if diff from Tx Min) Procedure, Rationale, Specifics   15  43836 Therapeutic Exercise (timed):  increase ROM, strength, coordination, balance, and proprioception to improve patient's ability to progress to PLOF and address remaining functional goals. (see flow sheet as applicable)     Details if applicable:     25  12713 Manual Therapy (timed):  decrease pain, increase ROM, and increase tissue extensibility to improve patient's ability to progress to PLOF and address remaining functional goals.  The manual therapy interventions were performed at a separate and distinct time from the therapeutic activities interventions . (see flow sheet as applicable)     Details if applicable:    PROM right hip flexion and abduction  Manual

## 2024-01-09 ENCOUNTER — HOSPITAL ENCOUNTER (OUTPATIENT)
Facility: HOSPITAL | Age: 79
Setting detail: RECURRING SERIES
Discharge: HOME | End: 2024-01-12
Payer: MEDICARE

## 2024-01-09 PROCEDURE — 97110 THERAPEUTIC EXERCISES: CPT

## 2024-01-09 PROCEDURE — 97140 MANUAL THERAPY 1/> REGIONS: CPT

## 2024-01-09 NOTE — PROGRESS NOTES
PHYSICAL THERAPY - MEDICARE DAILY TREATMENT NOTE (updated 3/23)      Date: 2024          Patient Name:  Destinee Angulo :  1945   Medical   Diagnosis:  No admission diagnoses are documented for this encounter. Treatment Diagnosis:  M25.551  RIGHT HIP PAIN    Referral Source:  Yanelis Corinna L,* Insurance:   Payor: MEDICARE / Plan: MEDICARE PART A AND B / Product Type: *No Product type* /                     Patient  verified yes     Visit #   Current  / Total 3 24   Time   In / Out 10:00 am 10:50 am   Total Treatment Time 50   Total Timed Codes 40   1:1 Treatment Time 30      [facility] Totals Reminder:  bill using total billable   min of TIMED therapeutic procedures and modalities.   8-22 min = 1 unit; 23-37 min = 2 units; 38-52 min = 3 units; 53-67 min = 4 units; 68-82 min = 5 units            SUBJECTIVE    Pain Level (0-10 scale): 2    Any medication changes, allergies to medications, adverse drug reactions, diagnosis change, or new procedure performed?: [x] No    [] Yes (see summary sheet for update)  Medications: Verified on Patient Summary List    Subjective functional status/changes:     \"It's been hurting in the front.\"    OBJECTIVE      Therapeutic Procedures:  Tx Min Billable or 1:1 Min (if diff from Tx Min) Procedure, Rationale, Specifics   20 10 75364 Therapeutic Exercise (timed):  increase ROM, strength, coordination, balance, and proprioception to improve patient's ability to progress to PLOF and address remaining functional goals. (see flow sheet as applicable)     Details if applicable:      85215 Manual Therapy (timed):  decrease pain, increase ROM, and increase tissue extensibility to improve patient's ability to progress to PLOF and address remaining functional goals.  The manual therapy interventions were performed at a separate and distinct time from the therapeutic activities interventions . (see flow sheet as applicable)     Details if applicable:    PROM right hip

## 2024-01-11 ENCOUNTER — APPOINTMENT (OUTPATIENT)
Facility: HOSPITAL | Age: 79
End: 2024-01-11
Payer: MEDICARE

## 2024-01-12 ENCOUNTER — HOSPITAL ENCOUNTER (OUTPATIENT)
Facility: HOSPITAL | Age: 79
Setting detail: RECURRING SERIES
Discharge: HOME | End: 2024-01-15
Payer: MEDICARE

## 2024-01-12 PROCEDURE — 97110 THERAPEUTIC EXERCISES: CPT

## 2024-01-12 PROCEDURE — 97140 MANUAL THERAPY 1/> REGIONS: CPT

## 2024-01-12 NOTE — PROGRESS NOTES
if applicable:    PROM right hip flexion and abduction  Manual stretch R adductors  Scar mobs  TPR R gluteus medius and piriformis muscles  Manual hamstring stretch                  30 20    Total Total         Modalities Rationale:     decrease inflammation and decrease pain to improve patient's ability to progress to PLOF and address remaining functional goals.       min [] Estim Unattended,             type/location:       []  w/ice    []  w/heat        min [] Estim Attended,             type/location:       []  w/ice   []  w/heat         []  w/US   []  TENS insruct            min []  Mechanical Traction,        type/lbs:        []  pro      []  sup           []  int       []  cont            []  before manual           []  after manual    8 min [x]  Ultrasound,         settings/location:     1.1 w/cm2/left pes anserine    10 min  unbilled [x]  Ice     []  Heat            location/position:  R hip/L knee         min []  Vasopneumatic Device,      press/temp:   pre-treatment girth :    post-treatment girth :    measured at (landmark       location) :   If using vaso (only need to measure limb vaso being performed on)        min []  Other:      Skin assessment post-treatment (if applicable):    [x]  intact    []  redness- no adverse reaction                 []redness - adverse reaction:          [x]  Patient Education billed concurrently with other procedures   [x] Review HEP    [] Progressed/Changed HEP, detail:    [] Other detail:         Other Objective/Functional Measures    Pain Level at end of session (0-10 scale): 1      Assessment:  Left knee pain limits exercise tolerance.  Patient will continue to benefit from skilled PT / OT services to modify and progress therapeutic interventions, analyze and address functional mobility deficits, analyze and address ROM deficits, analyze and address strength deficits, analyze and address soft tissue restrictions, analyze and cue for proper movement patterns, and

## 2024-03-08 ENCOUNTER — OFFICE VISIT (OUTPATIENT)
Age: 79
End: 2024-03-08
Payer: MEDICARE

## 2024-03-08 VITALS
HEART RATE: 58 BPM | WEIGHT: 156 LBS | OXYGEN SATURATION: 99 % | BODY MASS INDEX: 26.63 KG/M2 | HEIGHT: 64 IN | SYSTOLIC BLOOD PRESSURE: 127 MMHG | RESPIRATION RATE: 16 BRPM | TEMPERATURE: 98.1 F | DIASTOLIC BLOOD PRESSURE: 65 MMHG

## 2024-03-08 DIAGNOSIS — Z00.00 ENCOUNTER FOR SUBSEQUENT ANNUAL WELLNESS VISIT (AWV) IN MEDICARE PATIENT: Primary | ICD-10-CM

## 2024-03-08 DIAGNOSIS — I10 BENIGN ESSENTIAL HYPERTENSION: ICD-10-CM

## 2024-03-08 DIAGNOSIS — R73.03 PREDIABETES: ICD-10-CM

## 2024-03-08 DIAGNOSIS — D62 ACUTE BLOOD LOSS AS CAUSE OF POSTOPERATIVE ANEMIA: ICD-10-CM

## 2024-03-08 DIAGNOSIS — E78.00 HYPERCHOLESTEREMIA: ICD-10-CM

## 2024-03-08 DIAGNOSIS — K43.9 VENTRAL HERNIA WITHOUT OBSTRUCTION OR GANGRENE: ICD-10-CM

## 2024-03-08 DIAGNOSIS — M17.10 ARTHRITIS OF KNEE: ICD-10-CM

## 2024-03-08 DIAGNOSIS — R41.3 MEMORY CHANGE: ICD-10-CM

## 2024-03-08 PROCEDURE — G8427 DOCREV CUR MEDS BY ELIG CLIN: HCPCS | Performed by: INTERNAL MEDICINE

## 2024-03-08 PROCEDURE — 99213 OFFICE O/P EST LOW 20 MIN: CPT | Performed by: INTERNAL MEDICINE

## 2024-03-08 PROCEDURE — 1090F PRES/ABSN URINE INCON ASSESS: CPT | Performed by: INTERNAL MEDICINE

## 2024-03-08 PROCEDURE — 3074F SYST BP LT 130 MM HG: CPT | Performed by: INTERNAL MEDICINE

## 2024-03-08 PROCEDURE — G8484 FLU IMMUNIZE NO ADMIN: HCPCS | Performed by: INTERNAL MEDICINE

## 2024-03-08 PROCEDURE — 3078F DIAST BP <80 MM HG: CPT | Performed by: INTERNAL MEDICINE

## 2024-03-08 PROCEDURE — G0439 PPPS, SUBSEQ VISIT: HCPCS | Performed by: INTERNAL MEDICINE

## 2024-03-08 PROCEDURE — 1123F ACP DISCUSS/DSCN MKR DOCD: CPT | Performed by: INTERNAL MEDICINE

## 2024-03-08 PROCEDURE — 1036F TOBACCO NON-USER: CPT | Performed by: INTERNAL MEDICINE

## 2024-03-08 PROCEDURE — G8399 PT W/DXA RESULTS DOCUMENT: HCPCS | Performed by: INTERNAL MEDICINE

## 2024-03-08 PROCEDURE — G8419 CALC BMI OUT NRM PARAM NOF/U: HCPCS | Performed by: INTERNAL MEDICINE

## 2024-03-08 RX ORDER — LOSARTAN POTASSIUM 100 MG/1
100 TABLET ORAL DAILY
COMMUNITY

## 2024-03-08 ASSESSMENT — PATIENT HEALTH QUESTIONNAIRE - PHQ9
SUM OF ALL RESPONSES TO PHQ QUESTIONS 1-9: 2
SUM OF ALL RESPONSES TO PHQ9 QUESTIONS 1 & 2: 2
SUM OF ALL RESPONSES TO PHQ QUESTIONS 1-9: 2
2. FEELING DOWN, DEPRESSED OR HOPELESS: 2
SUM OF ALL RESPONSES TO PHQ QUESTIONS 1-9: 2
1. LITTLE INTEREST OR PLEASURE IN DOING THINGS: 0
SUM OF ALL RESPONSES TO PHQ QUESTIONS 1-9: 2

## 2024-03-08 ASSESSMENT — LIFESTYLE VARIABLES
HOW OFTEN DO YOU HAVE A DRINK CONTAINING ALCOHOL: 2-4 TIMES A MONTH
HOW MANY STANDARD DRINKS CONTAINING ALCOHOL DO YOU HAVE ON A TYPICAL DAY: 1 OR 2

## 2024-03-08 NOTE — ASSESSMENT & PLAN NOTE
S/p TKR on the left early 2023  Still has some aching and reduced mobility  Now still recovering from R hip fracture and repair  Finished PT

## 2024-03-08 NOTE — ASSESSMENT & PLAN NOTE
Lab Results   Component Value Date    LDLCALC 78 04/03/2023        Tolerating statin therapy, plan to continue current regimen pending lab results  Recheck labs to assess for response to medication, whether LDL is at target, and monitor for side effects

## 2024-03-08 NOTE — PROGRESS NOTES
Chief Complaint   Patient presents with    Medicare AWV    Hernia     Patient has been having pain     Blood pressure 127/65, pulse 58, temperature 98.1 °F (36.7 °C), temperature source Oral, resp. rate 16, height 1.631 m (5' 4.2\"), weight 70.8 kg (156 lb), SpO2 99 %.    
release tablet TAKE 1 TABLET BY MOUTH TWICE DAILY WITH MEALS Yes Aparna Cortez MD   rosuvastatin (CRESTOR) 20 MG tablet TAKE 1 TABLET BY MOUTH EVERY NIGHT Yes Aparna Cortez MD   Multiple Vitamins-Minerals (PRESERVISION AREDS 2 PO) Take 1 tablet by mouth 2 times daily Yes Provider, MD Cecy   acetaminophen (TYLENOL) 500 MG tablet Take by mouth every 4 hours as needed for Pain or Fever Yes Automatic Reconciliation, Ar   Cholecalciferol 50 MCG (2000 UT) CAPS Take by mouth every evening Yes Automatic Reconciliation, Ar       CareTeam (Including outside providers/suppliers regularly involved in providing care):   Patient Care Team:  Aparna Cortez MD as PCP - General  Aparna Cortez MD as PCP - Empaneled Provider     Reviewed and updated this visit:  Tobacco  Allergies  Meds  Problems  Med Hx  Surg Hx  Soc Hx  Fam Hx

## 2024-03-08 NOTE — ASSESSMENT & PLAN NOTE
2 ventral hernias, has consulted with with Dr Puente about repair and she is considering it as they are more noticeable lately

## 2024-03-08 NOTE — ASSESSMENT & PLAN NOTE
Hemoglobin A1C   Date Value Ref Range Status   08/04/2023 5.1 4.0 - 5.6 % Final     Comment:     NEW METHOD  PLEASE NOTE NEW REFERENCE RANGE  (NOTE)  HbA1C Interpretive Ranges  <5.7              Normal  5.7 - 6.4         Consider Prediabetes  >6.5              Consider Diabetes         No hypoglycemia or other concerning side effect with current regimen, plan to continue  Continue watching carbs in the diet and regular exercise as able  Ongoing q6mo lab monitoring to assess control and guide further management    Will reduce to one tab daily if still normal range

## 2024-03-08 NOTE — ASSESSMENT & PLAN NOTE
She notes some mild memory changes and has family history of dementia   MMSE last year was 28/30  Discussed option to have more thorough eval with neuropsych/ get neuro baseline

## 2024-03-16 LAB
ALBUMIN SERPL-MCNC: 4.7 G/DL (ref 3.8–4.8)
ALBUMIN/CREAT UR: 54 MG/G CREAT (ref 0–29)
ALBUMIN/GLOB SERPL: 1.7 {RATIO} (ref 1.2–2.2)
ALP SERPL-CCNC: 66 IU/L (ref 44–121)
ALT SERPL-CCNC: 15 IU/L (ref 0–32)
AST SERPL-CCNC: 21 IU/L (ref 0–40)
BASOPHILS # BLD AUTO: 0.1 X10E3/UL (ref 0–0.2)
BASOPHILS NFR BLD AUTO: 1 %
BILIRUB SERPL-MCNC: 0.4 MG/DL (ref 0–1.2)
BUN SERPL-MCNC: 19 MG/DL (ref 8–27)
BUN/CREAT SERPL: 31 (ref 12–28)
CALCIUM SERPL-MCNC: 10 MG/DL (ref 8.7–10.3)
CHLORIDE SERPL-SCNC: 103 MMOL/L (ref 96–106)
CHOLEST SERPL-MCNC: 174 MG/DL (ref 100–199)
CO2 SERPL-SCNC: 24 MMOL/L (ref 20–29)
CREAT SERPL-MCNC: 0.61 MG/DL (ref 0.57–1)
CREAT UR-MCNC: 116.4 MG/DL
EGFRCR SERPLBLD CKD-EPI 2021: 93 ML/MIN/1.73
EOSINOPHIL # BLD AUTO: 0.3 X10E3/UL (ref 0–0.4)
EOSINOPHIL NFR BLD AUTO: 6 %
ERYTHROCYTE [DISTWIDTH] IN BLOOD BY AUTOMATED COUNT: 12.8 % (ref 11.7–15.4)
GLOBULIN SER CALC-MCNC: 2.7 G/DL (ref 1.5–4.5)
GLUCOSE SERPL-MCNC: 99 MG/DL (ref 70–99)
HBA1C MFR BLD: 5.7 % (ref 4.8–5.6)
HCT VFR BLD AUTO: 40.3 % (ref 34–46.6)
HDLC SERPL-MCNC: 66 MG/DL
HGB BLD-MCNC: 13.2 G/DL (ref 11.1–15.9)
IMM GRANULOCYTES # BLD AUTO: 0 X10E3/UL (ref 0–0.1)
IMM GRANULOCYTES NFR BLD AUTO: 0 %
LDLC SERPL CALC-MCNC: 88 MG/DL (ref 0–99)
LYMPHOCYTES # BLD AUTO: 2.6 X10E3/UL (ref 0.7–3.1)
LYMPHOCYTES NFR BLD AUTO: 43 %
MCH RBC QN AUTO: 29.7 PG (ref 26.6–33)
MCHC RBC AUTO-ENTMCNC: 32.8 G/DL (ref 31.5–35.7)
MCV RBC AUTO: 91 FL (ref 79–97)
MICROALBUMIN UR-MCNC: 62.5 UG/ML
MONOCYTES # BLD AUTO: 0.4 X10E3/UL (ref 0.1–0.9)
MONOCYTES NFR BLD AUTO: 6 %
NEUTROPHILS # BLD AUTO: 2.6 X10E3/UL (ref 1.4–7)
NEUTROPHILS NFR BLD AUTO: 44 %
PLATELET # BLD AUTO: 227 X10E3/UL (ref 150–450)
POTASSIUM SERPL-SCNC: 3.8 MMOL/L (ref 3.5–5.2)
PROT SERPL-MCNC: 7.4 G/DL (ref 6–8.5)
RBC # BLD AUTO: 4.44 X10E6/UL (ref 3.77–5.28)
SODIUM SERPL-SCNC: 142 MMOL/L (ref 134–144)
TRIGL SERPL-MCNC: 115 MG/DL (ref 0–149)
VLDLC SERPL CALC-MCNC: 20 MG/DL (ref 5–40)
WBC # BLD AUTO: 6 X10E3/UL (ref 3.4–10.8)

## 2024-05-24 DIAGNOSIS — I10 BENIGN ESSENTIAL HYPERTENSION: Primary | ICD-10-CM

## 2024-05-24 RX ORDER — LOSARTAN POTASSIUM 100 MG/1
100 TABLET ORAL DAILY
Qty: 90 TABLET | Refills: 3 | Status: SHIPPED | OUTPATIENT
Start: 2024-05-24

## 2024-05-31 ENCOUNTER — TRANSCRIBE ORDERS (OUTPATIENT)
Facility: HOSPITAL | Age: 79
End: 2024-05-31

## 2024-05-31 DIAGNOSIS — Z12.31 ENCOUNTER FOR SCREENING MAMMOGRAM FOR MALIGNANT NEOPLASM OF BREAST: Primary | ICD-10-CM

## 2024-06-21 ENCOUNTER — HOSPITAL ENCOUNTER (OUTPATIENT)
Facility: HOSPITAL | Age: 79
End: 2024-06-21
Payer: MEDICARE

## 2024-06-21 ENCOUNTER — ANCILLARY ORDERS (OUTPATIENT)
Facility: HOSPITAL | Age: 79
End: 2024-06-21

## 2024-06-21 VITALS — WEIGHT: 150 LBS | BODY MASS INDEX: 24.99 KG/M2 | HEIGHT: 65 IN

## 2024-06-21 DIAGNOSIS — Z12.31 ENCOUNTER FOR SCREENING MAMMOGRAM FOR MALIGNANT NEOPLASM OF BREAST: Primary | ICD-10-CM

## 2024-06-21 DIAGNOSIS — R92.8 ABNORMAL MAMMOGRAM OF LEFT BREAST: ICD-10-CM

## 2024-06-21 DIAGNOSIS — Z12.31 ENCOUNTER FOR SCREENING MAMMOGRAM FOR MALIGNANT NEOPLASM OF BREAST: ICD-10-CM

## 2024-06-21 PROCEDURE — 77063 BREAST TOMOSYNTHESIS BI: CPT

## 2024-06-24 ENCOUNTER — TRANSCRIBE ORDERS (OUTPATIENT)
Facility: HOSPITAL | Age: 79
End: 2024-06-24

## 2024-06-24 DIAGNOSIS — R92.8 ABNORMAL MAMMOGRAM OF LEFT BREAST: Primary | ICD-10-CM

## 2024-07-02 ENCOUNTER — HOSPITAL ENCOUNTER (OUTPATIENT)
Facility: HOSPITAL | Age: 79
Discharge: HOME | End: 2024-07-05
Payer: MEDICARE

## 2024-07-02 ENCOUNTER — TRANSCRIBE ORDERS (OUTPATIENT)
Facility: HOSPITAL | Age: 79
End: 2024-07-02

## 2024-07-02 VITALS — BODY MASS INDEX: 24.99 KG/M2 | WEIGHT: 150 LBS | HEIGHT: 65 IN

## 2024-07-02 DIAGNOSIS — Z12.31 VISIT FOR SCREENING MAMMOGRAM: Primary | ICD-10-CM

## 2024-07-02 DIAGNOSIS — R92.8 ABNORMAL MAMMOGRAM OF LEFT BREAST: ICD-10-CM

## 2024-07-02 PROCEDURE — 76642 ULTRASOUND BREAST LIMITED: CPT

## 2024-07-02 PROCEDURE — G0279 TOMOSYNTHESIS, MAMMO: HCPCS

## 2024-07-19 DIAGNOSIS — I10 BENIGN ESSENTIAL HYPERTENSION: ICD-10-CM

## 2024-07-19 RX ORDER — AMLODIPINE BESYLATE 5 MG/1
5 TABLET ORAL NIGHTLY
Qty: 90 TABLET | Refills: 0 | Status: SHIPPED | OUTPATIENT
Start: 2024-07-19

## 2024-09-13 ENCOUNTER — OFFICE VISIT (OUTPATIENT)
Age: 79
End: 2024-09-13
Payer: MEDICARE

## 2024-09-13 VITALS
HEART RATE: 58 BPM | DIASTOLIC BLOOD PRESSURE: 70 MMHG | SYSTOLIC BLOOD PRESSURE: 125 MMHG | OXYGEN SATURATION: 97 % | HEIGHT: 65 IN | WEIGHT: 149 LBS | TEMPERATURE: 97.9 F | RESPIRATION RATE: 16 BRPM | BODY MASS INDEX: 24.83 KG/M2

## 2024-09-13 DIAGNOSIS — R73.03 PREDIABETES: Primary | ICD-10-CM

## 2024-09-13 DIAGNOSIS — R30.0 DYSURIA: ICD-10-CM

## 2024-09-13 DIAGNOSIS — I10 BENIGN ESSENTIAL HYPERTENSION: ICD-10-CM

## 2024-09-13 DIAGNOSIS — E78.00 HYPERCHOLESTEREMIA: ICD-10-CM

## 2024-09-13 PROCEDURE — G8427 DOCREV CUR MEDS BY ELIG CLIN: HCPCS | Performed by: INTERNAL MEDICINE

## 2024-09-13 PROCEDURE — G8420 CALC BMI NORM PARAMETERS: HCPCS | Performed by: INTERNAL MEDICINE

## 2024-09-13 PROCEDURE — 99214 OFFICE O/P EST MOD 30 MIN: CPT | Performed by: INTERNAL MEDICINE

## 2024-09-13 PROCEDURE — 3078F DIAST BP <80 MM HG: CPT | Performed by: INTERNAL MEDICINE

## 2024-09-13 PROCEDURE — 1036F TOBACCO NON-USER: CPT | Performed by: INTERNAL MEDICINE

## 2024-09-13 PROCEDURE — 3074F SYST BP LT 130 MM HG: CPT | Performed by: INTERNAL MEDICINE

## 2024-09-13 PROCEDURE — 1090F PRES/ABSN URINE INCON ASSESS: CPT | Performed by: INTERNAL MEDICINE

## 2024-09-13 PROCEDURE — G8399 PT W/DXA RESULTS DOCUMENT: HCPCS | Performed by: INTERNAL MEDICINE

## 2024-09-13 PROCEDURE — 1123F ACP DISCUSS/DSCN MKR DOCD: CPT | Performed by: INTERNAL MEDICINE

## 2024-09-13 SDOH — ECONOMIC STABILITY: FOOD INSECURITY: WITHIN THE PAST 12 MONTHS, THE FOOD YOU BOUGHT JUST DIDN'T LAST AND YOU DIDN'T HAVE MONEY TO GET MORE.: NEVER TRUE

## 2024-09-13 SDOH — ECONOMIC STABILITY: FOOD INSECURITY: WITHIN THE PAST 12 MONTHS, YOU WORRIED THAT YOUR FOOD WOULD RUN OUT BEFORE YOU GOT MONEY TO BUY MORE.: NEVER TRUE

## 2024-09-13 SDOH — ECONOMIC STABILITY: INCOME INSECURITY: HOW HARD IS IT FOR YOU TO PAY FOR THE VERY BASICS LIKE FOOD, HOUSING, MEDICAL CARE, AND HEATING?: NOT HARD AT ALL

## 2024-09-13 ASSESSMENT — ENCOUNTER SYMPTOMS
SHORTNESS OF BREATH: 0
COUGH: 0
ABDOMINAL PAIN: 0

## 2024-10-05 LAB
APPEARANCE UR: ABNORMAL
BACTERIA #/AREA URNS HPF: ABNORMAL /[HPF]
BACTERIA UR CULT: ABNORMAL
BILIRUB UR QL STRIP: NEGATIVE
BUN SERPL-MCNC: 16 MG/DL (ref 8–27)
BUN/CREAT SERPL: 26 (ref 12–28)
CALCIUM SERPL-MCNC: 10.1 MG/DL (ref 8.7–10.3)
CASTS URNS QL MICRO: ABNORMAL /LPF
CHLORIDE SERPL-SCNC: 104 MMOL/L (ref 96–106)
CO2 SERPL-SCNC: 22 MMOL/L (ref 20–29)
COLOR UR: YELLOW
CREAT SERPL-MCNC: 0.61 MG/DL (ref 0.57–1)
EGFRCR SERPLBLD CKD-EPI 2021: 93 ML/MIN/1.73
EPI CELLS #/AREA URNS HPF: ABNORMAL /HPF (ref 0–10)
GLUCOSE SERPL-MCNC: 113 MG/DL (ref 70–99)
GLUCOSE UR QL STRIP: NEGATIVE
HBA1C MFR BLD: 5.5 % (ref 4.8–5.6)
HGB UR QL STRIP: NEGATIVE
KETONES UR QL STRIP: NEGATIVE
LDLC SERPL DIRECT ASSAY-MCNC: 79 MG/DL (ref 0–99)
LEUKOCYTE ESTERASE UR QL STRIP: ABNORMAL
MICRO URNS: ABNORMAL
NITRITE UR QL STRIP: POSITIVE
PH UR STRIP: 6.5 [PH] (ref 5–7.5)
POTASSIUM SERPL-SCNC: 4.1 MMOL/L (ref 3.5–5.2)
PROT UR QL STRIP: NEGATIVE
RBC #/AREA URNS HPF: ABNORMAL /HPF (ref 0–2)
SODIUM SERPL-SCNC: 141 MMOL/L (ref 134–144)
SP GR UR STRIP: 1.01 (ref 1–1.03)
UROBILINOGEN UR STRIP-MCNC: 0.2 MG/DL (ref 0.2–1)
WBC #/AREA URNS HPF: >30 /HPF (ref 0–5)

## 2024-10-07 DIAGNOSIS — N30.90 CYSTITIS: Primary | ICD-10-CM

## 2024-10-07 RX ORDER — METFORMIN HCL 500 MG
500 TABLET, EXTENDED RELEASE 24 HR ORAL
Qty: 90 TABLET | Refills: 3
Start: 2024-10-07

## 2024-10-07 RX ORDER — NITROFURANTOIN 25; 75 MG/1; MG/1
100 CAPSULE ORAL 2 TIMES DAILY
Qty: 10 CAPSULE | Refills: 0 | Status: SHIPPED | OUTPATIENT
Start: 2024-10-07 | End: 2024-10-12

## 2024-10-10 ENCOUNTER — TELEPHONE (OUTPATIENT)
Age: 79
End: 2024-10-10

## 2024-10-10 NOTE — TELEPHONE ENCOUNTER
Jorden from [facility] pharmacy called.  He wanted to verify that the directions for patient's Metformin changed from 2 tablets daily, to one tablet daily.  They did not receive refill sent on 10/7/24.    [facility] - [phone]

## 2024-10-17 DIAGNOSIS — I10 BENIGN ESSENTIAL HYPERTENSION: ICD-10-CM

## 2024-10-17 RX ORDER — METFORMIN HCL 500 MG
500 TABLET, EXTENDED RELEASE 24 HR ORAL 2 TIMES DAILY WITH MEALS
Qty: 180 TABLET | OUTPATIENT
Start: 2024-10-17

## 2024-10-17 RX ORDER — ROSUVASTATIN CALCIUM 20 MG/1
TABLET, COATED ORAL
Qty: 90 TABLET | Refills: 3 | Status: SHIPPED | OUTPATIENT
Start: 2024-10-17

## 2024-10-17 NOTE — TELEPHONE ENCOUNTER
Chief Complaint   Patient presents with    Medication Refill     Last Appointment with Dr. Aparna Cortez:  9/13/2024   Future Appointments   Date Time Provider Department Center   3/13/2025  1:00 PM Aparna Cortez MD [facility]   6/23/2025 10:00 AM [address]

## 2024-10-21 RX ORDER — AMLODIPINE BESYLATE 5 MG/1
5 TABLET ORAL NIGHTLY
Qty: 90 TABLET | Refills: 3 | Status: SHIPPED | OUTPATIENT
Start: 2024-10-21

## 2024-10-21 RX ORDER — METFORMIN HYDROCHLORIDE 500 MG/1
500 TABLET, EXTENDED RELEASE ORAL
Qty: 90 TABLET | Refills: 1 | Status: SHIPPED | OUTPATIENT
Start: 2024-10-21

## 2025-03-11 ENCOUNTER — TELEPHONE (OUTPATIENT)
Age: 80
End: 2025-03-11

## 2025-03-11 NOTE — TELEPHONE ENCOUNTER
Attempted to contact patient regarding upcoming Medicare wellness appointment and completion of HRA questionnaire. LVM for patient to please return call at  [phone], mcm sent as well.

## 2025-03-12 ENCOUNTER — TELEPHONE (OUTPATIENT)
Age: 80
End: 2025-03-12

## 2025-03-12 NOTE — TELEPHONE ENCOUNTER
Attempted to contact patient x 2 regarding upcoming Medicare wellness appointment and completion of HRA questionnaire. LVM for patient to please return call or complete via .

## 2025-03-13 ENCOUNTER — OFFICE VISIT (OUTPATIENT)
Age: 80
End: 2025-03-13
Payer: MEDICARE

## 2025-03-13 VITALS
HEIGHT: 65 IN | HEART RATE: 57 BPM | DIASTOLIC BLOOD PRESSURE: 68 MMHG | BODY MASS INDEX: 25.72 KG/M2 | RESPIRATION RATE: 16 BRPM | OXYGEN SATURATION: 97 % | SYSTOLIC BLOOD PRESSURE: 152 MMHG | TEMPERATURE: 98.1 F | WEIGHT: 154.4 LBS

## 2025-03-13 DIAGNOSIS — G89.29 CHRONIC RIGHT-SIDED THORACIC BACK PAIN: ICD-10-CM

## 2025-03-13 DIAGNOSIS — E78.00 HYPERCHOLESTEREMIA: ICD-10-CM

## 2025-03-13 DIAGNOSIS — Z12.11 COLON CANCER SCREENING: ICD-10-CM

## 2025-03-13 DIAGNOSIS — I10 BENIGN ESSENTIAL HYPERTENSION: ICD-10-CM

## 2025-03-13 DIAGNOSIS — I65.21 CAROTID STENOSIS, ASYMPTOMATIC, RIGHT: ICD-10-CM

## 2025-03-13 DIAGNOSIS — R73.03 PREDIABETES: ICD-10-CM

## 2025-03-13 DIAGNOSIS — M54.6 CHRONIC RIGHT-SIDED THORACIC BACK PAIN: ICD-10-CM

## 2025-03-13 DIAGNOSIS — Z00.00 MEDICARE ANNUAL WELLNESS VISIT, SUBSEQUENT: Primary | ICD-10-CM

## 2025-03-13 PROCEDURE — 99213 OFFICE O/P EST LOW 20 MIN: CPT | Performed by: INTERNAL MEDICINE

## 2025-03-13 SDOH — ECONOMIC STABILITY: FOOD INSECURITY: WITHIN THE PAST 12 MONTHS, THE FOOD YOU BOUGHT JUST DIDN'T LAST AND YOU DIDN'T HAVE MONEY TO GET MORE.: NEVER TRUE

## 2025-03-13 SDOH — ECONOMIC STABILITY: FOOD INSECURITY: WITHIN THE PAST 12 MONTHS, YOU WORRIED THAT YOUR FOOD WOULD RUN OUT BEFORE YOU GOT MONEY TO BUY MORE.: NEVER TRUE

## 2025-03-13 ASSESSMENT — LIFESTYLE VARIABLES
HOW MANY STANDARD DRINKS CONTAINING ALCOHOL DO YOU HAVE ON A TYPICAL DAY: 1 OR 2
HOW OFTEN DO YOU HAVE A DRINK CONTAINING ALCOHOL: 2-3 TIMES A WEEK

## 2025-03-13 ASSESSMENT — PATIENT HEALTH QUESTIONNAIRE - PHQ9
1. LITTLE INTEREST OR PLEASURE IN DOING THINGS: SEVERAL DAYS
SUM OF ALL RESPONSES TO PHQ QUESTIONS 1-9: 2
2. FEELING DOWN, DEPRESSED OR HOPELESS: SEVERAL DAYS
SUM OF ALL RESPONSES TO PHQ QUESTIONS 1-9: 2

## 2025-03-13 NOTE — ASSESSMENT & PLAN NOTE
Lab Results   Component Value Date    LDL 88 03/14/2024    LDLDIRECT 79 10/01/2024       Tolerating statin therapy, plan to continue current regimen pending lab results  Recheck labs to assess for response to medication, whether LDL is at target, and monitor for side effects

## 2025-03-13 NOTE — ASSESSMENT & PLAN NOTE
Above goal today  Advised on home monitoring of BP and keep a log  Update me with readings via [employer] in 2-3 weeks

## 2025-03-13 NOTE — PROGRESS NOTES
Medicare Annual Wellness Visit    Destinee Angulo is here for Medicare AWV    Assessment & Plan   Medicare annual wellness visit, subsequent  Prediabetes  Assessment & Plan:     Hemoglobin A1C   Date Value Ref Range Status   10/01/2024 5.5 4.8 - 5.6 % Final     Comment:              Prediabetes: 5.7 - 6.4           Diabetes: >6.4           Glycemic control for adults with diabetes: <7.0       She has lost weight since starting metformin  No hypoglycemia or other concerning side effect with current regimen, plan to continue  Continue watching carbs in the diet and regular exercise as able  Ongoing q6mo lab monitoring to assess control and guide further management    Orders:  -     Hemoglobin A1C; Future  Hypercholesteremia  Assessment & Plan:  Lab Results   Component Value Date    LDL 88 03/14/2024    LDLDIRECT 79 10/01/2024       Tolerating statin therapy, plan to continue current regimen pending lab results  Recheck labs to assess for response to medication, whether LDL is at target, and monitor for side effects  Orders:  -     Lipid Panel; Future  -     Comprehensive Metabolic Panel; Future  -     CBC with Auto Differential; Future  Benign essential hypertension  Assessment & Plan:  Above goal today  Advised on home monitoring of BP and keep a log  Update me with readings via [employer] in 2-3 weeks    Orders:  -     Comprehensive Metabolic Panel; Future  -     CBC with Auto Differential; Future  -     Albumin/Creatinine Ratio, Urine; Future  Carotid stenosis, asymptomatic, right  Assessment & Plan:   Monitored by specialist- no acute findings meriting change in the plan  S/p R carotid endarterectomy 2019, Dr Ohara     Orders:  -     Lipid Panel; Future  Chronic right-sided thoracic back pain  -     External Referral To Physical Therapy; Future  -     Urinalysis with Microscopic; Future  Colon cancer screening  -     Cologuard (Fecal DNA Colorectal Cancer Screening)     Rec trial of PT for acute on chronic R

## 2025-03-13 NOTE — ASSESSMENT & PLAN NOTE
Hemoglobin A1C   Date Value Ref Range Status   10/01/2024 5.5 4.8 - 5.6 % Final     Comment:              Prediabetes: 5.7 - 6.4           Diabetes: >6.4           Glycemic control for adults with diabetes: <7.0       She has lost weight since starting metformin  No hypoglycemia or other concerning side effect with current regimen, plan to continue  Continue watching carbs in the diet and regular exercise as able  Ongoing q6mo lab monitoring to assess control and guide further management

## 2025-03-13 NOTE — ASSESSMENT & PLAN NOTE
Monitored by specialist- no acute findings meriting change in the plan  S/p R carotid endarterectomy 2019, Dr Ohara

## 2025-03-16 LAB
ALBUMIN SERPL-MCNC: 4.7 G/DL (ref 3.8–4.8)
ALBUMIN/CREAT UR: 26 MG/G CREAT (ref 0–29)
ALP SERPL-CCNC: 58 IU/L (ref 44–121)
ALT SERPL-CCNC: 14 IU/L (ref 0–32)
APPEARANCE UR: CLEAR
AST SERPL-CCNC: 17 IU/L (ref 0–40)
BACTERIA #/AREA URNS HPF: ABNORMAL /[HPF]
BASOPHILS # BLD AUTO: 0 X10E3/UL (ref 0–0.2)
BASOPHILS NFR BLD AUTO: 1 %
BILIRUB SERPL-MCNC: 0.6 MG/DL (ref 0–1.2)
BILIRUB UR QL STRIP: NEGATIVE
BUN SERPL-MCNC: 23 MG/DL (ref 8–27)
BUN/CREAT SERPL: 31 (ref 12–28)
CALCIUM SERPL-MCNC: 9.6 MG/DL (ref 8.7–10.3)
CASTS URNS QL MICRO: ABNORMAL /LPF
CHLORIDE SERPL-SCNC: 107 MMOL/L (ref 96–106)
CHOLEST SERPL-MCNC: 163 MG/DL (ref 100–199)
CO2 SERPL-SCNC: 23 MMOL/L (ref 20–29)
COLOR UR: YELLOW
CREAT SERPL-MCNC: 0.74 MG/DL (ref 0.57–1)
CREAT UR-MCNC: 78.8 MG/DL
EGFRCR SERPLBLD CKD-EPI 2021: 83 ML/MIN/1.73
EOSINOPHIL # BLD AUTO: 0.3 X10E3/UL (ref 0–0.4)
EOSINOPHIL NFR BLD AUTO: 5 %
EPI CELLS #/AREA URNS HPF: ABNORMAL /HPF (ref 0–10)
ERYTHROCYTE [DISTWIDTH] IN BLOOD BY AUTOMATED COUNT: 12.4 % (ref 11.7–15.4)
GLOBULIN SER CALC-MCNC: 2.5 G/DL (ref 1.5–4.5)
GLUCOSE SERPL-MCNC: 100 MG/DL (ref 70–99)
GLUCOSE UR QL STRIP: NEGATIVE
HBA1C MFR BLD: 5.4 % (ref 4.8–5.6)
HCT VFR BLD AUTO: 38 % (ref 34–46.6)
HDLC SERPL-MCNC: 69 MG/DL
HGB BLD-MCNC: 12.7 G/DL (ref 11.1–15.9)
HGB UR QL STRIP: NEGATIVE
IMM GRANULOCYTES # BLD AUTO: 0 X10E3/UL (ref 0–0.1)
IMM GRANULOCYTES NFR BLD AUTO: 0 %
KETONES UR QL STRIP: NEGATIVE
LDLC SERPL CALC-MCNC: 82 MG/DL (ref 0–99)
LEUKOCYTE ESTERASE UR QL STRIP: ABNORMAL
LYMPHOCYTES # BLD AUTO: 1.9 X10E3/UL (ref 0.7–3.1)
LYMPHOCYTES NFR BLD AUTO: 37 %
MCH RBC QN AUTO: 31.1 PG (ref 26.6–33)
MCHC RBC AUTO-ENTMCNC: 33.4 G/DL (ref 31.5–35.7)
MCV RBC AUTO: 93 FL (ref 79–97)
MICRO URNS: ABNORMAL
MICROALBUMIN UR-MCNC: 20.6 UG/ML
MONOCYTES # BLD AUTO: 0.3 X10E3/UL (ref 0.1–0.9)
MONOCYTES NFR BLD AUTO: 6 %
NEUTROPHILS # BLD AUTO: 2.6 X10E3/UL (ref 1.4–7)
NEUTROPHILS NFR BLD AUTO: 51 %
NITRITE UR QL STRIP: POSITIVE
PH UR STRIP: 5.5 [PH] (ref 5–7.5)
PLATELET # BLD AUTO: 194 X10E3/UL (ref 150–450)
POTASSIUM SERPL-SCNC: 4.4 MMOL/L (ref 3.5–5.2)
PROT SERPL-MCNC: 7.2 G/DL (ref 6–8.5)
PROT UR QL STRIP: NEGATIVE
RBC # BLD AUTO: 4.08 X10E6/UL (ref 3.77–5.28)
RBC #/AREA URNS HPF: ABNORMAL /HPF (ref 0–2)
SODIUM SERPL-SCNC: 144 MMOL/L (ref 134–144)
SP GR UR STRIP: 1.02 (ref 1–1.03)
TRIGL SERPL-MCNC: 61 MG/DL (ref 0–149)
UROBILINOGEN UR STRIP-MCNC: 0.2 MG/DL (ref 0.2–1)
VLDLC SERPL CALC-MCNC: 12 MG/DL (ref 5–40)
WBC # BLD AUTO: 5.2 X10E3/UL (ref 3.4–10.8)
WBC #/AREA URNS HPF: ABNORMAL /HPF (ref 0–5)

## 2025-03-17 ENCOUNTER — RESULTS FOLLOW-UP (OUTPATIENT)
Age: 80
End: 2025-03-17

## 2025-04-04 ENCOUNTER — OFFICE VISIT (OUTPATIENT)
Age: 80
End: 2025-04-04
Payer: MEDICARE

## 2025-04-04 VITALS
WEIGHT: 157.6 LBS | DIASTOLIC BLOOD PRESSURE: 67 MMHG | TEMPERATURE: 97.8 F | SYSTOLIC BLOOD PRESSURE: 163 MMHG | HEART RATE: 50 BPM | RESPIRATION RATE: 16 BRPM | OXYGEN SATURATION: 99 % | HEIGHT: 65 IN | BODY MASS INDEX: 26.26 KG/M2

## 2025-04-04 DIAGNOSIS — H61.21 IMPACTED CERUMEN OF RIGHT EAR: Primary | ICD-10-CM

## 2025-04-04 PROCEDURE — 1160F RVW MEDS BY RX/DR IN RCRD: CPT | Performed by: NURSE PRACTITIONER

## 2025-04-04 PROCEDURE — 99213 OFFICE O/P EST LOW 20 MIN: CPT | Performed by: NURSE PRACTITIONER

## 2025-04-04 PROCEDURE — 1159F MED LIST DOCD IN RCRD: CPT | Performed by: NURSE PRACTITIONER

## 2025-04-04 PROCEDURE — 3077F SYST BP >= 140 MM HG: CPT | Performed by: NURSE PRACTITIONER

## 2025-04-04 PROCEDURE — 3078F DIAST BP <80 MM HG: CPT | Performed by: NURSE PRACTITIONER

## 2025-04-04 PROCEDURE — 1090F PRES/ABSN URINE INCON ASSESS: CPT | Performed by: NURSE PRACTITIONER

## 2025-04-04 PROCEDURE — G8427 DOCREV CUR MEDS BY ELIG CLIN: HCPCS | Performed by: NURSE PRACTITIONER

## 2025-04-04 PROCEDURE — 1126F AMNT PAIN NOTED NONE PRSNT: CPT | Performed by: NURSE PRACTITIONER

## 2025-04-04 PROCEDURE — G8399 PT W/DXA RESULTS DOCUMENT: HCPCS | Performed by: NURSE PRACTITIONER

## 2025-04-04 PROCEDURE — 1123F ACP DISCUSS/DSCN MKR DOCD: CPT | Performed by: NURSE PRACTITIONER

## 2025-04-04 PROCEDURE — 1036F TOBACCO NON-USER: CPT | Performed by: NURSE PRACTITIONER

## 2025-04-04 PROCEDURE — G8419 CALC BMI OUT NRM PARAM NOF/U: HCPCS | Performed by: NURSE PRACTITIONER

## 2025-04-04 NOTE — PROGRESS NOTES
Destinee Angulo (:  1945) is a 79 y.o. female,here for evaluation of the following chief complaint(s):  Ear Fullness  BP (!) 163/67   Pulse 50   Temp 97.8 °F (36.6 °C) (Temporal)   Resp 16   Ht 1.641 m (5' 4.61\")   Wt 71.5 kg (157 lb 9.6 oz)   SpO2 99%   BMI 26.54 kg/m²         SUBJECTIVE/OBJECTIVE:    HPI:Patient reports hearing loss of right ear starting 10 days ago after using a q tip. No ear pain.    Review of Systems   HENT:  Positive for hearing loss.        Physical Exam  Constitutional:       Appearance: Normal appearance.   HENT:      Head: Normocephalic.      Right Ear: Decreased hearing noted. There is impacted cerumen.      Left Ear: Tympanic membrane, ear canal and external ear normal.   Skin:     General: Skin is warm.   Neurological:      General: No focal deficit present.      Mental Status: She is alert and oriented to person, place, and time.   Psychiatric:         Mood and Affect: Mood normal.         Behavior: Behavior normal.          ASSESSMENT/PLAN:  1. Impacted cerumen of right ear  Cerumen removed easily with lighted curette; TM normal  Debrox as needed for impacted cerumen    --TAYLOR Obrien - NP

## 2025-04-08 ENCOUNTER — PATIENT MESSAGE (OUTPATIENT)
Age: 80
End: 2025-04-08

## 2025-04-08 DIAGNOSIS — H91.91 DECREASED HEARING OF RIGHT EAR: Primary | ICD-10-CM

## 2025-04-10 LAB — NONINV COLON CA DNA+OCC BLD SCRN STL QL: NEGATIVE

## 2025-04-11 ENCOUNTER — RESULTS FOLLOW-UP (OUTPATIENT)
Age: 80
End: 2025-04-11

## 2025-04-21 RX ORDER — METFORMIN HYDROCHLORIDE 500 MG/1
500 TABLET, EXTENDED RELEASE ORAL DAILY
Qty: 90 TABLET | Refills: 0 | Status: SHIPPED | OUTPATIENT
Start: 2025-04-21

## 2025-05-08 DIAGNOSIS — I10 BENIGN ESSENTIAL HYPERTENSION: ICD-10-CM

## 2025-05-08 RX ORDER — LOSARTAN POTASSIUM 100 MG/1
100 TABLET ORAL DAILY
Qty: 90 TABLET | Refills: 0 | Status: SHIPPED | OUTPATIENT
Start: 2025-05-08

## 2025-05-08 NOTE — TELEPHONE ENCOUNTER
Chief Complaint   Patient presents with    Medication Refill     Last Appointment with Dr. Aparna Cortez:  3/13/2025   Future Appointments   Date Time Provider Department Center   6/23/2025 10:00 AM [facility] 2 [facility]   9/18/2025  1:00 PM Aparna Cortez MD [facility] DEP       The above orders were approved via [facility] per Dr. Aparna Cortez.

## 2025-06-03 ENCOUNTER — TELEPHONE (OUTPATIENT)
Age: 80
End: 2025-06-03

## 2025-06-03 NOTE — TELEPHONE ENCOUNTER
TC from pt. Returning call requesting BP readings. Pt states she does not take BP regularly and does not write them down. Reports BP this morning as 155/67. States she will try to be better at monitoring her BP and writing them down. Advised pt to keep hydrated and call office with continuous High BP Readings. Pt understanding verbalized. Will forward to MD for review.

## 2025-06-19 ENCOUNTER — TELEPHONE (OUTPATIENT)
Age: 80
End: 2025-06-19

## 2025-06-19 NOTE — TELEPHONE ENCOUNTER
No urgent changes needed.  Will defer further decisions regarding patient's blood pressure to her PCP who will be in the office tomorrow.

## 2025-06-19 NOTE — TELEPHONE ENCOUNTER
TC from pt. Pt reports high BP reading of 168/68. Reports the lowest BP reading was on Tuesday as 138/55. Pt states she is going through a lot right now, her  is sick and some other family situations. Agreed with pt that may be affecting her BP. Pt declines dizziness, chest pain, fatigue, and headaches. States she feels her heart is pounding. States JMS is aware of this issue and is an everyday occurrence. Has had an EKG done before for this issue and was told her heart rate was high. Pt rechecked her BP and was 150/61 SC 68. Let pt know around her age we would like her BP to be around 140-130 systolic and diatsolic 80-70. Advised to continue monitoring her BP today and call back if BP continues to be elevated or if symptoms occur. Pt understanding verbalized. Will forward to MD for review.

## 2025-06-20 NOTE — TELEPHONE ENCOUNTER
Pt was called and advised of her new recommendation of increasing her amlodipine from daily to bid. Pt verbalized understanding.

## 2025-07-18 RX ORDER — METFORMIN HYDROCHLORIDE 500 MG/1
500 TABLET, EXTENDED RELEASE ORAL DAILY
Qty: 90 TABLET | Refills: 0 | Status: SHIPPED | OUTPATIENT
Start: 2025-07-18

## 2025-08-09 DIAGNOSIS — I10 BENIGN ESSENTIAL HYPERTENSION: ICD-10-CM

## 2025-08-11 RX ORDER — LOSARTAN POTASSIUM 100 MG/1
100 TABLET ORAL DAILY
Qty: 90 TABLET | Refills: 0 | Status: SHIPPED | OUTPATIENT
Start: 2025-08-11

## (undated) DEVICE — DRAIN SURG W7MMXL20CM SIL FULL PERF HUBLESS FLAT RADPQ STRP

## (undated) DEVICE — ZIPPERED TOGA, 2X LARGE: Brand: FLYTE, SURGICOOL

## (undated) DEVICE — HANDPIECE SET WITH BONE CLEANING TIP AND SUCTION TUBE: Brand: INTERPULSE

## (undated) DEVICE — Device

## (undated) DEVICE — LIQUIBAND RAPID ADHESIVE 36/CS 0.8ML: Brand: MEDLINE

## (undated) DEVICE — 3M™ STERI-DRAPE™ 2 INCISE DRAPE 2050: Brand: STERI-DRAPE™

## (undated) DEVICE — CONTAINER,SPECIMEN,4OZ,OR STRL: Brand: MEDLINE

## (undated) DEVICE — REM POLYHESIVE ADULT PATIENT RETURN ELECTRODE: Brand: VALLEYLAB

## (undated) DEVICE — STRAP,POSITIONING,KNEE/BODY,FOAM,4X60": Brand: MEDLINE

## (undated) DEVICE — WIPE 400300 MEROCEL 20PK INSTRUMENT: Brand: MEROCEL®

## (undated) DEVICE — HOOD: Brand: FLYTE

## (undated) DEVICE — SUTURE VCRL SZ 1 L36IN ABSRB UD L36MM CT-1 1/2 CIR J947H

## (undated) DEVICE — 1200 GUARD II KIT W/5MM TUBE W/O VAC TUBE: Brand: GUARDIAN

## (undated) DEVICE — STERILE POLYISOPRENE POWDER-FREE SURGICAL GLOVES: Brand: PROTEXIS

## (undated) DEVICE — CUSTOM CAST PD STR

## (undated) DEVICE — TRAY PREP DRY W/ PREM GLV 2 APPL 6 SPNG 2 UNDPD 1 OVERWRAP

## (undated) DEVICE — 4-PORT MANIFOLD: Brand: NEPTUNE 2

## (undated) DEVICE — INFECTION CONTROL KIT SYS

## (undated) DEVICE — ERASECAUTI INTERMIT TRAY: Brand: MEDLINE INDUSTRIES, INC.

## (undated) DEVICE — TOTAL JOINT - SMH: Brand: MEDLINE INDUSTRIES, INC.

## (undated) DEVICE — BANDAGE COMPR M W6INXL10YD WHT BGE VELC E MTRX HK AND LOOP

## (undated) DEVICE — SCRUB DRY SURG EZ SCRUB BRUSH PREOPERATIVE GRN

## (undated) DEVICE — DRAPE SHT 3 QTR PROXIMA 53X77 --

## (undated) DEVICE — SOLUTION IV 500ML 0.9% SOD CHL FLX CONT

## (undated) DEVICE — MASTISOL ADHESIVE LIQ 2/3ML

## (undated) DEVICE — SUTURE STRATAFIX SPRL SZ 1 L14IN ABSRB VLT L48CM CTX 1/2 SXPD2B405

## (undated) DEVICE — TIP SUCT CRV REG REDI

## (undated) DEVICE — Z DISCONTINUED USE 2425483 (LOW STOCK PER MEDLINE) TAPE UMB L18IN DIA1/8IN WHT COT NONABSORBABLE W/O NDL FOR

## (undated) DEVICE — ELECTRODE BLDE L4IN NONINSULATED EDGE

## (undated) DEVICE — APPLICATOR BNDG 1MM ADH PREMIERPRO EXOFIN

## (undated) DEVICE — SUTURE VCRL SZ 0 L27IN ABSRB UD L36MM CT-1 1/2 CIR J260H

## (undated) DEVICE — TAPE SURG W4INXL11YD 2IN PERF LINERLESS NONWOVEN MEDFIX EZ

## (undated) DEVICE — GLOVE SURG SZ 65 L12IN FNGR THK94MIL STD WHT LTX FREE

## (undated) DEVICE — DRESSING HYDROFIBER AQUACEL AG ADVANTAGE 3.5X14 IN

## (undated) DEVICE — ZIPPERED TOGA, LARGE: Brand: FLYTE

## (undated) DEVICE — SUTURE ETHBND EXCEL SZ 2 L30IN NONABSORBABLE GRN L40MM V-37 MX69G

## (undated) DEVICE — GOWN,SIRUS,NONRNF,SETINSLV,2XL,18/CS: Brand: MEDLINE

## (undated) DEVICE — SOLUTION SURG PREP 26 CC PURPREP

## (undated) DEVICE — CATH URETH INTMIT ROB 16FR FUN -- CONVERT TO ITEM 179520

## (undated) DEVICE — SUT ETHLN 2-0 18IN FS BLK --

## (undated) DEVICE — SUT PROL 6-0 18IN BV1 DA BLU --

## (undated) DEVICE — PAD BD MATTRESS 73X32 IN STD CONVOLUTED FOAM LTX FREE

## (undated) DEVICE — GLOVE SURG SZ 65 L12IN FNGR THK79MIL GRN LTX FREE

## (undated) DEVICE — STERILE POLYISOPRENE POWDER-FREE SURGICAL GLOVES WITH EMOLLIENT COATING: Brand: PROTEXIS

## (undated) DEVICE — DRAPE,U/ SHT,SPLIT,PLAS,STERIL: Brand: MEDLINE

## (undated) DEVICE — VASCULAR-RICHMOND-LF: Brand: MEDLINE INDUSTRIES, INC.

## (undated) DEVICE — SYRINGE MED 10ML LUERLOCK TIP W/O SFTY DISP

## (undated) DEVICE — YANKAUER,OPEN TIP,W/O VENT,STERILE: Brand: MEDLINE INDUSTRIES, INC.

## (undated) DEVICE — DRESSING FOAM 4X8IN DISP POSTOP MEPILEX BORD AG

## (undated) DEVICE — NEEDLE HYPO 21GA L1IN GRN S STL HUB POLYPR SHLD REG BVL

## (undated) DEVICE — SUTURE VCRL SZ 2-0 L36IN ABSRB UD L40MM CT 1/2 CIR J957H

## (undated) DEVICE — PADDING CAST SPEC 6INX4YD COT --

## (undated) DEVICE — ZIPPERED TOGA, X-LARGE: Brand: FLYTE, SURGICOOL

## (undated) DEVICE — APPLICATOR MEDICATED 26 CC SOLUTION HI LT ORNG CHLORAPREP

## (undated) DEVICE — PREP SKN CHLRAPRP APL 26ML STR --

## (undated) DEVICE — MARKER,SKIN,WI/RULER AND LABELS: Brand: MEDLINE

## (undated) DEVICE — BLADE SAW W098XL354IN THK0047IN CUT THK0047IN SAG

## (undated) DEVICE — SYRINGE 20ML LL S/C 50

## (undated) DEVICE — HYPODERMIC SAFETY NEEDLE: Brand: MAGELLAN

## (undated) DEVICE — DRAPE,EXTREMITY,89X128,STERILE: Brand: MEDLINE

## (undated) DEVICE — SUTURE MCRYL SZ 3-0 L27IN ABSRB UD L24MM PS-1 3/8 CIR PRIM Y936H

## (undated) DEVICE — MAGNETIC INSTR DRAPE 20X16: Brand: MEDLINE INDUSTRIES, INC.

## (undated) DEVICE — AGENT HEMSTAT W4XL4IN OXIDIZED REGENERATED CELOS ABSRB SFT

## (undated) DEVICE — DECANTER BAG 9": Brand: MEDLINE INDUSTRIES, INC.

## (undated) DEVICE — SOLUTION IRRIG 1000ML H2O STRL BLT

## (undated) DEVICE — SUT PROL 6-0 24IN BV1 DA BLU --

## (undated) DEVICE — STRYKER PERFORMANCE SERIES SAGITTAL BLADE: Brand: STRYKER PERFORMANCE SERIES

## (undated) DEVICE — HANDLE LT SNAP ON ULT DURABLE LENS FOR TRUMPF ALC DISPOSABLE

## (undated) DEVICE — TUBING, SUCTION, 1/4" X 12', STRAIGHT: Brand: MEDLINE

## (undated) DEVICE — CONTAINER,SPECIMEN,3OZ,OR STRL: Brand: MEDLINE

## (undated) DEVICE — SPONGE GZ W4XL4IN COT 12 PLY TYP VII WVN C FLD DSGN STERILE

## (undated) DEVICE — SUTURE VCRL 1 L27IN ABSRB CT BRAID COAT UD J281H

## (undated) DEVICE — CAROTID ARTERY SHUNT KIT,RADIOPAQUE LINE, STRAIGHT: Brand: ARGYLE

## (undated) DEVICE — NEEDLE HYPO 25GA L1.5IN BVL ORIENTED ECLIPSE

## (undated) DEVICE — SUTURE MCRYL SZ 4-0 L27IN ABSRB UD L19MM PS-2 1/2 CIR PRIM Y426H

## (undated) DEVICE — SUTURE VCRL SZ 0 L36IN ABSRB VLT L40MM CT 1/2 CIR J358H

## (undated) DEVICE — SPONGE GZ W4XL4IN COT RADPQ HIGHLY ABSRB

## (undated) DEVICE — SOLUTION IRRIG 3000ML 0.9% SOD CHL USP UROMATIC PLAS CONT

## (undated) DEVICE — SUTURE VCRL SZ 3-0 L27IN ABSRB UD L26MM SH 1/2 CIR J416H

## (undated) DEVICE — KENDALL SCD EXPRESS SLEEVES, KNEE LENGTH, MEDIUM: Brand: KENDALL SCD

## (undated) DEVICE — DERMABOND SKIN ADH 0.7ML -- DERMABOND ADVANCED 12/BX

## (undated) DEVICE — SYSTEM NAVIGATION PALM SZ PRECIS ALIGN TECHNOLOGY DISP FOR

## (undated) DEVICE — ZIMMER® STERILE DISPOSABLE TOURNIQUET CUFF WITH PLC, DUAL PORT, SINGLE BLADDER, 34 IN. (86 CM)

## (undated) DEVICE — COVER,MAYO STAND,STERILE: Brand: MEDLINE

## (undated) DEVICE — ALCOHOL RUBBING ISO 16OZ 70%

## (undated) DEVICE — Z INACTIVE USE 2854267 SPONGE GZ W4XL4IN COT 12 PLY TYP VII WVN C FLD DSGN

## (undated) DEVICE — BIT DRL L5IN DIA2MM STD ST S STL TWST BUSA

## (undated) DEVICE — HEWSON SUTURE RETRIEVER: Brand: HEWSON SUTURE RETRIEVER

## (undated) DEVICE — YANKAUER,FLEXIBLE HANDLE,REGLR CAPACITY: Brand: MEDLINE INDUSTRIES, INC.

## (undated) DEVICE — PATIENT PROTECTIVE PAD FOR IMP UNIVERSAL LATERAL HIP POSITIONER (ULP) (6/CASE): Brand: PATIENT PROTECTIVE PAD

## (undated) DEVICE — DRESSING HYDROCOLLOID BORDER 35X10 IN ALUM PRIMASEAL

## (undated) DEVICE — SPONGE,DRAIN,NONWVN,4"X4",6PLY,STRL,LF: Brand: MEDLINE

## (undated) DEVICE — SUTURE VCRL SZ 2 L54IN ABSRB UD L65MM TP-1 1/2 CIR J880T

## (undated) DEVICE — ELECTRODE PT RET AD L9FT HI MOIST COND ADH HYDRGEL CORDED